# Patient Record
Sex: FEMALE
[De-identification: names, ages, dates, MRNs, and addresses within clinical notes are randomized per-mention and may not be internally consistent; named-entity substitution may affect disease eponyms.]

---
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
## 2019-02-08 ENCOUNTER — RX RENEWAL (OUTPATIENT)
Age: 19
End: 2019-02-08

## 2019-02-14 ENCOUNTER — LABORATORY RESULT (OUTPATIENT)
Age: 19
End: 2019-02-14

## 2019-02-14 ENCOUNTER — APPOINTMENT (OUTPATIENT)
Dept: INTERNAL MEDICINE | Facility: CLINIC | Age: 19
End: 2019-02-14
Payer: COMMERCIAL

## 2019-02-14 VITALS
WEIGHT: 168 LBS | BODY MASS INDEX: 28.68 KG/M2 | HEART RATE: 87 BPM | SYSTOLIC BLOOD PRESSURE: 124 MMHG | TEMPERATURE: 97 F | HEIGHT: 64 IN | OXYGEN SATURATION: 97 % | DIASTOLIC BLOOD PRESSURE: 70 MMHG

## 2019-02-14 DIAGNOSIS — Z82.49 FAMILY HISTORY OF ISCHEMIC HEART DISEASE AND OTHER DISEASES OF THE CIRCULATORY SYSTEM: ICD-10-CM

## 2019-02-14 DIAGNOSIS — M41.9 SCOLIOSIS, UNSPECIFIED: ICD-10-CM

## 2019-02-14 PROCEDURE — 99204 OFFICE O/P NEW MOD 45 MIN: CPT | Mod: 25

## 2019-02-14 PROCEDURE — 36415 COLL VENOUS BLD VENIPUNCTURE: CPT

## 2019-02-14 RX ORDER — OXYCODONE AND ACETAMINOPHEN 5; 325 MG/1; MG/1
5-325 TABLET ORAL
Qty: 28 | Refills: 0 | Status: DISCONTINUED | COMMUNITY
Start: 2017-06-27 | End: 2019-02-14

## 2019-02-14 RX ORDER — NEOMYCIN AND POLYMYXIN B SULFATES AND HYDROCORTISONE OTIC 10; 3.5; 1 MG/ML; MG/ML; [USP'U]/ML
3.5-10000-1 SUSPENSION AURICULAR (OTIC)
Qty: 10 | Refills: 5 | Status: DISCONTINUED | COMMUNITY
Start: 2017-07-07 | End: 2019-02-14

## 2019-02-14 RX ORDER — CEPHALEXIN 500 MG/1
500 CAPSULE ORAL TWICE DAILY
Qty: 14 | Refills: 0 | Status: DISCONTINUED | COMMUNITY
Start: 2017-09-26 | End: 2019-02-14

## 2019-02-14 RX ORDER — METHYLPREDNISOLONE 4 MG/1
4 TABLET ORAL
Qty: 1 | Refills: 0 | Status: DISCONTINUED | COMMUNITY
Start: 2017-09-26 | End: 2019-02-14

## 2019-02-14 RX ORDER — CIPROFLOXACIN AND DEXAMETHASONE 3; 1 MG/ML; MG/ML
0.3-0.1 SUSPENSION/ DROPS AURICULAR (OTIC)
Qty: 1 | Refills: 5 | Status: DISCONTINUED | COMMUNITY
Start: 2017-04-06 | End: 2019-02-14

## 2019-02-14 RX ORDER — NEOMYCIN SULFATE, POLYMYXIN B SULFATE AND HYDROCORTISONE 3.5; 10000; 1 MG/ML; [IU]/ML; MG/ML
3.5-10000-1 SOLUTION AURICULAR (OTIC)
Qty: 10 | Refills: 5 | Status: DISCONTINUED | COMMUNITY
Start: 2017-09-15 | End: 2019-02-14

## 2019-02-14 RX ORDER — CEPHALEXIN 500 MG/1
500 CAPSULE ORAL
Qty: 20 | Refills: 0 | Status: DISCONTINUED | COMMUNITY
Start: 2017-09-15 | End: 2019-02-14

## 2019-02-14 RX ORDER — OFLOXACIN 3 MG/ML
0.3 SOLUTION/ DROPS OPHTHALMIC
Qty: 1 | Refills: 5 | Status: DISCONTINUED | COMMUNITY
Start: 2017-06-02 | End: 2019-02-14

## 2019-02-14 RX ORDER — OFLOXACIN 3 MG/ML
0.3 SOLUTION/ DROPS OPHTHALMIC
Qty: 1 | Refills: 2 | Status: DISCONTINUED | COMMUNITY
Start: 2017-03-13 | End: 2019-02-14

## 2019-02-14 RX ORDER — OFLOXACIN 3 MG/ML
0.3 SOLUTION/ DROPS OPHTHALMIC
Qty: 1 | Refills: 5 | Status: DISCONTINUED | COMMUNITY
Start: 2017-03-15 | End: 2019-02-14

## 2019-02-14 RX ORDER — OFLOXACIN 3 MG/ML
0.3 SOLUTION/ DROPS OPHTHALMIC
Qty: 5 | Refills: 5 | Status: DISCONTINUED | COMMUNITY
Start: 2017-03-17 | End: 2019-02-14

## 2019-02-20 LAB
ALBUMIN SERPL ELPH-MCNC: 4.8 G/DL
ALP BLD-CCNC: 88 U/L
ALT SERPL-CCNC: 6 U/L
ANION GAP SERPL CALC-SCNC: 12 MMOL/L
AST SERPL-CCNC: 16 U/L
BILIRUB SERPL-MCNC: 0.7 MG/DL
BUN SERPL-MCNC: 14 MG/DL
CALCIUM SERPL-MCNC: 9.7 MG/DL
CHLORIDE SERPL-SCNC: 104 MMOL/L
CO2 SERPL-SCNC: 24 MMOL/L
CREAT SERPL-MCNC: 0.68 MG/DL
GLUCOSE SERPL-MCNC: 82 MG/DL
POTASSIUM SERPL-SCNC: 4 MMOL/L
PROT SERPL-MCNC: 7.7 G/DL
SODIUM SERPL-SCNC: 140 MMOL/L
T4 FREE SERPL-MCNC: 1.7 NG/DL
TSH SERPL-ACNC: 1.24 UIU/ML

## 2019-03-14 ENCOUNTER — APPOINTMENT (OUTPATIENT)
Dept: INTERNAL MEDICINE | Facility: CLINIC | Age: 19
End: 2019-03-14
Payer: COMMERCIAL

## 2019-03-14 VITALS
BODY MASS INDEX: 28.68 KG/M2 | HEART RATE: 80 BPM | OXYGEN SATURATION: 98 % | HEIGHT: 64 IN | WEIGHT: 168 LBS | DIASTOLIC BLOOD PRESSURE: 70 MMHG | TEMPERATURE: 97.1 F | SYSTOLIC BLOOD PRESSURE: 122 MMHG

## 2019-03-14 PROCEDURE — 99213 OFFICE O/P EST LOW 20 MIN: CPT

## 2019-05-28 ENCOUNTER — APPOINTMENT (OUTPATIENT)
Dept: INTERNAL MEDICINE | Facility: CLINIC | Age: 19
End: 2019-05-28
Payer: COMMERCIAL

## 2019-05-28 ENCOUNTER — APPOINTMENT (OUTPATIENT)
Dept: OPHTHALMOLOGY | Facility: CLINIC | Age: 19
End: 2019-05-28

## 2019-05-28 VITALS
HEIGHT: 64 IN | TEMPERATURE: 97.5 F | BODY MASS INDEX: 29.02 KG/M2 | WEIGHT: 170 LBS | DIASTOLIC BLOOD PRESSURE: 70 MMHG | SYSTOLIC BLOOD PRESSURE: 110 MMHG

## 2019-05-28 PROCEDURE — 86580 TB INTRADERMAL TEST: CPT

## 2019-05-28 PROCEDURE — 99213 OFFICE O/P EST LOW 20 MIN: CPT | Mod: 25

## 2019-05-28 PROCEDURE — 90471 IMMUNIZATION ADMIN: CPT

## 2019-05-28 PROCEDURE — 90715 TDAP VACCINE 7 YRS/> IM: CPT

## 2019-05-31 ENCOUNTER — APPOINTMENT (OUTPATIENT)
Dept: OTOLARYNGOLOGY | Facility: CLINIC | Age: 19
End: 2019-05-31
Payer: COMMERCIAL

## 2019-05-31 VITALS
DIASTOLIC BLOOD PRESSURE: 78 MMHG | BODY MASS INDEX: 29.02 KG/M2 | HEIGHT: 64 IN | WEIGHT: 170 LBS | SYSTOLIC BLOOD PRESSURE: 112 MMHG | HEART RATE: 81 BPM

## 2019-05-31 PROCEDURE — 31231 NASAL ENDOSCOPY DX: CPT

## 2019-05-31 PROCEDURE — 69210 REMOVE IMPACTED EAR WAX UNI: CPT

## 2019-05-31 PROCEDURE — 99214 OFFICE O/P EST MOD 30 MIN: CPT | Mod: 25

## 2019-05-31 NOTE — HISTORY OF PRESENT ILLNESS
[de-identified] : Adrien has had chills and she thinks fevers for the last week or so along with moderate bilateral ear clogging. She has moderate pain and pressure in both ears as well. She has been using old antibtioics drops that she had with no benefit. She does not have any nasal congestion or runny nose. SHe does have a sore throat that is moderate to severe.

## 2019-05-31 NOTE — ASSESSMENT
[FreeTextEntry1] : A. showed some discomfort in her uterus massive cerumen impaction some mild debris in the left ear consistent with otitis media nasal endoscopy showed deviated septum some secretions was some suctioned out to put her on Zithromax as well as Floxin otic drops fully expecting her symptoms are resolved.

## 2019-07-23 ENCOUNTER — APPOINTMENT (OUTPATIENT)
Dept: OTOLARYNGOLOGY | Facility: CLINIC | Age: 19
End: 2019-07-23
Payer: COMMERCIAL

## 2019-07-23 VITALS
SYSTOLIC BLOOD PRESSURE: 127 MMHG | WEIGHT: 150 LBS | DIASTOLIC BLOOD PRESSURE: 76 MMHG | BODY MASS INDEX: 25.61 KG/M2 | HEIGHT: 64 IN | HEART RATE: 87 BPM

## 2019-07-23 PROCEDURE — 99214 OFFICE O/P EST MOD 30 MIN: CPT

## 2019-07-23 NOTE — HISTORY OF PRESENT ILLNESS
[de-identified] : PAtient is still feeling like the right ear is clogged and she has pain that comes and goes in the right ear. SHe had the surgery in the left ear but is still having issues with the right ear only today. Specifically last niht and today she has had moderate pain in the right ear that is sharp and stabbing. SHe has been using the floxin drops which were last prescribed. SHe deneis sudden changes in hearing, dizziness or ringingin the ears.

## 2019-07-23 NOTE — ASSESSMENT
[FreeTextEntry1] : Patient been complaining of severe discomfort on examination massive cerumen impaction was curetted out showing very boggy external auditory canal consistent otitis externa. Upon on Ciprodex drops give her a coupon to help with the plus and she'll followup at as needed.

## 2019-07-25 ENCOUNTER — LABORATORY RESULT (OUTPATIENT)
Age: 19
End: 2019-07-25

## 2019-07-25 ENCOUNTER — MEDICATION RENEWAL (OUTPATIENT)
Age: 19
End: 2019-07-25

## 2019-07-25 ENCOUNTER — APPOINTMENT (OUTPATIENT)
Dept: INTERNAL MEDICINE | Facility: CLINIC | Age: 19
End: 2019-07-25
Payer: COMMERCIAL

## 2019-07-25 VITALS
BODY MASS INDEX: 31.41 KG/M2 | DIASTOLIC BLOOD PRESSURE: 60 MMHG | HEIGHT: 64 IN | TEMPERATURE: 98.2 F | HEART RATE: 82 BPM | SYSTOLIC BLOOD PRESSURE: 124 MMHG | WEIGHT: 184 LBS | OXYGEN SATURATION: 97 %

## 2019-07-25 PROCEDURE — 36415 COLL VENOUS BLD VENIPUNCTURE: CPT

## 2019-07-25 PROCEDURE — 99214 OFFICE O/P EST MOD 30 MIN: CPT | Mod: 25

## 2019-07-29 LAB
ALBUMIN SERPL ELPH-MCNC: 4.7 G/DL
ALP BLD-CCNC: 101 U/L
ALT SERPL-CCNC: 58 U/L
ANION GAP SERPL CALC-SCNC: 12 MMOL/L
AST SERPL-CCNC: 36 U/L
BILIRUB SERPL-MCNC: 0.3 MG/DL
BUN SERPL-MCNC: 15 MG/DL
CALCIUM SERPL-MCNC: 10.1 MG/DL
CHLORIDE SERPL-SCNC: 102 MMOL/L
CO2 SERPL-SCNC: 24 MMOL/L
CREAT SERPL-MCNC: 0.51 MG/DL
GLUCOSE SERPL-MCNC: 112 MG/DL
POTASSIUM SERPL-SCNC: 4.3 MMOL/L
PROT SERPL-MCNC: 7.4 G/DL
SODIUM SERPL-SCNC: 138 MMOL/L
T3 SERPL-MCNC: 137 NG/DL
T3RU NFR SERPL: 1.1 TBI
T4 FREE SERPL-MCNC: 1.4 NG/DL
T4 SERPL-MCNC: 8.8 UG/DL
TSH SERPL-ACNC: 1.1 UIU/ML

## 2019-09-23 ENCOUNTER — APPOINTMENT (OUTPATIENT)
Dept: INTERNAL MEDICINE | Facility: CLINIC | Age: 19
End: 2019-09-23
Payer: COMMERCIAL

## 2019-09-23 VITALS
HEART RATE: 102 BPM | WEIGHT: 185 LBS | BODY MASS INDEX: 31.58 KG/M2 | SYSTOLIC BLOOD PRESSURE: 122 MMHG | OXYGEN SATURATION: 98 % | DIASTOLIC BLOOD PRESSURE: 60 MMHG | TEMPERATURE: 98.2 F | HEIGHT: 64 IN

## 2019-09-23 PROCEDURE — 99213 OFFICE O/P EST LOW 20 MIN: CPT

## 2019-10-04 ENCOUNTER — APPOINTMENT (OUTPATIENT)
Dept: OTOLARYNGOLOGY | Facility: CLINIC | Age: 19
End: 2019-10-04
Payer: COMMERCIAL

## 2019-10-04 VITALS
BODY MASS INDEX: 31.58 KG/M2 | WEIGHT: 185 LBS | HEART RATE: 80 BPM | HEIGHT: 64 IN | SYSTOLIC BLOOD PRESSURE: 118 MMHG | DIASTOLIC BLOOD PRESSURE: 79 MMHG

## 2019-10-04 PROCEDURE — 99214 OFFICE O/P EST MOD 30 MIN: CPT | Mod: 25

## 2019-10-04 PROCEDURE — 69210 REMOVE IMPACTED EAR WAX UNI: CPT

## 2019-10-04 NOTE — REASON FOR VISIT
[Subsequent Evaluation] : a subsequent evaluation for [Ear Drainage] : ear drainage [Ear Pain] : ear pain [FreeTextEntry2] : hearing issues

## 2019-10-04 NOTE — PHYSICAL EXAM
[de-identified] : wax bilat and tender [de-identified] : TM's not fully visualized bilat [Midline] : trachea located in midline position [Normal] : no rashes

## 2019-10-04 NOTE — ASSESSMENT
[FreeTextEntry1] : Bilat wax impaction\par Rec otic drops placed and prescribed\par f/u 10-14 days \par h2o precautions\par I suggested a 2nd opinion regarding long term care of ears

## 2019-10-04 NOTE — REVIEW OF SYSTEMS
[Ear Pain] : ear pain [Hearing Loss] : hearing loss [Nasal Congestion] : nasal congestion [Negative] : Heme/Lymph

## 2019-10-04 NOTE — HISTORY OF PRESENT ILLNESS
[No] : patient does not have a  history of radiation therapy [T plasty] : tympanoplasty [de-identified] : 19 yo\par Patient is having minor pressure in the ears and has muffled hearing that comes and goes. SHe has some issues with her left ear as she had surgery over the last year and  in the right ear . She has chr intermittent ear pain and drainage bilat from ears. SHe does not have any nasal congestion or runny nose right now. She tried putting ear drops in the ears but it didn’t help with the clogged sensation. 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 \par h/o cleft lip and palate repair [Tinnitus] : no tinnitus [Hearing Loss] : hearing loss [Otalgia] : otalgia [Otorrhea] : no otorrhea [Meniere Disease] : no Meniere disease [Vertigo] : no vertigo [Otosclerosis] : no otosclerosis [Eustachian Tube Dysfunction] : eustachian tube dysfunction [Perilymphatic Fistula] : no perilymphatic fistula [Hypertension] : no hypertension [Loud Noise Exposure] : no history of loud noise exposure [Smoking] : no smoking [Facial Pain] : no facial pain [Facial Pressure] : no facial pressure [Nasal Congestion] : no nasal congestion [Ear Pain] : ear pain [Allergic Rhinitis] : no allergic rhinitis [Ear Fullness] : ear fullness [Seasonal Allergies] : no seasonal allergies [Environmental Allergies] : no environmental allergies [Environmental Allergens] : no environmental allergens [Allergies] : no allergies [Asthma] : no asthma [None] : The patient is currently asymptomatic. [Neck Mass] : no neck mass [Neck Pain] : no neck pain [Cold Intolerance] : no cold intolerance [Chills] : no chills [Cough] : no cough [Fatigue] : no fatigue [Heat Intolerance] : no heat intolerance [Sialadenitis] : no sialadenitis [Hyperthyroidism] : no hyperthyroidism [Hodgkin Disease] : no hodgkin disease [Non-Hodgkin Lymphoma] : no non-hodgkin lymphoma [Tobacco Use] : no tobacco use [Alcohol Use] : no alcohol use [Graves Disease] : no graves disease [Thyroid Cancer] : no thyroid cancer

## 2019-10-25 ENCOUNTER — APPOINTMENT (OUTPATIENT)
Dept: OTOLARYNGOLOGY | Facility: CLINIC | Age: 19
End: 2019-10-25

## 2019-11-04 ENCOUNTER — APPOINTMENT (OUTPATIENT)
Dept: OTOLARYNGOLOGY | Facility: CLINIC | Age: 19
End: 2019-11-04
Payer: COMMERCIAL

## 2019-11-04 VITALS
DIASTOLIC BLOOD PRESSURE: 80 MMHG | HEART RATE: 88 BPM | HEIGHT: 64 IN | SYSTOLIC BLOOD PRESSURE: 125 MMHG | WEIGHT: 185 LBS | BODY MASS INDEX: 31.58 KG/M2

## 2019-11-04 PROCEDURE — 69210 REMOVE IMPACTED EAR WAX UNI: CPT

## 2019-11-04 PROCEDURE — 99214 OFFICE O/P EST MOD 30 MIN: CPT | Mod: 25

## 2019-11-04 NOTE — HISTORY OF PRESENT ILLNESS
[T plasty] : tympanoplasty [No] : patient does not have a  history of radiation therapy [de-identified] : Emergency visit\par 17 yo\par Patient is having minor pressure in the ears and has muffled hearing that comes and goes. SHe has some issues with her left ear as she had surgery over the last year and  in the right ear . She has chr intermittent ear pain and drainage bilat from ears. SHe does not have any nasal congestion or runny nose right now. She tried putting ear drops in the ears but it didn’t help with the clogged sensation. 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 \par h/o cleft lip and palate repair [Tinnitus] : no tinnitus [Hearing Loss] : hearing loss [Otalgia] : otalgia [Otorrhea] : no otorrhea [Vertigo] : no vertigo [Meniere Disease] : no Meniere disease [Eustachian Tube Dysfunction] : eustachian tube dysfunction [Otosclerosis] : no otosclerosis [Perilymphatic Fistula] : no perilymphatic fistula [Hypertension] : no hypertension [Loud Noise Exposure] : no history of loud noise exposure [Smoking] : no smoking [Facial Pain] : no facial pain [Facial Pressure] : no facial pressure [Nasal Congestion] : no nasal congestion [Ear Pain] : ear pain [Ear Fullness] : ear fullness [Allergic Rhinitis] : no allergic rhinitis [Environmental Allergies] : no environmental allergies [Seasonal Allergies] : no seasonal allergies [Environmental Allergens] : no environmental allergens [Allergies] : no allergies [Asthma] : no asthma [None] : The patient is currently asymptomatic. [Neck Mass] : no neck mass [Neck Pain] : no neck pain [Chills] : no chills [Cold Intolerance] : no cold intolerance [Cough] : no cough [Fatigue] : no fatigue [Heat Intolerance] : no heat intolerance [Hyperthyroidism] : no hyperthyroidism [Sialadenitis] : no sialadenitis [Hodgkin Disease] : no hodgkin disease [Non-Hodgkin Lymphoma] : no non-hodgkin lymphoma [Tobacco Use] : no tobacco use [Alcohol Use] : no alcohol use [Graves Disease] : no graves disease [Thyroid Cancer] : no thyroid cancer

## 2019-11-04 NOTE — PHYSICAL EXAM
[de-identified] : wax bilat and tender [de-identified] : TM's not fully visualized bilatRt TM seems intact  Lt-prob perf [Midline] : trachea located in midline position [Normal] : no rashes

## 2019-11-04 NOTE — ASSESSMENT
[FreeTextEntry1] : Bilat wax impaction\par Chr mild OE and poss Bilat TM perf\par Rec otic drops placed and prescribed\par Pt tasted the drops when placed opn the left but dod not when placed on the right\par f/u 10-14 days \par h2o precautions\par I suggested a 2nd opinion regarding long term care of ears\par poss rev ear surgery

## 2019-11-04 NOTE — REASON FOR VISIT
[Subsequent Evaluation] : a subsequent evaluation for [Ear Drainage] : ear drainage [Ear Pain] : ear pain [FreeTextEntry2] : hearing issues  [Parent] : parent

## 2019-11-14 ENCOUNTER — APPOINTMENT (OUTPATIENT)
Dept: OTOLARYNGOLOGY | Facility: CLINIC | Age: 19
End: 2019-11-14

## 2019-12-06 ENCOUNTER — APPOINTMENT (OUTPATIENT)
Dept: OTOLARYNGOLOGY | Facility: CLINIC | Age: 19
End: 2019-12-06

## 2019-12-09 ENCOUNTER — APPOINTMENT (OUTPATIENT)
Dept: OTOLARYNGOLOGY | Facility: CLINIC | Age: 19
End: 2019-12-09

## 2019-12-30 ENCOUNTER — APPOINTMENT (OUTPATIENT)
Dept: INTERNAL MEDICINE | Facility: CLINIC | Age: 19
End: 2019-12-30
Payer: COMMERCIAL

## 2019-12-30 VITALS — SYSTOLIC BLOOD PRESSURE: 122 MMHG | DIASTOLIC BLOOD PRESSURE: 80 MMHG | WEIGHT: 186 LBS | TEMPERATURE: 99.3 F

## 2019-12-30 DIAGNOSIS — Z87.2 PERSONAL HISTORY OF DISEASES OF THE SKIN AND SUBCUTANEOUS TISSUE: ICD-10-CM

## 2019-12-30 PROCEDURE — 99213 OFFICE O/P EST LOW 20 MIN: CPT

## 2020-01-02 ENCOUNTER — APPOINTMENT (OUTPATIENT)
Dept: SURGERY | Facility: CLINIC | Age: 20
End: 2020-01-02

## 2020-01-02 ENCOUNTER — EMERGENCY (EMERGENCY)
Facility: HOSPITAL | Age: 20
LOS: 1 days | Discharge: LEFT BEFORE DISCHARGE | End: 2020-01-02
Admitting: EMERGENCY MEDICINE

## 2020-01-02 DIAGNOSIS — M26.02 MAXILLARY HYPOPLASIA: Chronic | ICD-10-CM

## 2020-01-02 RX ORDER — OFLOXACIN OTIC 3 MG/ML
0.3 SOLUTION AURICULAR (OTIC) TWICE DAILY
Qty: 1 | Refills: 5 | Status: DISCONTINUED | COMMUNITY
Start: 2019-05-31 | End: 2020-01-02

## 2020-01-02 RX ORDER — AZITHROMYCIN 250 MG/1
250 TABLET, FILM COATED ORAL
Qty: 1 | Refills: 0 | Status: DISCONTINUED | COMMUNITY
Start: 2019-05-31 | End: 2020-01-02

## 2020-01-02 RX ORDER — NEOMYCIN AND POLYMYXIN B SULFATES AND HYDROCORTISONE OTIC 10; 3.5; 1 MG/ML; MG/ML; [USP'U]/ML
3.5-10000-1 SUSPENSION AURICULAR (OTIC)
Qty: 1 | Refills: 0 | Status: DISCONTINUED | COMMUNITY
Start: 2018-10-04 | End: 2020-01-02

## 2020-01-02 RX ORDER — OFLOXACIN OTIC 3 MG/ML
0.3 SOLUTION AURICULAR (OTIC) TWICE DAILY
Qty: 1 | Refills: 2 | Status: DISCONTINUED | COMMUNITY
Start: 2018-06-28 | End: 2020-01-02

## 2020-01-02 RX ORDER — CIPROFLOXACIN AND DEXAMETHASONE 3; 1 MG/ML; MG/ML
0.3-0.1 SUSPENSION/ DROPS AURICULAR (OTIC)
Qty: 1 | Refills: 3 | Status: DISCONTINUED | COMMUNITY
Start: 2019-07-23 | End: 2020-01-02

## 2020-01-13 ENCOUNTER — APPOINTMENT (OUTPATIENT)
Dept: INTERNAL MEDICINE | Facility: CLINIC | Age: 20
End: 2020-01-13
Payer: COMMERCIAL

## 2020-01-13 VITALS
BODY MASS INDEX: 31.76 KG/M2 | TEMPERATURE: 97 F | WEIGHT: 186 LBS | SYSTOLIC BLOOD PRESSURE: 128 MMHG | OXYGEN SATURATION: 99 % | HEIGHT: 64 IN | HEART RATE: 86 BPM | DIASTOLIC BLOOD PRESSURE: 80 MMHG

## 2020-01-13 DIAGNOSIS — Z11.4 ENCOUNTER FOR SCREENING FOR HUMAN IMMUNODEFICIENCY VIRUS [HIV]: ICD-10-CM

## 2020-01-13 PROCEDURE — 99395 PREV VISIT EST AGE 18-39: CPT | Mod: 25

## 2020-01-13 PROCEDURE — 93000 ELECTROCARDIOGRAM COMPLETE: CPT

## 2020-01-13 PROCEDURE — 36415 COLL VENOUS BLD VENIPUNCTURE: CPT

## 2020-01-13 RX ORDER — CEPHALEXIN 500 MG/1
500 CAPSULE ORAL
Qty: 30 | Refills: 0 | Status: DISCONTINUED | COMMUNITY
Start: 2019-12-30 | End: 2020-01-13

## 2020-01-13 RX ORDER — ESCITALOPRAM OXALATE 20 MG/1
20 TABLET ORAL
Qty: 90 | Refills: 0 | Status: DISCONTINUED | COMMUNITY
Start: 2019-02-14 | End: 2020-01-13

## 2020-01-15 LAB
ALBUMIN SERPL ELPH-MCNC: 4.6 G/DL
ALP BLD-CCNC: 97 U/L
ALT SERPL-CCNC: 40 U/L
ANION GAP SERPL CALC-SCNC: 14 MMOL/L
APPEARANCE: ABNORMAL
AST SERPL-CCNC: 24 U/L
BACTERIA: ABNORMAL
BASOPHILS # BLD AUTO: 0.03 K/UL
BASOPHILS NFR BLD AUTO: 0.5 %
BILIRUB SERPL-MCNC: 0.2 MG/DL
BILIRUBIN URINE: NEGATIVE
BLOOD URINE: NEGATIVE
BUN SERPL-MCNC: 15 MG/DL
CALCIUM SERPL-MCNC: 10 MG/DL
CHLORIDE SERPL-SCNC: 105 MMOL/L
CHOLEST SERPL-MCNC: 140 MG/DL
CHOLEST/HDLC SERPL: 3.3 RATIO
CO2 SERPL-SCNC: 22 MMOL/L
COLOR: YELLOW
CREAT SERPL-MCNC: 0.56 MG/DL
EOSINOPHIL # BLD AUTO: 0.17 K/UL
EOSINOPHIL NFR BLD AUTO: 2.7 %
GLUCOSE QUALITATIVE U: NEGATIVE
GLUCOSE SERPL-MCNC: 90 MG/DL
HCT VFR BLD CALC: 42 %
HDLC SERPL-MCNC: 43 MG/DL
HGB BLD-MCNC: 13.1 G/DL
HIV1+2 AB SPEC QL IA.RAPID: NONREACTIVE
HYALINE CASTS: 0 /LPF
IMM GRANULOCYTES NFR BLD AUTO: 0.5 %
KETONES URINE: NEGATIVE
LDLC SERPL CALC-MCNC: 82 MG/DL
LEUKOCYTE ESTERASE URINE: ABNORMAL
LYMPHOCYTES # BLD AUTO: 1.6 K/UL
LYMPHOCYTES NFR BLD AUTO: 25.8 %
MAN DIFF?: NORMAL
MCHC RBC-ENTMCNC: 27.4 PG
MCHC RBC-ENTMCNC: 31.2 GM/DL
MCV RBC AUTO: 87.9 FL
MICROSCOPIC-UA: NORMAL
MONOCYTES # BLD AUTO: 0.38 K/UL
MONOCYTES NFR BLD AUTO: 6.1 %
NEUTROPHILS # BLD AUTO: 4 K/UL
NEUTROPHILS NFR BLD AUTO: 64.4 %
NITRITE URINE: NEGATIVE
PH URINE: 5.5
PLATELET # BLD AUTO: 288 K/UL
POTASSIUM SERPL-SCNC: 4.5 MMOL/L
PROT SERPL-MCNC: 7.3 G/DL
PROTEIN URINE: NEGATIVE
RBC # BLD: 4.78 M/UL
RBC # FLD: 12.8 %
RED BLOOD CELLS URINE: 4 /HPF
SODIUM SERPL-SCNC: 141 MMOL/L
SPECIFIC GRAVITY URINE: 1.03
SQUAMOUS EPITHELIAL CELLS: 10 /HPF
T4 FREE SERPL-MCNC: 1.6 NG/DL
TRIGL SERPL-MCNC: 74 MG/DL
TSH SERPL-ACNC: 2.62 UIU/ML
UROBILINOGEN URINE: NORMAL
WBC # FLD AUTO: 6.21 K/UL
WHITE BLOOD CELLS URINE: 9 /HPF

## 2020-02-07 ENCOUNTER — APPOINTMENT (OUTPATIENT)
Dept: OTOLARYNGOLOGY | Facility: CLINIC | Age: 20
End: 2020-02-07
Payer: COMMERCIAL

## 2020-02-07 VITALS
BODY MASS INDEX: 31.76 KG/M2 | WEIGHT: 186 LBS | HEART RATE: 75 BPM | DIASTOLIC BLOOD PRESSURE: 76 MMHG | SYSTOLIC BLOOD PRESSURE: 124 MMHG | HEIGHT: 64 IN

## 2020-02-07 PROCEDURE — 99214 OFFICE O/P EST MOD 30 MIN: CPT | Mod: 25

## 2020-02-07 PROCEDURE — 69210 REMOVE IMPACTED EAR WAX UNI: CPT

## 2020-02-07 NOTE — HISTORY OF PRESENT ILLNESS
[de-identified] : 19F s/p cleft lip/palate repair in early childhood, had tubes as a young child around age 1, left eardrum repair age 6, prior repair Dr. Hummel 2017.  Now with left hearing loss and associated pain.  Hearing loss is constant, mild severity, present for years.  Sometimes gets drainage in both ears.

## 2020-02-07 NOTE — REASON FOR VISIT
[Initial Evaluation] : an initial evaluation for [FreeTextEntry2] : patient looking for a second opinion in her left ear, patient states born with a hole in her left ear and had surgery in 2017 in left ear to repair hole, patient states been having recurrent ear in fection after the surgery and last time she took antibiotics for left ear infection was 3 weeks ago

## 2020-02-07 NOTE — REVIEW OF SYSTEMS
[Ear Pain] : ear pain [Recurrent Ear Infections] : recurrent ear infections [Ear Drainage] : ear drainage [Ear Noises] : ear noises [Negative] : Psychiatric

## 2020-02-07 NOTE — PHYSICAL EXAM
[de-identified] : L myringitis with TM thickening, no visible perforation; R TM intact without effusion/retraction [de-identified] : ceruminous debris left ear canal [FreeTextEntry2] : Facial nerve function is House Brackmann 1/6 in right ear and 1/6 in left ear.  s/p prior cleft lip/palate repair [Normal] : no rashes

## 2020-02-07 NOTE — DATA REVIEWED
[de-identified] : I personally reviewed the patient's audiogram from 2018, which shows L CHL, normal hearing right ear.\par

## 2020-02-21 ENCOUNTER — APPOINTMENT (OUTPATIENT)
Dept: OTOLARYNGOLOGY | Facility: CLINIC | Age: 20
End: 2020-02-21
Payer: COMMERCIAL

## 2020-02-21 PROCEDURE — 92567 TYMPANOMETRY: CPT

## 2020-02-21 PROCEDURE — G0268 REMOVAL OF IMPACTED WAX MD: CPT

## 2020-02-21 PROCEDURE — 99213 OFFICE O/P EST LOW 20 MIN: CPT | Mod: 25

## 2020-02-21 PROCEDURE — 92557 COMPREHENSIVE HEARING TEST: CPT

## 2020-02-21 RX ORDER — FLUTICASONE PROPIONATE 50 UG/1
50 SPRAY, METERED NASAL DAILY
Qty: 1 | Refills: 10 | Status: DISCONTINUED | COMMUNITY
Start: 2017-06-02 | End: 2020-02-21

## 2020-02-21 RX ORDER — OFLOXACIN OTIC 3 MG/ML
0.3 SOLUTION AURICULAR (OTIC) TWICE DAILY
Qty: 1 | Refills: 3 | Status: DISCONTINUED | COMMUNITY
Start: 2019-10-04 | End: 2020-02-21

## 2020-02-21 NOTE — REASON FOR VISIT
[Subsequent Evaluation] : a subsequent evaluation for [Parent] : parent [FreeTextEntry2] : left hearing loss

## 2020-02-21 NOTE — PHYSICAL EXAM
[de-identified] : ceruminous debris left ear canal [de-identified] : L myringitis improved since last visit, minor residual powder removed; R TM intact without effusion/retraction [FreeTextEntry2] : Facial nerve function is House Brackmann 1/6 in right ear and 1/6 in left ear.  s/p prior cleft lip/palate repair [Normal] : orientation to person, place, and time: normal

## 2020-02-21 NOTE — DATA REVIEWED
[de-identified] : I personally reviewed the patient's audiogram, which shows mild LF L CHL rising to normal, improved in high frequencies compared to last audio from Dr. Hummel's office.  \par

## 2020-02-21 NOTE — HISTORY OF PRESENT ILLNESS
[de-identified] : 19 year old female s/p cleft lip/palate repair and left eardrum repair. , follow up for left hearing loss. Reports hearing loss is the same since the last visit. Reports occasional left tinnitus.  Patient denies otalgia, otorrhea, dizziness, vertigo, headaches related to hearing.  Left ear pain/itching improved.\par \par

## 2020-03-20 ENCOUNTER — APPOINTMENT (OUTPATIENT)
Dept: OTOLARYNGOLOGY | Facility: CLINIC | Age: 20
End: 2020-03-20
Payer: COMMERCIAL

## 2020-03-20 PROCEDURE — 99441: CPT

## 2020-04-24 ENCOUNTER — APPOINTMENT (OUTPATIENT)
Dept: OTOLARYNGOLOGY | Facility: CLINIC | Age: 20
End: 2020-04-24
Payer: COMMERCIAL

## 2020-04-24 VITALS
HEART RATE: 90 BPM | SYSTOLIC BLOOD PRESSURE: 134 MMHG | BODY MASS INDEX: 29.88 KG/M2 | HEIGHT: 64 IN | WEIGHT: 175 LBS | DIASTOLIC BLOOD PRESSURE: 82 MMHG

## 2020-04-24 VITALS — TEMPERATURE: 98.7 F

## 2020-04-24 PROCEDURE — 69210 REMOVE IMPACTED EAR WAX UNI: CPT

## 2020-04-24 PROCEDURE — 99213 OFFICE O/P EST LOW 20 MIN: CPT | Mod: 25

## 2020-04-24 NOTE — PROCEDURE
[FreeTextEntry3] : Procedure note:  Bilateral cerumenectomy\par \par Apr 24, 2020 \par \par Description of Procedure:   Bilateral cerumen impactions were noted requiring debridement under the operating microscope using otologic instrumentation.  The patient tolerated the procedure without complications.\par \par

## 2020-04-24 NOTE — REASON FOR VISIT
[Subsequent Evaluation] : a subsequent evaluation for [FreeTextEntry2] : c/o of clogged in right ear R>L but started with left ear a month ago with pain and itchiness c/o of right ear drainage, ear ringing started from a long time

## 2020-04-24 NOTE — PHYSICAL EXAM
[de-identified] : no evidence of myringitis in either ear, both TM appear intact [de-identified] : minor ceruminous debris b/l lateral ear canal [Normal] : no rashes

## 2020-05-22 ENCOUNTER — APPOINTMENT (OUTPATIENT)
Dept: OTOLARYNGOLOGY | Facility: CLINIC | Age: 20
End: 2020-05-22

## 2020-06-10 ENCOUNTER — APPOINTMENT (OUTPATIENT)
Dept: INTERNAL MEDICINE | Facility: CLINIC | Age: 20
End: 2020-06-10
Payer: COMMERCIAL

## 2020-06-10 PROCEDURE — 36415 COLL VENOUS BLD VENIPUNCTURE: CPT

## 2020-06-11 LAB
SARS-COV-2 IGG SERPL IA-ACNC: <0.1 INDEX
SARS-COV-2 IGG SERPL QL IA: NEGATIVE

## 2020-06-29 ENCOUNTER — APPOINTMENT (OUTPATIENT)
Dept: INTERNAL MEDICINE | Facility: CLINIC | Age: 20
End: 2020-06-29
Payer: COMMERCIAL

## 2020-06-29 VITALS
HEIGHT: 65 IN | BODY MASS INDEX: 29.82 KG/M2 | SYSTOLIC BLOOD PRESSURE: 122 MMHG | DIASTOLIC BLOOD PRESSURE: 80 MMHG | WEIGHT: 179 LBS | TEMPERATURE: 98 F | HEART RATE: 100 BPM

## 2020-06-29 VITALS — TEMPERATURE: 98.4 F

## 2020-06-29 DIAGNOSIS — B35.3 TINEA PEDIS: ICD-10-CM

## 2020-06-29 PROCEDURE — 99214 OFFICE O/P EST MOD 30 MIN: CPT

## 2020-06-29 RX ORDER — DOXYCYCLINE 100 MG/1
100 CAPSULE ORAL
Qty: 30 | Refills: 0 | Status: DISCONTINUED | COMMUNITY
Start: 2020-01-02 | End: 2020-06-29

## 2020-07-16 ENCOUNTER — NON-APPOINTMENT (OUTPATIENT)
Age: 20
End: 2020-07-16

## 2020-07-16 ENCOUNTER — APPOINTMENT (OUTPATIENT)
Dept: OPHTHALMOLOGY | Facility: CLINIC | Age: 20
End: 2020-07-16
Payer: COMMERCIAL

## 2020-07-16 PROCEDURE — 92014 COMPRE OPH EXAM EST PT 1/>: CPT

## 2020-08-20 ENCOUNTER — APPOINTMENT (OUTPATIENT)
Dept: INTERNAL MEDICINE | Facility: CLINIC | Age: 20
End: 2020-08-20
Payer: COMMERCIAL

## 2020-08-20 VITALS
WEIGHT: 174 LBS | OXYGEN SATURATION: 98 % | HEIGHT: 65 IN | DIASTOLIC BLOOD PRESSURE: 80 MMHG | BODY MASS INDEX: 28.99 KG/M2 | SYSTOLIC BLOOD PRESSURE: 122 MMHG | HEART RATE: 83 BPM

## 2020-08-20 VITALS — TEMPERATURE: 98.6 F

## 2020-08-20 PROCEDURE — 99214 OFFICE O/P EST MOD 30 MIN: CPT | Mod: 25

## 2020-08-20 PROCEDURE — 86580 TB INTRADERMAL TEST: CPT

## 2020-09-04 ENCOUNTER — APPOINTMENT (OUTPATIENT)
Dept: OTOLARYNGOLOGY | Facility: CLINIC | Age: 20
End: 2020-09-04
Payer: COMMERCIAL

## 2020-09-04 VITALS
DIASTOLIC BLOOD PRESSURE: 87 MMHG | SYSTOLIC BLOOD PRESSURE: 119 MMHG | HEART RATE: 87 BPM | WEIGHT: 170 LBS | BODY MASS INDEX: 28.32 KG/M2 | HEIGHT: 65 IN

## 2020-09-04 PROCEDURE — 99213 OFFICE O/P EST LOW 20 MIN: CPT | Mod: 25

## 2020-09-04 PROCEDURE — 69210 REMOVE IMPACTED EAR WAX UNI: CPT

## 2020-09-04 RX ORDER — SERTRALINE 25 MG/1
25 TABLET, FILM COATED ORAL
Qty: 90 | Refills: 3 | Status: COMPLETED | COMMUNITY
Start: 2020-01-13 | End: 2020-09-04

## 2020-09-04 RX ORDER — CLOTRIMAZOLE 10 MG/G
1 CREAM TOPICAL 3 TIMES DAILY
Qty: 1 | Refills: 0 | Status: COMPLETED | COMMUNITY
Start: 2020-06-29 | End: 2020-09-04

## 2020-09-04 NOTE — HISTORY OF PRESENT ILLNESS
[de-identified] : 19 year female follow up bilateral clogged ears. Reports feeling better after Wilfred powder applied to both ear canals 04/24/2020. States starting to feel both ears clogged for 1 week. Reports dizziness 2 days ago, and intermittent left tinnitus. Denies otalgia, otorrhea, headaches related to hearing.

## 2020-09-04 NOTE — PHYSICAL EXAM
[de-identified] : minor ceruminous debris b/l lateral ear canal [de-identified] : no evidence of myringitis in either ear, both TM appear intact [Normal] : no rashes

## 2020-09-04 NOTE — PROCEDURE
[FreeTextEntry3] : Procedure note:  Bilateral cerumenectomy\par \par Sep 04, 2020 \par \par Description of Procedure:   Bilateral cerumen impactions were noted requiring debridement under the operating microscope using otologic instrumentation.  The patient tolerated the procedure without complications.\par \par

## 2020-10-13 ENCOUNTER — APPOINTMENT (OUTPATIENT)
Dept: INTERNAL MEDICINE | Facility: CLINIC | Age: 20
End: 2020-10-13
Payer: COMMERCIAL

## 2020-10-13 VITALS
DIASTOLIC BLOOD PRESSURE: 70 MMHG | SYSTOLIC BLOOD PRESSURE: 120 MMHG | OXYGEN SATURATION: 99 % | BODY MASS INDEX: 29.32 KG/M2 | HEART RATE: 98 BPM | HEIGHT: 65 IN | WEIGHT: 176 LBS | TEMPERATURE: 97 F

## 2020-10-13 PROCEDURE — 99214 OFFICE O/P EST MOD 30 MIN: CPT | Mod: 25

## 2020-10-13 PROCEDURE — 90686 IIV4 VACC NO PRSV 0.5 ML IM: CPT

## 2020-10-13 PROCEDURE — G0008: CPT

## 2020-10-13 PROCEDURE — 36415 COLL VENOUS BLD VENIPUNCTURE: CPT

## 2020-10-19 LAB
ANA SER IF-ACNC: NEGATIVE
FERRITIN SERPL-MCNC: 32 NG/ML
FOLATE SERPL-MCNC: 9.3 NG/ML
T4 FREE SERPL-MCNC: 1.4 NG/DL
TSH SERPL-ACNC: 1.28 UIU/ML
VIT B12 SERPL-MCNC: 465 PG/ML

## 2020-12-10 ENCOUNTER — APPOINTMENT (OUTPATIENT)
Dept: OTOLARYNGOLOGY | Facility: CLINIC | Age: 20
End: 2020-12-10
Payer: COMMERCIAL

## 2020-12-10 VITALS
HEART RATE: 75 BPM | BODY MASS INDEX: 29.32 KG/M2 | DIASTOLIC BLOOD PRESSURE: 70 MMHG | HEIGHT: 65 IN | SYSTOLIC BLOOD PRESSURE: 120 MMHG | WEIGHT: 176 LBS

## 2020-12-10 PROCEDURE — 99072 ADDL SUPL MATRL&STAF TM PHE: CPT

## 2020-12-10 PROCEDURE — 99213 OFFICE O/P EST LOW 20 MIN: CPT | Mod: 25

## 2020-12-10 PROCEDURE — 69210 REMOVE IMPACTED EAR WAX UNI: CPT

## 2020-12-10 NOTE — PHYSICAL EXAM
[de-identified] : minor ceruminous debris b/l lateral ear canal [de-identified] : no evidence of myringitis in either ear, both TM appear intact [Normal] : no rashes

## 2020-12-10 NOTE — REASON FOR VISIT
[Subsequent Evaluation] : a subsequent evaluation for [FreeTextEntry2] : 3 months f/u for left ear, Patient states has not applied the powder

## 2020-12-16 PROBLEM — H66.90 EAR INFECTION: Status: RESOLVED | Noted: 2017-09-15 | Resolved: 2020-12-16

## 2021-01-08 ENCOUNTER — APPOINTMENT (OUTPATIENT)
Dept: OTOLARYNGOLOGY | Facility: CLINIC | Age: 21
End: 2021-01-08

## 2021-01-19 ENCOUNTER — APPOINTMENT (OUTPATIENT)
Dept: OTOLARYNGOLOGY | Facility: CLINIC | Age: 21
End: 2021-01-19
Payer: COMMERCIAL

## 2021-01-19 VITALS
BODY MASS INDEX: 29.16 KG/M2 | HEART RATE: 69 BPM | WEIGHT: 175 LBS | HEIGHT: 65 IN | DIASTOLIC BLOOD PRESSURE: 87 MMHG | SYSTOLIC BLOOD PRESSURE: 126 MMHG

## 2021-01-19 DIAGNOSIS — K21.9 GASTRO-ESOPHAGEAL REFLUX DISEASE W/OUT ESOPHAGITIS: ICD-10-CM

## 2021-01-19 PROCEDURE — 31575 DIAGNOSTIC LARYNGOSCOPY: CPT

## 2021-01-19 PROCEDURE — 69210 REMOVE IMPACTED EAR WAX UNI: CPT

## 2021-01-19 PROCEDURE — 99072 ADDL SUPL MATRL&STAF TM PHE: CPT

## 2021-01-19 PROCEDURE — 99213 OFFICE O/P EST LOW 20 MIN: CPT | Mod: 25

## 2021-01-19 NOTE — REASON FOR VISIT
[Initial Consultation] : an initial consultation for [FreeTextEntry2] : referred by Dr. Lopez Angel for dysphagia.

## 2021-01-19 NOTE — PHYSICAL EXAM
[Midline] : trachea located in midline position [Laryngoscopy Performed] : laryngoscopy was performed, see procedure section for findings [Normal] : no rashes [de-identified] : s/p Cleft lip and palate repair.  Persistent bifid uvula present.

## 2021-01-19 NOTE — CONSULT LETTER
[Dear  ___] : Dear  [unfilled], [Consult Letter:] : I had the pleasure of evaluating your patient, [unfilled]. [Please see my note below.] : Please see my note below. [Consult Closing:] : Thank you very much for allowing me to participate in the care of this patient.  If you have any questions, please do not hesitate to contact me. [Sincerely,] : Sincerely, [DrShayy  ___] : Dr. WALKER [FreeTextEntry2] : Dr. Lopez Angel ([facility] physician)\par  [FreeTextEntry3] : Himanshu Heller MD, FACS\par Chief of Otolaryngology at [facility]\par \par Dept. of Otolaryngology\par [facility]\par   [___] : [unfilled]

## 2021-01-19 NOTE — PROCEDURE
[Unable to Cooperate with Mirror] : patient unable to cooperate with mirror [Dysphagia] : dysphagia not clearly evaluated by indirect laryngoscopy [Globus] : globus [Topical Lidocaine] : topical lidocaine [Oxymetazoline HCl] : oxymetazoline HCl [Flexible Endoscope] : examined with the flexible endoscope [Serial Number: ___] : Serial Number: [unfilled] [] : inflamed bilaterally [Normal] : the false vocal folds were pink and regular, the ventricular sulcus was open, the true vocal folds were glistening white, tense and of equal length, mobility, and height [True Vocal Cords Paralysis] : no true vocal cord paralysis [True Vocal Cords Erythematous] : no true vocal cord edema [True Vocal Cords Meadows's Nodules] : no true vocal cord nodules [Glottis Arytenoid Cartilages] : no arytenoid granulomas [Glottis Arytenoid Cartilages Erythema] : bilateral arytenoid ~M erythema [Interarytenoid Edema] : interarytenoid area edematous [FreeTextEntry9] : + Cobblestoning

## 2021-01-19 NOTE — HISTORY OF PRESENT ILLNESS
[de-identified] : 20 year female referred by Dr. Lopez Angel for dysphagia. Reports dysphagia with solids for 6 months. States always has to clear throat after she eats, feels globus sensation. Reports occasional odynophagia with solids. Denies dyspnea, dysphonia.

## 2021-02-04 ENCOUNTER — NON-APPOINTMENT (OUTPATIENT)
Age: 21
End: 2021-02-04

## 2021-02-04 ENCOUNTER — APPOINTMENT (OUTPATIENT)
Dept: INTERNAL MEDICINE | Facility: CLINIC | Age: 21
End: 2021-02-04
Payer: COMMERCIAL

## 2021-02-04 VITALS
BODY MASS INDEX: 28.66 KG/M2 | TEMPERATURE: 98.1 F | OXYGEN SATURATION: 98 % | HEIGHT: 65 IN | HEART RATE: 82 BPM | WEIGHT: 172 LBS | DIASTOLIC BLOOD PRESSURE: 70 MMHG | SYSTOLIC BLOOD PRESSURE: 128 MMHG

## 2021-02-04 DIAGNOSIS — R13.10 DYSPHAGIA, UNSPECIFIED: ICD-10-CM

## 2021-02-04 PROCEDURE — 36415 COLL VENOUS BLD VENIPUNCTURE: CPT

## 2021-02-04 PROCEDURE — 99072 ADDL SUPL MATRL&STAF TM PHE: CPT

## 2021-02-04 PROCEDURE — 93000 ELECTROCARDIOGRAM COMPLETE: CPT

## 2021-02-04 PROCEDURE — 99395 PREV VISIT EST AGE 18-39: CPT | Mod: 25

## 2021-02-08 LAB
ALBUMIN SERPL ELPH-MCNC: 4.6 G/DL
ALP BLD-CCNC: 92 U/L
ALT SERPL-CCNC: 11 U/L
ANION GAP SERPL CALC-SCNC: 12 MMOL/L
APPEARANCE: ABNORMAL
AST SERPL-CCNC: 19 U/L
BACTERIA: ABNORMAL
BASOPHILS # BLD AUTO: 0.03 K/UL
BASOPHILS NFR BLD AUTO: 0.5 %
BILIRUB SERPL-MCNC: 0.6 MG/DL
BILIRUBIN URINE: NEGATIVE
BLOOD URINE: NEGATIVE
BUN SERPL-MCNC: 17 MG/DL
CALCIUM SERPL-MCNC: 9.7 MG/DL
CHLORIDE SERPL-SCNC: 105 MMOL/L
CHOLEST SERPL-MCNC: 131 MG/DL
CO2 SERPL-SCNC: 21 MMOL/L
COLOR: ABNORMAL
CREAT SERPL-MCNC: 0.63 MG/DL
EOSINOPHIL # BLD AUTO: 0.06 K/UL
EOSINOPHIL NFR BLD AUTO: 1.1 %
GLUCOSE QUALITATIVE U: NEGATIVE
GLUCOSE SERPL-MCNC: 81 MG/DL
HCT VFR BLD CALC: 42.2 %
HDLC SERPL-MCNC: 45 MG/DL
HGB BLD-MCNC: 13 G/DL
HYALINE CASTS: 3 /LPF
IMM GRANULOCYTES NFR BLD AUTO: 0.2 %
KETONES URINE: NEGATIVE
LDLC SERPL CALC-MCNC: 75 MG/DL
LEUKOCYTE ESTERASE URINE: ABNORMAL
LYMPHOCYTES # BLD AUTO: 1.35 K/UL
LYMPHOCYTES NFR BLD AUTO: 23.9 %
MAN DIFF?: NORMAL
MCHC RBC-ENTMCNC: 27.3 PG
MCHC RBC-ENTMCNC: 30.8 GM/DL
MCV RBC AUTO: 88.7 FL
MICROSCOPIC-UA: NORMAL
MONOCYTES # BLD AUTO: 0.36 K/UL
MONOCYTES NFR BLD AUTO: 6.4 %
NEUTROPHILS # BLD AUTO: 3.84 K/UL
NEUTROPHILS NFR BLD AUTO: 67.9 %
NITRITE URINE: NEGATIVE
NONHDLC SERPL-MCNC: 86 MG/DL
PH URINE: 6
PLATELET # BLD AUTO: 259 K/UL
POTASSIUM SERPL-SCNC: 4.2 MMOL/L
PROT SERPL-MCNC: 7.3 G/DL
PROTEIN URINE: NORMAL
RBC # BLD: 4.76 M/UL
RBC # FLD: 13 %
RED BLOOD CELLS URINE: 6 /HPF
SODIUM SERPL-SCNC: 138 MMOL/L
SPECIFIC GRAVITY URINE: 1.04
SQUAMOUS EPITHELIAL CELLS: 8 /HPF
T4 FREE SERPL-MCNC: 1.4 NG/DL
TRIGL SERPL-MCNC: 56 MG/DL
TSH SERPL-ACNC: 1.15 UIU/ML
UROBILINOGEN URINE: NORMAL
WBC # FLD AUTO: 5.65 K/UL
WHITE BLOOD CELLS URINE: 9 /HPF

## 2021-02-16 ENCOUNTER — APPOINTMENT (OUTPATIENT)
Dept: OTOLARYNGOLOGY | Facility: CLINIC | Age: 21
End: 2021-02-16

## 2021-02-21 ENCOUNTER — APPOINTMENT (OUTPATIENT)
Dept: DISASTER EMERGENCY | Facility: CLINIC | Age: 21
End: 2021-02-21

## 2021-02-22 LAB — SARS-COV-2 N GENE NPH QL NAA+PROBE: NOT DETECTED

## 2021-02-24 ENCOUNTER — APPOINTMENT (OUTPATIENT)
Dept: OTOLARYNGOLOGY | Facility: CLINIC | Age: 21
End: 2021-02-24

## 2021-02-25 ENCOUNTER — APPOINTMENT (OUTPATIENT)
Dept: GASTROENTEROLOGY | Facility: CLINIC | Age: 21
End: 2021-02-25
Payer: COMMERCIAL

## 2021-02-25 ENCOUNTER — LABORATORY RESULT (OUTPATIENT)
Age: 21
End: 2021-02-25

## 2021-02-25 PROCEDURE — 99072 ADDL SUPL MATRL&STAF TM PHE: CPT

## 2021-02-25 PROCEDURE — 43239 EGD BIOPSY SINGLE/MULTIPLE: CPT

## 2021-02-25 PROCEDURE — 81025 URINE PREGNANCY TEST: CPT

## 2021-02-26 LAB
HCG UR QL: NEGATIVE
QUALITY CONTROL: YES

## 2021-03-04 ENCOUNTER — APPOINTMENT (OUTPATIENT)
Dept: OTOLARYNGOLOGY | Facility: CLINIC | Age: 21
End: 2021-03-04
Payer: COMMERCIAL

## 2021-03-04 VITALS
DIASTOLIC BLOOD PRESSURE: 70 MMHG | HEIGHT: 65 IN | WEIGHT: 172 LBS | TEMPERATURE: 98 F | SYSTOLIC BLOOD PRESSURE: 125 MMHG | BODY MASS INDEX: 28.66 KG/M2 | HEART RATE: 75 BPM

## 2021-03-04 PROCEDURE — 99072 ADDL SUPL MATRL&STAF TM PHE: CPT

## 2021-03-04 PROCEDURE — 69210 REMOVE IMPACTED EAR WAX UNI: CPT

## 2021-03-04 PROCEDURE — 99213 OFFICE O/P EST LOW 20 MIN: CPT | Mod: 25

## 2021-03-04 NOTE — HISTORY OF PRESENT ILLNESS
[de-identified] : Reports resolution of drainage and clogged ear sensation in left ear since taking antibiotic drops.

## 2021-03-04 NOTE — PHYSICAL EXAM
[de-identified] : minor ceruminous debris b/l lateral ear canal [de-identified] : no evidence of myringitis in either ear

## 2021-03-04 NOTE — REASON FOR VISIT
[Subsequent Evaluation] : a subsequent evaluation for [FreeTextEntry2] : f/u for left ear, patient reported last L ear infection was 3 weeks ago

## 2021-03-18 ENCOUNTER — APPOINTMENT (OUTPATIENT)
Dept: OTOLARYNGOLOGY | Facility: CLINIC | Age: 21
End: 2021-03-18
Payer: COMMERCIAL

## 2021-03-18 VITALS
BODY MASS INDEX: 29.16 KG/M2 | WEIGHT: 175 LBS | HEIGHT: 65 IN | SYSTOLIC BLOOD PRESSURE: 121 MMHG | DIASTOLIC BLOOD PRESSURE: 79 MMHG | HEART RATE: 77 BPM

## 2021-03-18 PROCEDURE — 69210 REMOVE IMPACTED EAR WAX UNI: CPT

## 2021-03-18 PROCEDURE — 99072 ADDL SUPL MATRL&STAF TM PHE: CPT

## 2021-03-18 PROCEDURE — 31579 LARYNGOSCOPY TELESCOPIC: CPT

## 2021-03-18 PROCEDURE — 99214 OFFICE O/P EST MOD 30 MIN: CPT | Mod: 25

## 2021-03-18 RX ORDER — OMEPRAZOLE 40 MG/1
40 CAPSULE, DELAYED RELEASE ORAL
Qty: 30 | Refills: 3 | Status: DISCONTINUED | COMMUNITY
Start: 2021-02-25 | End: 2021-03-18

## 2021-03-18 RX ORDER — CIPROFLOXACIN AND DEXAMETHASONE 3; 1 MG/ML; MG/ML
0.3-0.1 SUSPENSION/ DROPS AURICULAR (OTIC)
Qty: 1 | Refills: 1 | Status: DISCONTINUED | COMMUNITY
Start: 2021-02-18 | End: 2021-03-18

## 2021-03-18 RX ORDER — OFLOXACIN OTIC 3 MG/ML
0.3 SOLUTION AURICULAR (OTIC) TWICE DAILY
Qty: 1 | Refills: 2 | Status: DISCONTINUED | COMMUNITY
Start: 2021-02-24 | End: 2021-03-18

## 2021-03-18 RX ORDER — FLUCONAZOLE 150 MG/1
150 TABLET ORAL
Qty: 1 | Refills: 1 | Status: DISCONTINUED | COMMUNITY
Start: 2021-02-08 | End: 2021-03-18

## 2021-03-18 RX ORDER — MOMETASONE FUROATE 1 MG/ML
0.1 SOLUTION TOPICAL
Qty: 1 | Refills: 0 | Status: DISCONTINUED | COMMUNITY
Start: 2020-03-20 | End: 2021-03-18

## 2021-03-18 NOTE — PHYSICAL EXAM
[Midline] : trachea located in midline position [Laryngoscopy Performed] : laryngoscopy was performed, see procedure section for findings [de-identified] : cleft lip/cleft palate; bifid uvula, short [Normal] : no rashes

## 2021-03-18 NOTE — HISTORY OF PRESENT ILLNESS
[de-identified] : 20 year old female previously seen by Dr Heller for dysphagia. Reports dysphagia with both solids and liquids for the past 6 months. Reports globus sensation and throat clearing. Reports intermittent odynophagia, with solids. Reports EGD 02/25/2021 Impression: Normal. Reports being prescribed omeprazole but did not take it. Denies dyspnea, dysphonia, recent throat infections. Follows with Jeanne for recurrent ear infections. No regurgitation, no hemoptysis.\par Worst globus after eating.\par

## 2021-03-18 NOTE — CONSULT LETTER
[Dear  ___] : Dear  [unfilled], [Consult Letter:] : I had the pleasure of evaluating your patient, [unfilled]. [Please see my note below.] : Please see my note below. [Consult Closing:] : Thank you very much for allowing me to participate in the care of this patient.  If you have any questions, please do not hesitate to contact me. [Sincerely,] : Sincerely, [FreeTextEntry3] : Surinder Najera MD, PhD\par Chief, Division of Laryngology\par Department of Otolaryngology\par [facility]\par Pediatric Otolaryngology, [facility]\par  of Otolaryngology\par [facility] [employer] [facility]\par \par \par

## 2021-03-26 ENCOUNTER — APPOINTMENT (OUTPATIENT)
Dept: INTERNAL MEDICINE | Facility: CLINIC | Age: 21
End: 2021-03-26
Payer: COMMERCIAL

## 2021-03-26 VITALS
BODY MASS INDEX: 29.16 KG/M2 | TEMPERATURE: 98.1 F | DIASTOLIC BLOOD PRESSURE: 60 MMHG | WEIGHT: 175 LBS | OXYGEN SATURATION: 97 % | SYSTOLIC BLOOD PRESSURE: 122 MMHG | HEIGHT: 65 IN | HEART RATE: 74 BPM

## 2021-03-26 DIAGNOSIS — L02.411 CUTANEOUS ABSCESS OF RIGHT AXILLA: ICD-10-CM

## 2021-03-26 PROCEDURE — 99213 OFFICE O/P EST LOW 20 MIN: CPT

## 2021-03-26 PROCEDURE — 99072 ADDL SUPL MATRL&STAF TM PHE: CPT

## 2021-04-09 ENCOUNTER — APPOINTMENT (OUTPATIENT)
Dept: DERMATOLOGY | Facility: CLINIC | Age: 21
End: 2021-04-09

## 2021-04-27 ENCOUNTER — APPOINTMENT (OUTPATIENT)
Dept: DERMATOLOGY | Facility: CLINIC | Age: 21
End: 2021-04-27
Payer: COMMERCIAL

## 2021-04-27 VITALS — HEIGHT: 64 IN | BODY MASS INDEX: 29.88 KG/M2 | WEIGHT: 175 LBS

## 2021-04-27 DIAGNOSIS — D22.9 MELANOCYTIC NEVI, UNSPECIFIED: ICD-10-CM

## 2021-04-27 DIAGNOSIS — L73.2 HIDRADENITIS SUPPURATIVA: ICD-10-CM

## 2021-04-27 PROCEDURE — G0444 DEPRESSION SCREEN ANNUAL: CPT | Mod: 59

## 2021-04-27 PROCEDURE — 99072 ADDL SUPL MATRL&STAF TM PHE: CPT

## 2021-04-27 PROCEDURE — 99204 OFFICE O/P NEW MOD 45 MIN: CPT | Mod: 25

## 2021-05-06 ENCOUNTER — APPOINTMENT (OUTPATIENT)
Dept: OTOLARYNGOLOGY | Facility: CLINIC | Age: 21
End: 2021-05-06
Payer: COMMERCIAL

## 2021-05-06 PROCEDURE — 69210 REMOVE IMPACTED EAR WAX UNI: CPT

## 2021-05-06 PROCEDURE — 99214 OFFICE O/P EST MOD 30 MIN: CPT | Mod: 25

## 2021-05-06 PROCEDURE — 99072 ADDL SUPL MATRL&STAF TM PHE: CPT

## 2021-05-06 NOTE — HISTORY OF PRESENT ILLNESS
[de-identified] : 20F f/u Left OE, ear canal stenosis and Left TM perforation\par Rolands powder applied last visit\par was doing well until three days ago until the ear became clogged, used drops and is now better\par Pt. denies pain, discharge or changes in her hearing.

## 2021-05-06 NOTE — PROCEDURE
[FreeTextEntry3] : Procedure note:  Bilateral cerumenectomy\par \par May 06, 2021 \par \par Description of Procedure:   Bilateral cerumen impactions were noted requiring debridement under the operating microscope using otologic instrumentation.  The patient tolerated the procedure without complications.\par \par

## 2021-05-07 ENCOUNTER — APPOINTMENT (OUTPATIENT)
Dept: OBGYN | Facility: CLINIC | Age: 21
End: 2021-05-07
Payer: COMMERCIAL

## 2021-05-07 VITALS
BODY MASS INDEX: 29.88 KG/M2 | WEIGHT: 175 LBS | DIASTOLIC BLOOD PRESSURE: 76 MMHG | HEIGHT: 64 IN | SYSTOLIC BLOOD PRESSURE: 111 MMHG

## 2021-05-07 DIAGNOSIS — Z87.440 PERSONAL HISTORY OF URINARY (TRACT) INFECTIONS: ICD-10-CM

## 2021-05-07 DIAGNOSIS — Z01.419 ENCOUNTER FOR GYNECOLOGICAL EXAMINATION (GENERAL) (ROUTINE) W/OUT ABNORMAL FINDINGS: ICD-10-CM

## 2021-05-07 PROCEDURE — 99385 PREV VISIT NEW AGE 18-39: CPT

## 2021-05-07 PROCEDURE — 99072 ADDL SUPL MATRL&STAF TM PHE: CPT

## 2021-05-07 NOTE — HISTORY OF PRESENT ILLNESS
[FreeTextEntry1] : 19 y/o  LMP \par Pt c/o vaginal burning and itching for some time, primary care prescribed a cream, helped a little \par complete course of Gardasil vaccine\par Patient not sexually active, discussed with Mother present and discussed without Mother present.\par Patient ogden not use tampons and declies a pelvic exam at this time \par Patient agrees to vaginal swab for affirm \par Irrittaion connor anterior

## 2021-05-07 NOTE — PHYSICAL EXAM
[Examination Of The Breasts] : a normal appearance [No Masses] : no breast masses were palpable [Labia Majora] : normal [Normal] : normal [FreeTextEntry4] : annual hymen

## 2021-05-07 NOTE — DISCUSSION/SUMMARY
[FreeTextEntry1] : 21 y/o  LMP \par UTI /vaginitis , \par Affirm, UA, C/S, GC, CHl\par UTD with gardasil \par discussed perienal care, increased fluids, probiotics \par Rx Macrobid and Diflucan\par Refer for pelvic u/S T/A only to check femal anatomy \par no pelvic exam today \par F/u 1 year/PRN

## 2021-05-10 ENCOUNTER — NON-APPOINTMENT (OUTPATIENT)
Age: 21
End: 2021-05-10

## 2021-05-10 LAB
BACTERIA UR CULT: NORMAL
C TRACH RRNA SPEC QL NAA+PROBE: NOT DETECTED
CANDIDA VAG CYTO: NOT DETECTED
G VAGINALIS+PREV SP MTYP VAG QL MICRO: NOT DETECTED
N GONORRHOEA RRNA SPEC QL NAA+PROBE: NOT DETECTED
SOURCE AMPLIFICATION: NORMAL
T VAGINALIS VAG QL WET PREP: NOT DETECTED

## 2021-05-13 ENCOUNTER — OUTPATIENT (OUTPATIENT)
Dept: OUTPATIENT SERVICES | Facility: HOSPITAL | Age: 21
LOS: 1 days | End: 2021-05-13
Payer: COMMERCIAL

## 2021-05-13 ENCOUNTER — APPOINTMENT (OUTPATIENT)
Dept: ULTRASOUND IMAGING | Facility: CLINIC | Age: 21
End: 2021-05-13
Payer: COMMERCIAL

## 2021-05-13 DIAGNOSIS — N39.0 URINARY TRACT INFECTION, SITE NOT SPECIFIED: ICD-10-CM

## 2021-05-13 DIAGNOSIS — M26.02 MAXILLARY HYPOPLASIA: Chronic | ICD-10-CM

## 2021-05-13 PROCEDURE — 76856 US EXAM PELVIC COMPLETE: CPT

## 2021-05-13 PROCEDURE — 76856 US EXAM PELVIC COMPLETE: CPT | Mod: 26

## 2021-05-20 ENCOUNTER — APPOINTMENT (OUTPATIENT)
Dept: PLASTIC SURGERY | Facility: CLINIC | Age: 21
End: 2021-05-20
Payer: COMMERCIAL

## 2021-05-20 VITALS — HEIGHT: 65 IN | WEIGHT: 175 LBS | BODY MASS INDEX: 29.16 KG/M2

## 2021-05-20 PROCEDURE — 99203 OFFICE O/P NEW LOW 30 MIN: CPT

## 2021-05-24 NOTE — HISTORY OF PRESENT ILLNESS
[FreeTextEntry1] : 20 year old female presents with h/o unilateral cleft lip and nasal deformity and cleft palate.  Patient reports history of cleft lip repair at about 2 to 3 months of age, then cleft palate was repaired at 1 to 1 years of age?  Patient reports alveolar bone graft as a school-aged child and then a rhinoplasty done in [facility] at 14 or 15 years of age.  Patient also reports having jaw surgery here in [place] also around 15 years of age.\par Patient reports difficulty breathing from both sides of her nose but the right is worse than the left.  This has been present as long as she canremember and did not improve significantly post septorhinoplasty.  This patient has tried nasal sprays without resolution of symptoms.  Patient did many years of speech therapy.  There is some mild hypernasality to her speech but she is overall clear and understandable.\par

## 2021-06-15 ENCOUNTER — APPOINTMENT (OUTPATIENT)
Dept: INTERNAL MEDICINE | Facility: CLINIC | Age: 21
End: 2021-06-15
Payer: COMMERCIAL

## 2021-06-15 ENCOUNTER — NON-APPOINTMENT (OUTPATIENT)
Age: 21
End: 2021-06-15

## 2021-06-15 VITALS
HEART RATE: 82 BPM | SYSTOLIC BLOOD PRESSURE: 122 MMHG | BODY MASS INDEX: 29.66 KG/M2 | DIASTOLIC BLOOD PRESSURE: 80 MMHG | WEIGHT: 178 LBS | TEMPERATURE: 98.1 F | HEIGHT: 65 IN | OXYGEN SATURATION: 99 %

## 2021-06-15 PROCEDURE — 99214 OFFICE O/P EST MOD 30 MIN: CPT

## 2021-06-16 LAB
ALBUMIN SERPL ELPH-MCNC: 4.6 G/DL
ALP BLD-CCNC: 103 U/L
ALT SERPL-CCNC: 12 U/L
ANION GAP SERPL CALC-SCNC: 16 MMOL/L
AST SERPL-CCNC: 18 U/L
BASOPHILS # BLD AUTO: 0.04 K/UL
BASOPHILS NFR BLD AUTO: 0.7 %
BILIRUB SERPL-MCNC: 0.4 MG/DL
BUN SERPL-MCNC: 16 MG/DL
CALCIUM SERPL-MCNC: 9.6 MG/DL
CHLORIDE SERPL-SCNC: 103 MMOL/L
CO2 SERPL-SCNC: 21 MMOL/L
CREAT SERPL-MCNC: 0.54 MG/DL
EOSINOPHIL # BLD AUTO: 0.06 K/UL
EOSINOPHIL NFR BLD AUTO: 1 %
GLUCOSE SERPL-MCNC: 86 MG/DL
HCT VFR BLD CALC: 43.3 %
HGB BLD-MCNC: 13.5 G/DL
IMM GRANULOCYTES NFR BLD AUTO: 0.3 %
LYMPHOCYTES # BLD AUTO: 1.58 K/UL
LYMPHOCYTES NFR BLD AUTO: 26.8 %
MAN DIFF?: NORMAL
MCHC RBC-ENTMCNC: 27.4 PG
MCHC RBC-ENTMCNC: 31.2 GM/DL
MCV RBC AUTO: 87.8 FL
MONOCYTES # BLD AUTO: 0.31 K/UL
MONOCYTES NFR BLD AUTO: 5.3 %
NEUTROPHILS # BLD AUTO: 3.89 K/UL
NEUTROPHILS NFR BLD AUTO: 65.9 %
PLATELET # BLD AUTO: 248 K/UL
POTASSIUM SERPL-SCNC: 4.1 MMOL/L
PROT SERPL-MCNC: 7.6 G/DL
RBC # BLD: 4.93 M/UL
RBC # FLD: 13.2 %
SODIUM SERPL-SCNC: 139 MMOL/L
T4 FREE SERPL-MCNC: 1.4 NG/DL
TSH SERPL-ACNC: 1.63 UIU/ML
WBC # FLD AUTO: 5.9 K/UL

## 2021-07-09 ENCOUNTER — OUTPATIENT (OUTPATIENT)
Dept: OUTPATIENT SERVICES | Facility: HOSPITAL | Age: 21
LOS: 1 days | End: 2021-07-09

## 2021-07-09 VITALS
SYSTOLIC BLOOD PRESSURE: 124 MMHG | WEIGHT: 175.93 LBS | RESPIRATION RATE: 16 BRPM | TEMPERATURE: 98 F | DIASTOLIC BLOOD PRESSURE: 80 MMHG | HEIGHT: 65 IN | HEART RATE: 73 BPM | OXYGEN SATURATION: 98 %

## 2021-07-09 DIAGNOSIS — H93.8X9 OTHER SPECIFIED DISORDERS OF EAR, UNSPECIFIED EAR: ICD-10-CM

## 2021-07-09 DIAGNOSIS — M26.02 MAXILLARY HYPOPLASIA: Chronic | ICD-10-CM

## 2021-07-09 DIAGNOSIS — Z87.730 PERSONAL HISTORY OF (CORRECTED) CLEFT LIP AND PALATE: ICD-10-CM

## 2021-07-09 DIAGNOSIS — Q36.9 CLEFT LIP, UNILATERAL: ICD-10-CM

## 2021-07-09 LAB
BLD GP AB SCN SERPL QL: NEGATIVE — SIGNIFICANT CHANGE UP
HCG UR QL: NEGATIVE — SIGNIFICANT CHANGE UP
HCT VFR BLD CALC: 41.2 % — SIGNIFICANT CHANGE UP (ref 34.5–45)
HGB BLD-MCNC: 13.1 G/DL — SIGNIFICANT CHANGE UP (ref 11.5–15.5)
MCHC RBC-ENTMCNC: 27.6 PG — SIGNIFICANT CHANGE UP (ref 27–34)
MCHC RBC-ENTMCNC: 31.8 GM/DL — LOW (ref 32–36)
MCV RBC AUTO: 86.7 FL — SIGNIFICANT CHANGE UP (ref 80–100)
NRBC # BLD: 0 /100 WBCS — SIGNIFICANT CHANGE UP
NRBC # FLD: 0 K/UL — SIGNIFICANT CHANGE UP
PLATELET # BLD AUTO: 298 K/UL — SIGNIFICANT CHANGE UP (ref 150–400)
RBC # BLD: 4.75 M/UL — SIGNIFICANT CHANGE UP (ref 3.8–5.2)
RBC # FLD: 12.8 % — SIGNIFICANT CHANGE UP (ref 10.3–14.5)
RH IG SCN BLD-IMP: POSITIVE — SIGNIFICANT CHANGE UP
WBC # BLD: 7.19 K/UL — SIGNIFICANT CHANGE UP (ref 3.8–10.5)
WBC # FLD AUTO: 7.19 K/UL — SIGNIFICANT CHANGE UP (ref 3.8–10.5)

## 2021-07-09 NOTE — H&P PST ADULT - ENMT COMMENTS
hx of cleft lip and palpate at birth.  Hx of repair in infancy and several subsequent revisions. Pt reports this surgery will be her last revision.  Per pt, she also has difficulty breathing through left nostril.   Scheduled for Cleft lip revision, outfracture left turbinate bilateral TM's dull, no erythema, no drainage.  Cleft palate repair

## 2021-07-09 NOTE — H&P PST ADULT - NSANTHOSAYNRD_GEN_A_CORE
No. IGGY screening performed.  STOP BANG Legend: 0-2 = LOW Risk; 3-4 = INTERMEDIATE Risk; 5-8 = HIGH Risk

## 2021-07-09 NOTE — H&P PST ADULT - LAST CARDIAC ANGIOGRAM/IMAGING

## 2021-07-09 NOTE — H&P PST ADULT - NSICDXPROBLEM_GEN_ALL_CORE_FT
PROBLEM DIAGNOSES  Problem: H/O cleft lip repair  Assessment and Plan: Cleft lip revision, outfracture left turbinate  CBC UCG T&S  pre-op instructions given and explained    Problem: Ear congestion  Assessment and Plan: Pt reports bilateral ears feel clogged x1 day.  Pt has appt to see PMD for evaluation.  I spoke with Dr. Damir Johnston office and infroemd of above       PROBLEM DIAGNOSES  Problem: H/O cleft lip repair  Assessment and Plan: Cleft lip revision, outfracture left turbinate  CBC UCG T&S  pre-op instructions given and explained    Problem: Ear congestion  Assessment and Plan: Pt reports bilateral ears feel clogged x1 day.  Pt has appt to see PMD for evaluation.  I spoke with Dr. Damir Johnston office and informed of above.

## 2021-07-09 NOTE — H&P PST ADULT - NSICDXPASTSURGICALHX_GEN_ALL_CORE_FT
PAST SURGICAL HISTORY:  Cleft Palate 3mnths, 6mnths and [age] year of age.   Cleft lip/palate repair with Dr. Elsy Osman. no complications.  2 palate surgeries with Dr. Bailey, most recent at 9 years of age.   Nasal surgery in [place] ([facility]) 11/2016    Ear Problem [facility].   Left ear perforated ear drum repair at 6 years of age.   no complications.    Maxillary hypoplasia s/p Lefort procedure in 6/2016.

## 2021-07-09 NOTE — H&P PST ADULT - NSICDXPASTMEDICALHX_GEN_ALL_CORE_FT
PAST MEDICAL HISTORY:  Asymmetrical Hearing Loss     Cleft hard palate with lip as well    Cleft lip     Language barrier     Mandibular hyperplasia     Maxillary hypoplasia      PAST MEDICAL HISTORY:  Asymmetrical Hearing Loss     Cleft hard palate with lip as well    Cleft lip     History of frequent ear infections     Language barrier     Mandibular hyperplasia     Maxillary hypoplasia

## 2021-07-09 NOTE — H&P PST ADULT - ENT GEN HX ROS MEA POS PC
Pt reports bilateral ears feel clogged since yesterday.  Pt has appt with  PMD  on 7/12/21 or garrett

## 2021-07-09 NOTE — H&P PST ADULT - HISTORY OF PRESENT ILLNESS
19 y/o female with hx of cleft lip and palpate at birth.  Hx of repair in infancy and several subsequent revisions. Pt reports this surgery will be her last revision.  Per pt, she also has difficulty breathing through left nostril.   Scheduled for Cleft lip revision, outfracture left turbinate

## 2021-07-12 ENCOUNTER — APPOINTMENT (OUTPATIENT)
Dept: OTOLARYNGOLOGY | Facility: CLINIC | Age: 21
End: 2021-07-12
Payer: COMMERCIAL

## 2021-07-12 ENCOUNTER — APPOINTMENT (OUTPATIENT)
Dept: INTERNAL MEDICINE | Facility: CLINIC | Age: 21
End: 2021-07-12
Payer: COMMERCIAL

## 2021-07-12 VITALS
OXYGEN SATURATION: 98 % | HEIGHT: 65 IN | HEART RATE: 74 BPM | TEMPERATURE: 98.1 F | SYSTOLIC BLOOD PRESSURE: 118 MMHG | BODY MASS INDEX: 29.99 KG/M2 | DIASTOLIC BLOOD PRESSURE: 70 MMHG | WEIGHT: 180 LBS

## 2021-07-12 VITALS
DIASTOLIC BLOOD PRESSURE: 83 MMHG | BODY MASS INDEX: 29.66 KG/M2 | HEIGHT: 65 IN | SYSTOLIC BLOOD PRESSURE: 119 MMHG | WEIGHT: 178 LBS | HEART RATE: 67 BPM

## 2021-07-12 PROBLEM — Z86.69 PERSONAL HISTORY OF OTHER DISEASES OF THE NERVOUS SYSTEM AND SENSE ORGANS: Chronic | Status: ACTIVE | Noted: 2021-07-09

## 2021-07-12 PROCEDURE — 99214 OFFICE O/P EST MOD 30 MIN: CPT | Mod: 25

## 2021-07-12 PROCEDURE — 99213 OFFICE O/P EST LOW 20 MIN: CPT

## 2021-07-12 PROCEDURE — 69210 REMOVE IMPACTED EAR WAX UNI: CPT

## 2021-07-12 NOTE — HISTORY OF PRESENT ILLNESS
[de-identified] : 20F f/u Left OE, ear canal stenosis and Left TM perforation\par received Wilfred powder in May and used once, only when clogged\par Left ear became clogged again last week, with associated pain and dizziness\par dizziness associated with movement and lasts for about 15 minutes at a time\par scheduled for cleft lip repair with Damir this Wednesday\par \par Cerumen accumulation bilaterally with no signs of infection\par Cerumen removal of both ears

## 2021-07-12 NOTE — REASON FOR VISIT
[Subsequent Evaluation] : a subsequent evaluation for [FreeTextEntry2] : Patient here to check on clogged ears

## 2021-07-13 ENCOUNTER — TRANSCRIPTION ENCOUNTER (OUTPATIENT)
Age: 21
End: 2021-07-13

## 2021-07-14 ENCOUNTER — APPOINTMENT (OUTPATIENT)
Dept: PLASTIC SURGERY | Facility: HOSPITAL | Age: 21
End: 2021-07-14
Payer: COMMERCIAL

## 2021-07-14 ENCOUNTER — OUTPATIENT (OUTPATIENT)
Dept: OUTPATIENT SERVICES | Facility: HOSPITAL | Age: 21
LOS: 1 days | Discharge: HOME | End: 2021-07-14

## 2021-07-14 VITALS
TEMPERATURE: 99 F | HEART RATE: 76 BPM | SYSTOLIC BLOOD PRESSURE: 118 MMHG | RESPIRATION RATE: 14 BRPM | HEIGHT: 65 IN | DIASTOLIC BLOOD PRESSURE: 74 MMHG | OXYGEN SATURATION: 98 % | WEIGHT: 175.93 LBS

## 2021-07-14 VITALS
SYSTOLIC BLOOD PRESSURE: 118 MMHG | OXYGEN SATURATION: 98 % | RESPIRATION RATE: 16 BRPM | DIASTOLIC BLOOD PRESSURE: 77 MMHG | HEART RATE: 76 BPM | TEMPERATURE: 98 F

## 2021-07-14 DIAGNOSIS — M26.02 MAXILLARY HYPOPLASIA: Chronic | ICD-10-CM

## 2021-07-14 DIAGNOSIS — Q36.9 CLEFT LIP, UNILATERAL: ICD-10-CM

## 2021-07-14 PROCEDURE — 30465 REPAIR NASAL STENOSIS: CPT

## 2021-07-14 PROCEDURE — 40720 REPAIR CLEFT LIP/NASAL: CPT

## 2021-07-14 RX ORDER — OXYCODONE HYDROCHLORIDE 5 MG/1
1 TABLET ORAL
Qty: 10 | Refills: 0
Start: 2021-07-14

## 2021-07-14 NOTE — ASU DISCHARGE PLAN (ADULT/PEDIATRIC) - CARE PROVIDER_API CALL
Maximo Reich)  Plastic Surgery  [address] [facility], [place] [address]  Phone: [phone]  Fax: [phone]  Follow Up Time: 1 week

## 2021-07-14 NOTE — ASU DISCHARGE PLAN (ADULT/PEDIATRIC) - CALL YOUR DOCTOR IF YOU HAVE ANY OF THE FOLLOWING:
Bleeding that does not stop/Pain not relieved by Medications/Fever greater than (need to indicate Fahrenheit or Celsius)/Wound/Surgical Site with redness, or foul smelling discharge or pus Bleeding that does not stop/Swelling that gets worse/Pain not relieved by Medications/Fever greater than (need to indicate Fahrenheit or Celsius)/Wound/Surgical Site with redness, or foul smelling discharge or pus/Nausea and vomiting that does not stop/Unable to urinate/Inability to tolerate liquids or foods/Increased irritability or sluggishness

## 2021-07-14 NOTE — BRIEF OPERATIVE NOTE - NSICDXBRIEFPROCEDURE_GEN_ALL_CORE_FT
PROCEDURES:  Revision, cleft lip repair scar 14-Jul-2021 15:00:34  Bob Pond  Turbinate fracture 14-Jul-2021 15:01:31  Bob Pond

## 2021-07-14 NOTE — ASU DISCHARGE PLAN (ADULT/PEDIATRIC) - ASU DC SPECIAL INSTRUCTIONSFT
You underwent surgery on your nose and upper lip.  Over the incision in your upper lip you have a special type of band aid called steri strips. Please do not remove these, they will either fall off on their own or be removed in the office.  Please note that it is normal to have some blood staining or oozing from the steri strips.    In your right nose you will notice a small black string.  This string is connected to the nasal splint that is helping your nose heal.  Please keep this in place until you see Dr Reich in the office, it will be removed at that time.    For pain control please take the prescribed medication as instructed,  additionally you can take Tylenol and Ibuprofen as needed.     You may shower in 48 hours but please do not scrub or rub directly over the incision and pat to dry.

## 2021-07-19 ENCOUNTER — NON-APPOINTMENT (OUTPATIENT)
Age: 21
End: 2021-07-19

## 2021-07-20 ENCOUNTER — APPOINTMENT (OUTPATIENT)
Dept: PLASTIC SURGERY | Facility: CLINIC | Age: 21
End: 2021-07-20
Payer: COMMERCIAL

## 2021-07-20 ENCOUNTER — NON-APPOINTMENT (OUTPATIENT)
Age: 21
End: 2021-07-20

## 2021-07-20 PROCEDURE — 99024 POSTOP FOLLOW-UP VISIT: CPT

## 2021-07-21 ENCOUNTER — APPOINTMENT (OUTPATIENT)
Dept: INTERNAL MEDICINE | Facility: CLINIC | Age: 21
End: 2021-07-21
Payer: COMMERCIAL

## 2021-07-21 VITALS
HEIGHT: 65 IN | SYSTOLIC BLOOD PRESSURE: 122 MMHG | WEIGHT: 180 LBS | DIASTOLIC BLOOD PRESSURE: 80 MMHG | BODY MASS INDEX: 29.99 KG/M2 | TEMPERATURE: 97.8 F | OXYGEN SATURATION: 98 % | HEART RATE: 97 BPM

## 2021-07-21 PROCEDURE — 99213 OFFICE O/P EST LOW 20 MIN: CPT

## 2021-07-23 ENCOUNTER — APPOINTMENT (OUTPATIENT)
Dept: PLASTIC SURGERY | Facility: CLINIC | Age: 21
End: 2021-07-23
Payer: COMMERCIAL

## 2021-07-23 ENCOUNTER — EMERGENCY (EMERGENCY)
Facility: HOSPITAL | Age: 21
LOS: 1 days | Discharge: HOME | End: 2021-07-23
Attending: EMERGENCY MEDICINE | Admitting: EMERGENCY MEDICINE
Payer: COMMERCIAL

## 2021-07-23 VITALS
HEART RATE: 96 BPM | OXYGEN SATURATION: 100 % | SYSTOLIC BLOOD PRESSURE: 123 MMHG | DIASTOLIC BLOOD PRESSURE: 80 MMHG | TEMPERATURE: 99 F | RESPIRATION RATE: 17 BRPM

## 2021-07-23 VITALS
OXYGEN SATURATION: 100 % | DIASTOLIC BLOOD PRESSURE: 97 MMHG | TEMPERATURE: 98 F | SYSTOLIC BLOOD PRESSURE: 144 MMHG | HEART RATE: 85 BPM | HEIGHT: 65 IN

## 2021-07-23 DIAGNOSIS — M26.02 MAXILLARY HYPOPLASIA: Chronic | ICD-10-CM

## 2021-07-23 LAB
ALBUMIN SERPL ELPH-MCNC: 4.5 G/DL — SIGNIFICANT CHANGE UP (ref 3.3–5)
ALP SERPL-CCNC: 109 U/L — SIGNIFICANT CHANGE UP (ref 40–120)
ALT FLD-CCNC: 15 U/L — SIGNIFICANT CHANGE UP (ref 4–33)
ANION GAP SERPL CALC-SCNC: 13 MMOL/L — SIGNIFICANT CHANGE UP (ref 7–14)
AST SERPL-CCNC: 18 U/L — SIGNIFICANT CHANGE UP (ref 4–32)
B PERT DNA SPEC QL NAA+PROBE: SIGNIFICANT CHANGE UP
BASOPHILS # BLD AUTO: 0.02 K/UL — SIGNIFICANT CHANGE UP (ref 0–0.2)
BASOPHILS NFR BLD AUTO: 0.3 % — SIGNIFICANT CHANGE UP (ref 0–2)
BILIRUB SERPL-MCNC: 0.4 MG/DL — SIGNIFICANT CHANGE UP (ref 0.2–1.2)
BUN SERPL-MCNC: 12 MG/DL — SIGNIFICANT CHANGE UP (ref 7–23)
C PNEUM DNA SPEC QL NAA+PROBE: SIGNIFICANT CHANGE UP
CALCIUM SERPL-MCNC: 9.9 MG/DL — SIGNIFICANT CHANGE UP (ref 8.4–10.5)
CHLORIDE SERPL-SCNC: 103 MMOL/L — SIGNIFICANT CHANGE UP (ref 98–107)
CO2 SERPL-SCNC: 23 MMOL/L — SIGNIFICANT CHANGE UP (ref 22–31)
CREAT SERPL-MCNC: 0.56 MG/DL — SIGNIFICANT CHANGE UP (ref 0.5–1.3)
EOSINOPHIL # BLD AUTO: 0.17 K/UL — SIGNIFICANT CHANGE UP (ref 0–0.5)
EOSINOPHIL NFR BLD AUTO: 2.3 % — SIGNIFICANT CHANGE UP (ref 0–6)
FLUAV SUBTYP SPEC NAA+PROBE: SIGNIFICANT CHANGE UP
FLUBV RNA SPEC QL NAA+PROBE: SIGNIFICANT CHANGE UP
GLUCOSE SERPL-MCNC: 88 MG/DL — SIGNIFICANT CHANGE UP (ref 70–99)
HADV DNA SPEC QL NAA+PROBE: SIGNIFICANT CHANGE UP
HCOV 229E RNA SPEC QL NAA+PROBE: SIGNIFICANT CHANGE UP
HCOV HKU1 RNA SPEC QL NAA+PROBE: SIGNIFICANT CHANGE UP
HCOV NL63 RNA SPEC QL NAA+PROBE: SIGNIFICANT CHANGE UP
HCOV OC43 RNA SPEC QL NAA+PROBE: SIGNIFICANT CHANGE UP
HCT VFR BLD CALC: 43.4 % — SIGNIFICANT CHANGE UP (ref 34.5–45)
HGB BLD-MCNC: 13.5 G/DL — SIGNIFICANT CHANGE UP (ref 11.5–15.5)
HMPV RNA SPEC QL NAA+PROBE: SIGNIFICANT CHANGE UP
HPIV1 RNA SPEC QL NAA+PROBE: SIGNIFICANT CHANGE UP
HPIV2 RNA SPEC QL NAA+PROBE: SIGNIFICANT CHANGE UP
HPIV3 RNA SPEC QL NAA+PROBE: SIGNIFICANT CHANGE UP
HPIV4 RNA SPEC QL NAA+PROBE: SIGNIFICANT CHANGE UP
IANC: 5.04 K/UL — SIGNIFICANT CHANGE UP (ref 1.5–8.5)
IMM GRANULOCYTES NFR BLD AUTO: 0.4 % — SIGNIFICANT CHANGE UP (ref 0–1.5)
LYMPHOCYTES # BLD AUTO: 1.76 K/UL — SIGNIFICANT CHANGE UP (ref 1–3.3)
LYMPHOCYTES # BLD AUTO: 23.4 % — SIGNIFICANT CHANGE UP (ref 13–44)
MCHC RBC-ENTMCNC: 26.8 PG — LOW (ref 27–34)
MCHC RBC-ENTMCNC: 31.1 GM/DL — LOW (ref 32–36)
MCV RBC AUTO: 86.1 FL — SIGNIFICANT CHANGE UP (ref 80–100)
MONOCYTES # BLD AUTO: 0.49 K/UL — SIGNIFICANT CHANGE UP (ref 0–0.9)
MONOCYTES NFR BLD AUTO: 6.5 % — SIGNIFICANT CHANGE UP (ref 2–14)
NEUTROPHILS # BLD AUTO: 5.04 K/UL — SIGNIFICANT CHANGE UP (ref 1.8–7.4)
NEUTROPHILS NFR BLD AUTO: 67.1 % — SIGNIFICANT CHANGE UP (ref 43–77)
NRBC # BLD: 0 /100 WBCS — SIGNIFICANT CHANGE UP
NRBC # FLD: 0 K/UL — SIGNIFICANT CHANGE UP
PLATELET # BLD AUTO: 256 K/UL — SIGNIFICANT CHANGE UP (ref 150–400)
POTASSIUM SERPL-MCNC: 4.6 MMOL/L — SIGNIFICANT CHANGE UP (ref 3.5–5.3)
POTASSIUM SERPL-SCNC: 4.6 MMOL/L — SIGNIFICANT CHANGE UP (ref 3.5–5.3)
PROT SERPL-MCNC: 7.3 G/DL — SIGNIFICANT CHANGE UP (ref 6–8.3)
RAPID RVP RESULT: SIGNIFICANT CHANGE UP
RBC # BLD: 5.04 M/UL — SIGNIFICANT CHANGE UP (ref 3.8–5.2)
RBC # FLD: 12.7 % — SIGNIFICANT CHANGE UP (ref 10.3–14.5)
RSV RNA SPEC QL NAA+PROBE: SIGNIFICANT CHANGE UP
RV+EV RNA SPEC QL NAA+PROBE: SIGNIFICANT CHANGE UP
SARS-COV-2 RNA SPEC QL NAA+PROBE: SIGNIFICANT CHANGE UP
SODIUM SERPL-SCNC: 139 MMOL/L — SIGNIFICANT CHANGE UP (ref 135–145)
WBC # BLD: 7.51 K/UL — SIGNIFICANT CHANGE UP (ref 3.8–10.5)
WBC # FLD AUTO: 7.51 K/UL — SIGNIFICANT CHANGE UP (ref 3.8–10.5)

## 2021-07-23 PROCEDURE — 99284 EMERGENCY DEPT VISIT MOD MDM: CPT

## 2021-07-23 PROCEDURE — 71046 X-RAY EXAM CHEST 2 VIEWS: CPT | Mod: 26

## 2021-07-23 PROCEDURE — 99213 OFFICE O/P EST LOW 20 MIN: CPT | Mod: 24

## 2021-07-23 PROCEDURE — 70487 CT MAXILLOFACIAL W/DYE: CPT | Mod: 26

## 2021-07-23 PROCEDURE — 70360 X-RAY EXAM OF NECK: CPT | Mod: 26

## 2021-07-23 RX ORDER — ACETAMINOPHEN 500 MG
975 TABLET ORAL ONCE
Refills: 0 | Status: COMPLETED | OUTPATIENT
Start: 2021-07-23 | End: 2021-07-23

## 2021-07-23 RX ORDER — FAMOTIDINE 10 MG/ML
20 INJECTION INTRAVENOUS ONCE
Refills: 0 | Status: COMPLETED | OUTPATIENT
Start: 2021-07-23 | End: 2021-07-23

## 2021-07-23 RX ADMIN — FAMOTIDINE 20 MILLIGRAM(S): 10 INJECTION INTRAVENOUS at 12:34

## 2021-07-23 RX ADMIN — Medication 30 MILLILITER(S): at 12:35

## 2021-07-23 RX ADMIN — Medication 975 MILLIGRAM(S): at 13:12

## 2021-07-23 RX ADMIN — Medication 975 MILLIGRAM(S): at 12:34

## 2021-07-23 NOTE — ED PROVIDER NOTE - PATIENT PORTAL LINK FT
You can access the FollowMyHealth Patient Portal offered by [facility] by registering at the following website: [email or web address]. By joining [employer]’s FollowMyHealth portal, you will also be able to view your health information using other applications (apps) compatible with our system.

## 2021-07-23 NOTE — CONSULT NOTE ADULT - ASSESSMENT
20F s/p cleft lip revision and turbinate outfracture c/b retained merocel packing.      -removed packing in it's entirety  -continue previously prescrived antibiotics for 2-3 days  -follow up with Dr. Reich next week    discussed with Dr. Reich    [facility]

## 2021-07-23 NOTE — ED PROVIDER NOTE - PROGRESS NOTE DETAILS
Amos: plastics paged. will look at CT scans and evaluate. State they are unsure why there would still be packing material back there given the nature of the procedure done so will investigate further. Amos: plastics at bedside, removed merocel packing that patient was unaware that was back there. already has amox at the pharmacy. Able to take full unlabored breaths. Will DC with f/u and return precautions. Amos: patient with negative but borderline oral temp. we recommeded rectal temp, pt declined and understood f/u and return precautions and verbalized that she will return for further evaluation if she does develop a fever. Pickard: patient signed out ot me pending ct, ct shows packing place, removed by plastics, patient declined rectal temp, provided strict return precautions, instructed to complete course of Augmentin

## 2021-07-23 NOTE — ED PROVIDER NOTE - NS ED ROS FT
Constitutional: (-) fever (-) vomiting  Eyes/ENT: (-) vision changes  Cardiovascular: (-) wheezing  Respiratory: (+) cough  Gastrointestinal: (-) vomiting (-) abdominal pain  : (-) dysuria   Musculoskeletal: (-) back pain  Integumentary: (-) rash, (-) edema  Neurological: (-)loc  Allergic/Immunologic: (-) pruritus  Endocrine: No history of thyroid disease

## 2021-07-23 NOTE — ED PROVIDER NOTE - PMH
Asymmetrical Hearing Loss    Cleft hard palate  with lip as well  Cleft lip    History of frequent ear infections    Language barrier    Mandibular hyperplasia    Maxillary hypoplasia

## 2021-07-23 NOTE — ED PROVIDER NOTE - PHYSICAL EXAMINATION
Vitals: I have reviewed the patients vital signs.   General: well appearing, well nourished, no acute distress  HEENT: uvula w/surigical changes- small punctate puncture jordan with surrounding erythema, erythematous posterior oropharynx without tonsilar swelling or exudates, nasopharynx with clear mucus without rhinorrhea, no dysphonia  Neck: no trismus, no bruit, no stridor  Chest/Lungs: no trauma, symmetric chest rise, lung sounds clear bilaterally, speaking in complete sentences  Heart: Regular rate, regular rhythm, skin well perfused  Neuro: A+Ox3, ambulating without difficulty, non dysarthric speech  Eyes: EOMI, no conjunctival injection  MSK: all limbs at baseline strength, no wasting or atrophy,   Skin: no bleeding, no cyanosis, no jaundice, no new emergent lesions Vitals: I have reviewed the patients vital signs.   General: well appearing, well nourished, no acute distress  HEENT: uvula w/surgical changes- small punctate puncture jordan with surrounding erythema, erythematous posterior oropharynx without tonsilar swelling or exudates, nasopharynx with clear mucus without rhinorrhea, no dysphonia  Neck: no trismus, no bruit, no stridor  Chest/Lungs: no trauma, symmetric chest rise, lung sounds clear bilaterally, speaking in complete sentences  Heart: Regular rate, regular rhythm, skin well perfused  Neuro: A+Ox3, ambulating without difficulty, non dysarthric speech  Eyes: EOMI, no conjunctival injection  MSK: all limbs at baseline strength, no wasting or atrophy,   Skin: no bleeding, no cyanosis, no jaundice, no new emergent lesions. Healing scars from recent cleft lip repair.

## 2021-07-23 NOTE — ED ADULT NURSE NOTE - OBJECTIVE STATEMENT
Pt received to intake room 10B. Pt comes to ED s/p 1 week cleft lip and nasal cartilage reconstruction, c/o a consistent sore throat, odynophagia, foul smell & taste, nausea and non-productive cough starting right after surgery. Went to her PMD & surgeon twice since surgery. Denies difficulty swallowing, talking, breathing. Respirations are even & unlabored. 20 G IV Placed to left AC. Pt is A&Ox4, ambulates independently.

## 2021-07-23 NOTE — ED PROVIDER NOTE - NSFOLLOWUPINSTRUCTIONS_ED_ALL_ED_FT
1.91 You came to the ER with throat pain, foul smells and tastes, stuffy nose. We found an object in the back of your nose and it was removed by our plastic surgery resident. You have antibiotics waiting for you at the pharmacy, please take them as prescribed. You can take motrin and tylenol for the pain as needed.     Please return to the ED for fevers, inability to breathe or swallow, if you cannot move your neck, pass out, or for any concern you would like evaluated.    Follow up with your doctor within a week.

## 2021-07-23 NOTE — ED PROVIDER NOTE - CLINICAL SUMMARY MEDICAL DECISION MAKING FREE TEXT BOX
19 y/o F hx recent surgical recon of cleft lip and nasal cartilage here now with persistent sore throat, odynophagia, cough, foul smell/taste. Been seen by surgeon x2 and PMD, rx nasal spray + tylenol and lozenges without relief. Tolerating PO. No trismus, no stridor, no dysphonia. Lungs CTAB. Well appearing. Oropharynx erythematous without exudate, tonsil with surgical changes, erythema, puncture jordan. Unclear etiology at this time, differential includes fluid collection/abscess, GERD, PNA. Less likely RPA, strep, tonsilith. Will get CT maxface, XR neck and chest, basic labs, tylenol + pepcid, re eval. Likely tbdc +/- abx. 21 y/o F hx recent surgical recon of cleft lip and nasal cartilage here now with persistent sore throat, odynophagia, cough, foul smell/taste, severe nasal congestion. Been seen by surgeon x2 and PMD, rx nasal spray + tylenol and lozenges without relief. Tolerating PO. No trismus, no stridor, no dysphonia. Lungs CTAB. Well appearing. Oropharynx erythematous without exudate, tonsil with surgical changes, erythema, puncture jordan. Unclear etiology at this time, differential includes fluid collection/abscess, GERD, PNA, seasonl allergies - though pt states she's never had nasal congestion prior to this surgery. Less likely RPA, strep, tonsilith. Will get CT maxface, XR neck and chest, basic labs, tylenol + pepcid, re eval. Likely tbdc +/- abx.

## 2021-07-23 NOTE — CONSULT NOTE ADULT - SUBJECTIVE AND OBJECTIVE BOX
20F s/p cleft lip revision and turbinate outfracture on 7/14.  Merocel packing was placed at the conclusion of the procedure and was to be removed in office by the surgeon.  At the first follow-up visit, patient told surgeon that it had fallen out.  Since then, has had persistent sore throat and malodorous breath.  Came to ED today and got a CT scan which was read as having packing material in the R nasal cavity.     PMHx: Asymmetrical Hearing Loss    Cleft hard palate    Language barrier    Maxillary hypoplasia    Mandibular hyperplasia    Cleft lip    History of frequent ear infections in childhood    History of frequent ear infections      PSHx: Ear Problem    Ear Problem    Cleft Lip    Cleft Palate    Cleft Palate    Maxillary hypoplasia      Medications (inpatient):   Medications (PRN):  Allergies: No Known Allergies  (Intolerances: )  Social Hx:     Physical Exam  T(C): 36.7 (07-23-21 @ 14:06)  HR: 70 (07-23-21 @ 14:06) (70 - 85)  BP: 119/78 (07-23-21 @ 14:06) (119/78 - 144/97)  RR: 16 (07-23-21 @ 14:06) (16 - 16)  SpO2: 100% (07-23-21 @ 14:06) (100% - 100%)  Wt(kg): --  Tmax: T(C): , Max: 36.8 (07-23-21 @ 10:47)  Wt(kg): --    General: well developed, well nourished, NAD  HEENT: malodorous breath, nasal speculum exam with visible black suture material deep in the nasal cavity - grasped with forcep and removed an intact merocel packing strip.    Labs:                        13.5   7.51  )-----------( 256      ( 23 Jul 2021 13:14 )             43.4       07-23    139  |  103  |  12  ----------------------------<  88  4.6   |  23  |  0.56    Ca    9.9      23 Jul 2021 13:14    TPro  7.3  /  Alb  4.5  /  TBili  0.4  /  DBili  x   /  AST  18  /  ALT  15  /  AlkPhos  109  07-23            Imaging and other studies:

## 2021-07-23 NOTE — ED PROVIDER NOTE - ATTENDING CONTRIBUTION TO CARE
Attending Attestation: Dr. Good  I have personally performed a history and physical examination of the patient and discussed management with the resident as well as the patient.  I reviewed the resident's note and agree with the documented findings and plan of care.  I have authored and modified critical sections of the Provider Note, including but not limited to HPI, Physical Exam and MDM. 19 y/o F hx recent surgical recon of cleft lip and nasal cartilage here now with persistent sore throat, odynophagia, cough, foul smell/taste, severe nasal congestion. Been seen by surgeon x2 and PMD, rx nasal spray + tylenol and lozenges without relief. Tolerating PO. No trismus, no stridor, no dysphonia. Lungs CTAB. Well appearing. Oropharynx erythematous without exudate, tonsil with surgical changes, erythema, puncture jordan. Unclear etiology at this time, differential includes fluid collection/abscess, GERD, PNA, seasonl allergies - though pt states she's never had nasal congestion prior to this surgery. Less likely RPA, strep, tonsilith. Will get CT maxface, XR neck and chest, basic labs, tylenol + pepcid, re eval. Likely tbdc +/- abx.

## 2021-07-23 NOTE — ED PROVIDER NOTE - PSH
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   Cleft lip/palate repair with Dr. Elsy Osman. no complications.  2 palate surgeries with Dr. Bailey, most recent at 9 years of age.   Nasal surgery in [place] ([facility]) 11/2016  Ear Problem  [facility].   Left ear perforated ear drum repair at 6 years of age.   no complications.  Maxillary hypoplasia  s/p Lefort procedure in 6/2016.

## 2021-07-23 NOTE — ED ADULT TRIAGE NOTE - CHIEF COMPLAINT QUOTE
Pt c/o of congestion, throat discomfort and difficulty breathing s/p surgery on Tuesday for cleft lip. Denies fever/chills. Respirations even/unlabored, able to speak in full and complete sentences in triage, appears comfortable.

## 2021-07-23 NOTE — ED PROVIDER NOTE - OBJECTIVE STATEMENT
19 y/o F no stated PMH here 1 wk post cleft lip and nasal cartilage reconstruction. Since then has had persistent sore throat, odynophagia, foul smell and taste, nausea, non productive cough. Has tried tylenol, azelastine spray, salt water rinse, lozenges without relief. No fevers, no neck pain with movement, no cp, abd pain, n/v. Has been to surgeons office 2x as well as her PMD since then. 21 y/o F c h/o cleft palate/lip, here 1 wk post cleft lip and nasal cartilage reconstruction. Since then has had persistent sore throat, odynophagia, foul smelling oral dc and taste, nausea, non productive cough. Has tried tylenol, azelastine spray, salt water rinse, lozenges without relief. No fevers, no neck pain with movement, no cp, abd pain, n/v. Has been to surgeons office 2x as well as her PMD since then but has not had any imaging. NO facial swelling.

## 2021-07-27 NOTE — HISTORY OF PRESENT ILLNESS
[FreeTextEntry1] : DOP - 7/14/21 S/P Cleft Lip Revision  and nasal surgery to relieve nasal obstruction with out fracturing of nasal turbinates\par Patient reporting throat pain and congestion and postnasal drip over the past 9 days.  Patient reports that symptoms have not been improving. + ANGELES. denies tooth pain. Seen by PCP who agreed that no apparent bacterial pharyngitis. Denies cough. + malodor of breath.  Patient feels that there is something stuck in the back of her throat and she is unable to clear mucus and congestion from her throat. Pt seen at last visit by Dr Reich  reporting that packing had fallen out \par no current fever but had fever the other day.

## 2021-08-05 ENCOUNTER — APPOINTMENT (OUTPATIENT)
Dept: PLASTIC SURGERY | Facility: CLINIC | Age: 21
End: 2021-08-05

## 2021-08-06 ENCOUNTER — APPOINTMENT (OUTPATIENT)
Dept: OTOLARYNGOLOGY | Facility: CLINIC | Age: 21
End: 2021-08-06
Payer: COMMERCIAL

## 2021-08-06 PROCEDURE — 99214 OFFICE O/P EST MOD 30 MIN: CPT | Mod: 25

## 2021-08-06 PROCEDURE — 69210 REMOVE IMPACTED EAR WAX UNI: CPT

## 2021-08-06 PROCEDURE — 31231 NASAL ENDOSCOPY DX: CPT

## 2021-08-06 RX ORDER — AMOXICILLIN AND CLAVULANATE POTASSIUM 875; 125 MG/1; MG/1
875-125 TABLET, COATED ORAL TWICE DAILY
Qty: 14 | Refills: 0 | Status: DISCONTINUED | COMMUNITY
Start: 2021-07-23 | End: 2021-08-06

## 2021-08-06 RX ORDER — NITROFURANTOIN (MONOHYDRATE/MACROCRYSTALS) 25; 75 MG/1; MG/1
100 CAPSULE ORAL TWICE DAILY
Qty: 14 | Refills: 0 | Status: DISCONTINUED | COMMUNITY
Start: 2021-05-07 | End: 2021-08-06

## 2021-08-06 RX ORDER — CEPHALEXIN 500 MG/1
500 TABLET ORAL
Qty: 20 | Refills: 0 | Status: DISCONTINUED | COMMUNITY
Start: 2021-03-26 | End: 2021-08-06

## 2021-08-06 RX ORDER — FLUCONAZOLE 150 MG/1
150 TABLET ORAL
Qty: 1 | Refills: 0 | Status: DISCONTINUED | COMMUNITY
Start: 2021-05-07 | End: 2021-08-06

## 2021-08-06 NOTE — PHYSICAL EXAM
[Normal] : the left external ear was normal [FreeTextEntry8] : n [de-identified] : normal ear drum, dry, minimal cerumen build up [de-identified] : normal, more cerumen build up, narrow canal

## 2021-08-06 NOTE — PROCEDURE
[FreeTextEntry3] : Procedure note:  Bilateral cerumenectomy\par \par Description of Procedure:  Cerumen impaction was noted requiring debridement under the operating microscope using otologic instrumentation.  This procedure was repeated in the contralateral ear.  The patient tolerated the procedure without complications. [FreeTextEntry6] : Procedure note: Nasal endoscopy\par \par Description of Procedure:  Nasal endoscopy was performed because of recalcitrant symptoms of nasal obstruction and/or chronic rhinitis, and anterior anatomic abnormalities precluding visualization.  Using a flexible endoscope with sheath, the nasal mucosa, septum, turbinates, and ostiomeatal complex were examined.  \par \par Nasal mucosa findings:  the nasal mucosa was mildly edematous.  There was crusting involving the inferior nasal cavity on the right side with some thickened mucus drainage\par Septum findings:  the septum showed no abnormalities.\par Nasopharynx findings:  the patient could not tolerate examination of the nasopharynx.\par Middle meatus findings:  the middle meatus had no abnormalities.\par Sinuses findings:  the paranasal sinuses had no abnormalities.\par

## 2021-08-06 NOTE — HISTORY OF PRESENT ILLNESS
[de-identified] : 20 year old female follow up for clogged ears hx Left OE, ear canal stenosis and left TM perforation \par States hearing is stable since the last visit. Reports dizziness is getting worse, occurs more frequently,  described as feeling off balanced, lasts for up to 6 hours. Denies otalgia, otorrhea, tinnitus.

## 2021-08-10 ENCOUNTER — APPOINTMENT (OUTPATIENT)
Dept: ULTRASOUND IMAGING | Facility: CLINIC | Age: 21
End: 2021-08-10
Payer: COMMERCIAL

## 2021-08-10 ENCOUNTER — OUTPATIENT (OUTPATIENT)
Dept: OUTPATIENT SERVICES | Facility: HOSPITAL | Age: 21
LOS: 1 days | End: 2021-08-10
Payer: COMMERCIAL

## 2021-08-10 DIAGNOSIS — N83.209 UNSPECIFIED OVARIAN CYST, UNSPECIFIED SIDE: ICD-10-CM

## 2021-08-10 DIAGNOSIS — Z00.8 ENCOUNTER FOR OTHER GENERAL EXAMINATION: ICD-10-CM

## 2021-08-10 DIAGNOSIS — M26.02 MAXILLARY HYPOPLASIA: Chronic | ICD-10-CM

## 2021-08-10 PROCEDURE — 76856 US EXAM PELVIC COMPLETE: CPT

## 2021-08-10 PROCEDURE — 76856 US EXAM PELVIC COMPLETE: CPT | Mod: 26

## 2021-08-17 ENCOUNTER — APPOINTMENT (OUTPATIENT)
Dept: PLASTIC SURGERY | Facility: CLINIC | Age: 21
End: 2021-08-17

## 2021-08-18 ENCOUNTER — APPOINTMENT (OUTPATIENT)
Dept: INTERNAL MEDICINE | Facility: CLINIC | Age: 21
End: 2021-08-18
Payer: COMMERCIAL

## 2021-08-18 ENCOUNTER — APPOINTMENT (OUTPATIENT)
Dept: ULTRASOUND IMAGING | Facility: CLINIC | Age: 21
End: 2021-08-18
Payer: COMMERCIAL

## 2021-08-18 ENCOUNTER — OUTPATIENT (OUTPATIENT)
Dept: OUTPATIENT SERVICES | Facility: HOSPITAL | Age: 21
LOS: 1 days | End: 2021-08-18
Payer: COMMERCIAL

## 2021-08-18 VITALS
BODY MASS INDEX: 30.32 KG/M2 | TEMPERATURE: 97.9 F | HEIGHT: 65 IN | SYSTOLIC BLOOD PRESSURE: 120 MMHG | HEART RATE: 85 BPM | OXYGEN SATURATION: 99 % | DIASTOLIC BLOOD PRESSURE: 80 MMHG | WEIGHT: 182 LBS

## 2021-08-18 DIAGNOSIS — M26.02 MAXILLARY HYPOPLASIA: Chronic | ICD-10-CM

## 2021-08-18 DIAGNOSIS — R10.9 UNSPECIFIED ABDOMINAL PAIN: ICD-10-CM

## 2021-08-18 DIAGNOSIS — C54.1 MALIGNANT NEOPLASM OF ENDOMETRIUM: ICD-10-CM

## 2021-08-18 PROCEDURE — 76700 US EXAM ABDOM COMPLETE: CPT | Mod: 26

## 2021-08-18 PROCEDURE — 76700 US EXAM ABDOM COMPLETE: CPT

## 2021-08-18 PROCEDURE — 99213 OFFICE O/P EST LOW 20 MIN: CPT | Mod: 25

## 2021-08-18 PROCEDURE — 36415 COLL VENOUS BLD VENIPUNCTURE: CPT

## 2021-08-18 RX ORDER — AMOXICILLIN AND CLAVULANATE POTASSIUM 500; 125 MG/1; MG/1
500-125 TABLET, FILM COATED ORAL TWICE DAILY
Qty: 20 | Refills: 0 | Status: DISCONTINUED | COMMUNITY
Start: 2021-08-06 | End: 2021-08-18

## 2021-08-19 LAB
ALBUMIN SERPL ELPH-MCNC: 4.6 G/DL
ALP BLD-CCNC: 105 U/L
ALT SERPL-CCNC: 10 U/L
ANION GAP SERPL CALC-SCNC: 10 MMOL/L
AST SERPL-CCNC: 17 U/L
BASOPHILS # BLD AUTO: 0.03 K/UL
BASOPHILS NFR BLD AUTO: 0.5 %
BILIRUB SERPL-MCNC: 0.4 MG/DL
BUN SERPL-MCNC: 14 MG/DL
CALCIUM SERPL-MCNC: 9.6 MG/DL
CHLORIDE SERPL-SCNC: 104 MMOL/L
CO2 SERPL-SCNC: 23 MMOL/L
CREAT SERPL-MCNC: 0.5 MG/DL
EOSINOPHIL # BLD AUTO: 0.04 K/UL
EOSINOPHIL NFR BLD AUTO: 0.6 %
ERYTHROCYTE [SEDIMENTATION RATE] IN BLOOD BY WESTERGREN METHOD: 22 MM/HR
GLUCOSE SERPL-MCNC: 94 MG/DL
HCT VFR BLD CALC: 41.9 %
HGB BLD-MCNC: 12.9 G/DL
IMM GRANULOCYTES NFR BLD AUTO: 0.3 %
LYMPHOCYTES # BLD AUTO: 1.3 K/UL
LYMPHOCYTES NFR BLD AUTO: 19.6 %
MAN DIFF?: NORMAL
MCHC RBC-ENTMCNC: 27.3 PG
MCHC RBC-ENTMCNC: 30.8 GM/DL
MCV RBC AUTO: 88.6 FL
MONOCYTES # BLD AUTO: 0.39 K/UL
MONOCYTES NFR BLD AUTO: 5.9 %
NEUTROPHILS # BLD AUTO: 4.86 K/UL
NEUTROPHILS NFR BLD AUTO: 73.1 %
PLATELET # BLD AUTO: 250 K/UL
POTASSIUM SERPL-SCNC: 4.2 MMOL/L
PROT SERPL-MCNC: 7.2 G/DL
RBC # BLD: 4.73 M/UL
RBC # FLD: 12.6 %
SODIUM SERPL-SCNC: 137 MMOL/L
WBC # FLD AUTO: 6.64 K/UL

## 2021-09-10 ENCOUNTER — APPOINTMENT (OUTPATIENT)
Dept: OTOLARYNGOLOGY | Facility: CLINIC | Age: 21
End: 2021-09-10

## 2021-11-06 ENCOUNTER — TRANSCRIPTION ENCOUNTER (OUTPATIENT)
Age: 21
End: 2021-11-06

## 2021-11-21 ENCOUNTER — TRANSCRIPTION ENCOUNTER (OUTPATIENT)
Age: 21
End: 2021-11-21

## 2021-12-03 ENCOUNTER — APPOINTMENT (OUTPATIENT)
Dept: OTOLARYNGOLOGY | Facility: CLINIC | Age: 21
End: 2021-12-03
Payer: COMMERCIAL

## 2021-12-03 VITALS — BODY MASS INDEX: 29.99 KG/M2 | HEIGHT: 65 IN | WEIGHT: 180 LBS

## 2021-12-03 PROCEDURE — 92557 COMPREHENSIVE HEARING TEST: CPT

## 2021-12-03 PROCEDURE — 92567 TYMPANOMETRY: CPT

## 2021-12-03 PROCEDURE — 99213 OFFICE O/P EST LOW 20 MIN: CPT | Mod: 25

## 2021-12-03 PROCEDURE — G0268 REMOVAL OF IMPACTED WAX MD: CPT

## 2021-12-03 RX ORDER — MECLIZINE HYDROCHLORIDE 12.5 MG/1
12.5 TABLET ORAL 3 TIMES DAILY
Qty: 30 | Refills: 0 | Status: DISCONTINUED | COMMUNITY
Start: 2021-06-15 | End: 2021-12-03

## 2021-12-03 RX ORDER — AZELASTINE HYDROCHLORIDE 205.5 UG/1
0.15 SPRAY, METERED NASAL
Qty: 1 | Refills: 0 | Status: DISCONTINUED | COMMUNITY
Start: 2021-07-21 | End: 2021-12-03

## 2021-12-03 RX ORDER — SODIUM CHLORIDE 0.65 %
0.65 AEROSOL, SPRAY (ML) NASAL TWICE DAILY
Qty: 1 | Refills: 3 | Status: DISCONTINUED | COMMUNITY
Start: 2021-08-06 | End: 2021-12-03

## 2021-12-03 RX ORDER — FLUTICASONE PROPIONATE 50 UG/1
50 SPRAY, METERED NASAL DAILY
Qty: 1 | Refills: 2 | Status: DISCONTINUED | COMMUNITY
Start: 2021-07-21 | End: 2021-12-03

## 2021-12-03 RX ORDER — FAMOTIDINE 20 MG/1
20 TABLET, FILM COATED ORAL
Qty: 180 | Refills: 0 | Status: DISCONTINUED | COMMUNITY
Start: 2021-03-18 | End: 2021-12-03

## 2021-12-04 NOTE — HISTORY OF PRESENT ILLNESS
[de-identified] : 21 year old female follow up for clogged ears hx left OE, ear cameron stenosis and left TM perforation. Reports hearing is stable since the last visit. Reports ears felt clogged about 2-3 weeks ago, went to urgent care and ears were cleaned with relief.  Reports intermittent bilateral tinnitus, non fluctuating. Denies otalgia, otorrhea, dizziness.

## 2021-12-10 ENCOUNTER — APPOINTMENT (OUTPATIENT)
Dept: OTOLARYNGOLOGY | Facility: CLINIC | Age: 21
End: 2021-12-10

## 2021-12-10 ENCOUNTER — APPOINTMENT (OUTPATIENT)
Dept: OBGYN | Facility: CLINIC | Age: 21
End: 2021-12-10

## 2021-12-16 ENCOUNTER — NON-APPOINTMENT (OUTPATIENT)
Age: 21
End: 2021-12-16

## 2021-12-17 ENCOUNTER — APPOINTMENT (OUTPATIENT)
Dept: OBGYN | Facility: CLINIC | Age: 21
End: 2021-12-17
Payer: COMMERCIAL

## 2021-12-17 VITALS
HEIGHT: 65 IN | SYSTOLIC BLOOD PRESSURE: 112 MMHG | DIASTOLIC BLOOD PRESSURE: 74 MMHG | WEIGHT: 184 LBS | BODY MASS INDEX: 30.66 KG/M2

## 2021-12-17 DIAGNOSIS — Z01.419 ENCOUNTER FOR GYNECOLOGICAL EXAMINATION (GENERAL) (ROUTINE) W/OUT ABNORMAL FINDINGS: ICD-10-CM

## 2021-12-17 PROCEDURE — 99395 PREV VISIT EST AGE 18-39: CPT

## 2021-12-17 NOTE — PHYSICAL EXAM
[Labia Majora] : normal [Labia Minora] : normal [Normal] : normal [Uterine Adnexae] : normal [FreeTextEntry1] : +white layer, + white discharge, hymen intact

## 2021-12-17 NOTE — DISCUSSION/SUMMARY
[FreeTextEntry1] : 22 y/o P0 LMP regular\par Pelvic us for f/u ovarian cyst\par Vaginal discharge, vulvar irritation, affirm at the introitus\par Rx diflucan and lotrisone\par Perineal care discussed\par Patient in a hurry and couldn't leave urine secimen (obtain GC/Chl next visit)\par f/u1 year

## 2021-12-17 NOTE — HISTORY OF PRESENT ILLNESS
[FreeTextEntry1] : 20 y/o P0 LMP regular\par not sexually active\par here for annnual exam\par Hx of ovarian cyst, not currently in pain\par +Vular irritation and discharge \par UTD with Gardasil vaccine and COVID vaccine (+booster)

## 2021-12-20 ENCOUNTER — APPOINTMENT (OUTPATIENT)
Dept: ORTHOPEDIC SURGERY | Facility: CLINIC | Age: 21
End: 2021-12-20
Payer: COMMERCIAL

## 2021-12-20 VITALS — BODY MASS INDEX: 30.66 KG/M2 | HEIGHT: 65 IN | WEIGHT: 184 LBS

## 2021-12-20 PROCEDURE — 73140 X-RAY EXAM OF FINGER(S): CPT | Mod: F1

## 2021-12-20 PROCEDURE — 73110 X-RAY EXAM OF WRIST: CPT | Mod: RT

## 2021-12-20 PROCEDURE — 99203 OFFICE O/P NEW LOW 30 MIN: CPT

## 2021-12-21 NOTE — ADDENDUM
[FreeTextEntry1] : I, Monika Meeks wrote this note acting as a scribe for Dr. Jeremy Meraz on Dec 20, 2021.

## 2021-12-21 NOTE — HISTORY OF PRESENT ILLNESS
[Right] : right hand dominant [FreeTextEntry1] : Patient is a 21 year old female who presents with complaints of left index finger and right wrist pain x several weeks. She denies injury to both the left index and right wrist. She states the right wrist cracks but it is not painful. She has no other joint pain at this time.

## 2021-12-21 NOTE — PHYSICAL EXAM
[de-identified] : Patient is WDWN, alert, and in no acute distress. Breathing is unlabored. She is grossly oriented to person, place, and time.\par \par Left Hand:\par  [de-identified] : AP, lateral and oblique views of the left index finger were obtained today and revealed no abnormalities. No acute fracture. No dislocation. Cartilage spaces are maintained. \par \par AP, lateral and oblique views of the right wrist were obtained today and revealed no abnormalities. No acute fracture. No dislocation. Cartilage spaces are maintained.

## 2021-12-21 NOTE — END OF VISIT
[FreeTextEntry3] : All medical record entries made by the Scribe were at my,  Dr. Jeremy Meraz MD., direction and personally dictated by me on 12/20/2021. I have personally reviewed the chart and agree that the record accurately reflects my personal performance of the history, physical exam, assessment and plan.

## 2021-12-21 NOTE — DISCUSSION/SUMMARY
[FreeTextEntry1] : The underlying pathophysiology was reviewed with the patient. XR films were reviewed with the patient. Discussed at length the nature of the patient’s condition.\par \par She was reassured that her xrays are perfectly normal. Additionally as her symptoms have become more mild with time, I have recommend observation. She has agreed to proceed with observation. \par \par All questions answered, understanding verbalized. Patient in agreement with plan of care. Follow up as needed.

## 2021-12-30 ENCOUNTER — OUTPATIENT (OUTPATIENT)
Dept: OUTPATIENT SERVICES | Facility: HOSPITAL | Age: 21
LOS: 1 days | End: 2021-12-30

## 2021-12-30 VITALS
TEMPERATURE: 98 F | OXYGEN SATURATION: 98 % | DIASTOLIC BLOOD PRESSURE: 72 MMHG | WEIGHT: 186.07 LBS | SYSTOLIC BLOOD PRESSURE: 127 MMHG | RESPIRATION RATE: 16 BRPM | HEART RATE: 77 BPM | HEIGHT: 65 IN

## 2021-12-30 DIAGNOSIS — H72.02 CENTRAL PERFORATION OF TYMPANIC MEMBRANE, LEFT EAR: ICD-10-CM

## 2021-12-30 DIAGNOSIS — M26.02 MAXILLARY HYPOPLASIA: Chronic | ICD-10-CM

## 2021-12-30 DIAGNOSIS — Z86.69 PERSONAL HISTORY OF OTHER DISEASES OF THE NERVOUS SYSTEM AND SENSE ORGANS: ICD-10-CM

## 2021-12-30 LAB
ANION GAP SERPL CALC-SCNC: 11 MMOL/L — SIGNIFICANT CHANGE UP (ref 7–14)
BUN SERPL-MCNC: 16 MG/DL — SIGNIFICANT CHANGE UP (ref 7–23)
CALCIUM SERPL-MCNC: 9.8 MG/DL — SIGNIFICANT CHANGE UP (ref 8.4–10.5)
CHLORIDE SERPL-SCNC: 102 MMOL/L — SIGNIFICANT CHANGE UP (ref 98–107)
CO2 SERPL-SCNC: 26 MMOL/L — SIGNIFICANT CHANGE UP (ref 22–31)
CREAT SERPL-MCNC: 0.67 MG/DL — SIGNIFICANT CHANGE UP (ref 0.5–1.3)
GLUCOSE SERPL-MCNC: 82 MG/DL — SIGNIFICANT CHANGE UP (ref 70–99)
HCG UR QL: NEGATIVE — SIGNIFICANT CHANGE UP
HCT VFR BLD CALC: 39.6 % — SIGNIFICANT CHANGE UP (ref 34.5–45)
HGB BLD-MCNC: 13 G/DL — SIGNIFICANT CHANGE UP (ref 11.5–15.5)
MCHC RBC-ENTMCNC: 27.8 PG — SIGNIFICANT CHANGE UP (ref 27–34)
MCHC RBC-ENTMCNC: 32.8 GM/DL — SIGNIFICANT CHANGE UP (ref 32–36)
MCV RBC AUTO: 84.8 FL — SIGNIFICANT CHANGE UP (ref 80–100)
NRBC # BLD: 0 /100 WBCS — SIGNIFICANT CHANGE UP
NRBC # FLD: 0 K/UL — SIGNIFICANT CHANGE UP
PLATELET # BLD AUTO: 294 K/UL — SIGNIFICANT CHANGE UP (ref 150–400)
POTASSIUM SERPL-MCNC: 3.7 MMOL/L — SIGNIFICANT CHANGE UP (ref 3.5–5.3)
POTASSIUM SERPL-SCNC: 3.7 MMOL/L — SIGNIFICANT CHANGE UP (ref 3.5–5.3)
RBC # BLD: 4.67 M/UL — SIGNIFICANT CHANGE UP (ref 3.8–5.2)
RBC # FLD: 13 % — SIGNIFICANT CHANGE UP (ref 10.3–14.5)
SODIUM SERPL-SCNC: 139 MMOL/L — SIGNIFICANT CHANGE UP (ref 135–145)
WBC # BLD: 8.45 K/UL — SIGNIFICANT CHANGE UP (ref 3.8–10.5)
WBC # FLD AUTO: 8.45 K/UL — SIGNIFICANT CHANGE UP (ref 3.8–10.5)

## 2021-12-30 RX ORDER — SODIUM CHLORIDE 9 MG/ML
1000 INJECTION, SOLUTION INTRAVENOUS
Refills: 0 | Status: DISCONTINUED | OUTPATIENT
Start: 2022-01-04 | End: 2022-01-19

## 2021-12-30 NOTE — H&P PST ADULT - HISTORY OF PRESENT ILLNESS
22 y/o female with hx of cleft lip and palpate at birth.  Hx of repair in infancy and several subsequent revisions, and then pt had Cleft lip revision, outfracture left turbinate in Jul 2021. Pt with h/o  chronic ear infection and [place] left ear presents for preop to have tympanoplasty, meatoplasty, fat/fascia graft.

## 2021-12-30 NOTE — H&P PST ADULT - PROBLEM SELECTOR PLAN 1
Pt scheduled for tympanoplasty, meatoplasty, fat/fascia graft.  labs pending,   Preop instructions, famotidine,  provided to pt with instructions.  pt had covid pcr today 12/30/21 Pt scheduled for tympanoplasty, meatoplasty, fat/fascia graft on 1/4/22  labs pending,   Preop instructions, famotidine,  provided to pt with instructions.  pt had covid pcr today 12/30/21

## 2021-12-30 NOTE — H&P PST ADULT - NSANTHOSAYNRD_GEN_A_CORE
never tested/No. IGGY screening performed.  STOP BANG Legend: 0-2 = LOW Risk; 3-4 = INTERMEDIATE Risk; 5-8 = HIGH Risk

## 2021-12-30 NOTE — H&P PST ADULT - ASSESSMENT
20 y/o female with hx of cleft lip and palpate at birth.  Hx of repair in infancy and several subsequent revisions, and then pt had Cleft lip revision, outfracture left turbinate in Jul 2021. Pt with h/o  chronic ear infection and [place] left ear presents for preop to have tympanoplasty, meatoplasty, fat/fascia graft.   20 y/o female with hx of cleft lip and palpate at birth.  Hx of repair in infancy and several subsequent revisions, and then pt had Cleft lip revision, outfracture left turbinate in Jul 2021. Pt with h/o  chronic ear infection and [place] left ear presents for preop to have tympanoplasty, meatoplasty, fat/fascia graft on 1/4/22.

## 2021-12-30 NOTE — H&P PST ADULT - BSA (M2)
RE:  Cancel Procedure    Please be advised that patient states he forgot about the COVID appt. Patient informed we will need to cancel the procedure and reschedule.  Patient verbalized understanding, Dr. Jang office number provided to patient who agrees to call the office and advise he had forgotten about the COVID appt and will need to reschedule.    Unique c/o Dr Jang office notified patient forgot his COVID and will need to reschedule appt.  Unique stated she will send the Cancel Order.  
1.92

## 2021-12-30 NOTE — H&P PST ADULT - NSICDXPASTMEDICALHX_GEN_ALL_CORE_FT
PAST MEDICAL HISTORY:  Asymmetrical Hearing Loss     Cleft hard palate with lip as well    Cleft lip     History of frequent ear infections     Language barrier     Mandibular hyperplasia     Maxillary hypoplasia

## 2021-12-30 NOTE — H&P PST ADULT - ENT GEN HX ROS MEA POS PC
Pt reports bilateral ears feel clogged since yesterday.  Pt has appt with  PMD  on 7/12/21 or evalautlion/hearing difficulty/ear pain

## 2021-12-30 NOTE — H&P PST ADULT - ENMT COMMENTS
bilateral TM's dull, no erythema, no drainage.  Cleft palate repair hx of cleft lip and palpate at birth.  Hx of repair in infancy and several subsequent revisions. Pt reports this surgery will be her last revision.  Per pt, she also has difficulty breathing through left nostril.   Scheduled for Cleft lip revision, outfracture left turbinate

## 2021-12-30 NOTE — H&P PST ADULT - NSICDXPASTSURGICALHX_GEN_ALL_CORE_FT
PAST SURGICAL HISTORY:  Cleft Palate 3mnths, 6mnths and [age] year of age.   Cleft lip/palate repair with Dr. Elsy Osman. no complications.  2 palate surgeries with Dr. Bailey, most recent at 9 years of age.   Nasal surgery in [place] ([facility]) 11/2016    Ear Problem [facility].   Left ear perforated ear drum repair at 6 years of age.   no complications.    Maxillary hypoplasia s/p Lefort procedure in 6/2016.

## 2021-12-30 NOTE — H&P PST ADULT - VENOUS THROMBOEMBOLISM BMI
(1) obesity (BMI greater than 25) Erivedge Counseling- I discussed with the patient the risks of Erivedge including but not limited to nausea, vomiting, diarrhea, constipation, weight loss, changes in the sense of taste, decreased appetite, muscle spasms, and hair loss.  The patient verbalized understanding of the proper use and possible adverse effects of Erivedge.  All of the patient's questions and concerns were addressed.

## 2021-12-30 NOTE — H&P PST ADULT - ABILITY TO HEAR (WITH HEARING AID OR HEARING APPLIANCE IF NORMALLY USED):
does not use hearing aids/Mildly to Moderately Impaired: difficulty hearing in some environments or speaker may need to increase volume or speak distinctly

## 2022-01-03 ENCOUNTER — TRANSCRIPTION ENCOUNTER (OUTPATIENT)
Age: 22
End: 2022-01-03

## 2022-01-03 VITALS
DIASTOLIC BLOOD PRESSURE: 87 MMHG | TEMPERATURE: 98 F | OXYGEN SATURATION: 100 % | SYSTOLIC BLOOD PRESSURE: 125 MMHG | WEIGHT: 186.07 LBS | RESPIRATION RATE: 18 BRPM | HEIGHT: 65 IN | HEART RATE: 65 BPM

## 2022-01-03 NOTE — ASU PREOPERATIVE ASSESSMENT, ADULT (IPARK ONLY) - FALL HARM RISK - UNIVERSAL INTERVENTIONS
Bed in lowest position, wheels locked, appropriate side rails in place/Call bell, personal items and telephone in reach/Instruct patient to call for assistance before getting out of bed or chair/Non-slip footwear when patient is out of bed/[place] to call system/Physically safe environment - no spills, clutter or unnecessary equipment/Purposeful Proactive Rounding/Room/bathroom lighting operational, light cord in reach

## 2022-01-04 ENCOUNTER — APPOINTMENT (OUTPATIENT)
Dept: OTOLARYNGOLOGY | Facility: AMBULATORY SURGERY CENTER | Age: 22
End: 2022-01-04

## 2022-01-04 ENCOUNTER — OUTPATIENT (OUTPATIENT)
Dept: OUTPATIENT SERVICES | Facility: HOSPITAL | Age: 22
LOS: 1 days | Discharge: HOME | End: 2022-01-04
Payer: COMMERCIAL

## 2022-01-04 VITALS
DIASTOLIC BLOOD PRESSURE: 73 MMHG | RESPIRATION RATE: 16 BRPM | TEMPERATURE: 98 F | HEART RATE: 80 BPM | SYSTOLIC BLOOD PRESSURE: 118 MMHG | OXYGEN SATURATION: 98 %

## 2022-01-04 DIAGNOSIS — H72.02 CENTRAL PERFORATION OF TYMPANIC MEMBRANE, LEFT EAR: ICD-10-CM

## 2022-01-04 DIAGNOSIS — M26.02 MAXILLARY HYPOPLASIA: Chronic | ICD-10-CM

## 2022-01-04 PROCEDURE — 69631 REPAIR EARDRUM STRUCTURES: CPT | Mod: LT

## 2022-01-04 PROCEDURE — 69210 REMOVE IMPACTED EAR WAX UNI: CPT | Mod: RT

## 2022-01-04 DEVICE — SURGIFOAM PAD 8CM X 12.5CM X 2MM (100C): Type: IMPLANTABLE DEVICE | Status: FUNCTIONAL

## 2022-01-04 RX ORDER — CEFDINIR 250 MG/5ML
1 POWDER, FOR SUSPENSION ORAL
Qty: 20 | Refills: 0
Start: 2022-01-04

## 2022-01-04 RX ORDER — OXYCODONE HYDROCHLORIDE 5 MG/1
1 TABLET ORAL
Qty: 10 | Refills: 0
Start: 2022-01-04

## 2022-01-04 RX ORDER — OFLOXACIN 200 MG
5 TABLET ORAL
Qty: 5 | Refills: 0
Start: 2022-01-04

## 2022-01-04 NOTE — ASU DISCHARGE PLAN (ADULT/PEDIATRIC) - NS MD DC FALL RISK RISK
For information on Fall & Injury Prevention, visit: [email or web address] OR  [email or web address] OR  https://www.cdc.gov/steadi/patient.html

## 2022-01-04 NOTE — ASU PREOP CHECKLIST - PATIENT PROBLEMS/NEEDS
Problem: Pain:  Goal: Pain level will decrease  Description: Pain level will decrease  Outcome: Ongoing   Pt states pain level is tolerable at this time. Recently medicated with tylenol per request. Education provided on non pharmacological pain measures. Up in chair and repositioned for comfort. Will monitor. Patient expressed no known problems or needs

## 2022-01-04 NOTE — ASU PREOP CHECKLIST - BP NONINVASIVE DIASTOLIC (MM HG)
10-10-18- Patient had appt moved in 2016. DId not see that rescheduled.    Most recent imaging includes 10-10-18 bilateral screen mammogram with 3D at [facility]- showed extremely dense tissue- new focal asymmetries bilateral requires additional imaging with ultrasound- BR0.    We will arrange and see her back after.   
87

## 2022-01-19 ENCOUNTER — APPOINTMENT (OUTPATIENT)
Dept: OTOLARYNGOLOGY | Facility: CLINIC | Age: 22
End: 2022-01-19
Payer: COMMERCIAL

## 2022-01-19 VITALS
SYSTOLIC BLOOD PRESSURE: 132 MMHG | HEIGHT: 65 IN | DIASTOLIC BLOOD PRESSURE: 92 MMHG | HEART RATE: 90 BPM | WEIGHT: 184 LBS | BODY MASS INDEX: 30.66 KG/M2 | TEMPERATURE: 98 F

## 2022-01-19 PROCEDURE — 99024 POSTOP FOLLOW-UP VISIT: CPT

## 2022-01-27 ENCOUNTER — APPOINTMENT (OUTPATIENT)
Dept: OTOLARYNGOLOGY | Facility: CLINIC | Age: 22
End: 2022-01-27
Payer: COMMERCIAL

## 2022-01-27 VITALS
WEIGHT: 180 LBS | SYSTOLIC BLOOD PRESSURE: 138 MMHG | DIASTOLIC BLOOD PRESSURE: 83 MMHG | BODY MASS INDEX: 29.99 KG/M2 | HEART RATE: 90 BPM | HEIGHT: 65 IN

## 2022-01-27 PROCEDURE — 69210 REMOVE IMPACTED EAR WAX UNI: CPT

## 2022-01-27 PROCEDURE — 99214 OFFICE O/P EST MOD 30 MIN: CPT | Mod: 25

## 2022-01-27 PROCEDURE — 31579 LARYNGOSCOPY TELESCOPIC: CPT

## 2022-01-27 RX ORDER — FLUCONAZOLE 150 MG/1
150 TABLET ORAL
Qty: 1 | Refills: 0 | Status: DISCONTINUED | COMMUNITY
Start: 2021-12-17 | End: 2022-01-27

## 2022-01-27 NOTE — HISTORY OF PRESENT ILLNESS
[de-identified] : 21 year old female presents for follow-up for dysphagia. Reports dysphagia with both solids and liquids for the past year. Reports globus sensation and throat clearing. Denies dyspnea, dysphonia, fevers, recent throat infections. Follows with Jeanne for recurrent ear infections. Denies regurgitation or hemoptysis.\par Worst globus after eating and brushing teeth. Tried famotidine for one week.\par Some choking on liquids. \par

## 2022-01-27 NOTE — PHYSICAL EXAM
[Midline] : trachea located in midline position [Laryngoscopy Performed] : laryngoscopy was performed, see procedure section for findings [de-identified] : cleft lip/cleft palate; bifid uvula, short [Normal] : no rashes

## 2022-01-27 NOTE — CONSULT LETTER
[Dear  ___] : Dear  [unfilled], [Consult Letter:] : I had the pleasure of evaluating your patient, [unfilled]. [Please see my note below.] : Please see my note below. [Consult Closing:] : Thank you very much for allowing me to participate in the care of this patient.  If you have any questions, please do not hesitate to contact me. [Sincerely,] : Sincerely, [FreeTextEntry2] : Dr. Dorcas Padilla [FreeTextEntry3] : Surinder Najera MD, PhD\par Chief, Division of Laryngology\par Department of Otolaryngology\par [facility]\par Pediatric Otolaryngology, [facility]\par  of Otolaryngology\par [facility] [employer] [facility]\par \par \par

## 2022-02-02 ENCOUNTER — APPOINTMENT (OUTPATIENT)
Dept: OTOLARYNGOLOGY | Facility: CLINIC | Age: 22
End: 2022-02-02
Payer: COMMERCIAL

## 2022-02-02 VITALS
WEIGHT: 180 LBS | HEART RATE: 85 BPM | HEIGHT: 65 IN | BODY MASS INDEX: 29.99 KG/M2 | SYSTOLIC BLOOD PRESSURE: 115 MMHG | DIASTOLIC BLOOD PRESSURE: 78 MMHG

## 2022-02-02 PROCEDURE — 99024 POSTOP FOLLOW-UP VISIT: CPT

## 2022-02-02 RX ORDER — OFLOXACIN OTIC 3 MG/ML
0.3 SOLUTION AURICULAR (OTIC)
Qty: 1 | Refills: 1 | Status: COMPLETED | COMMUNITY
Start: 2022-01-19 | End: 2022-02-02

## 2022-02-02 NOTE — HISTORY OF PRESENT ILLNESS
[de-identified] : 21 year old female s/p Left bony canalplasty, meatoplasty, use of operative microscope, facial  nerve monitoring, right exam under anesthesia, and cerumen removal 1/4/2022. States having yellow otorrhea from  left, right tinnitus, intermittent dizziness, feels  both ears are stuffy and hearing is not as clear.  Patient stopped otic drops two days ago.Patient denies otalgia, bleeding, fevers, or ear infections.

## 2022-02-02 NOTE — REASON FOR VISIT
[Subsequent Evaluation] : a subsequent evaluation for [FreeTextEntry2] : s/p Left bony canalplasty, meatoplasty, use of operative microscope, facial  nerve monitoring, right exam under anesthesia, and cerumen removal 1/4/2022

## 2022-02-02 NOTE — PHYSICAL EXAM
[Normal] : the left external ear was normal [FreeTextEntry8] : wax accumulation [de-identified] : meatoplasty patent; healing appropriately; mastoid powder applied

## 2022-02-02 NOTE — PROCEDURE
[FreeTextEntry3] : Procedure note:  Bilateral cerumenectomy\par \par Feb 02, 2022 \par \par Description of Procedure:   Bilateral cerumen impactions were noted requiring debridement under the operating microscope using otologic instrumentation.  The patient tolerated the procedure without complications.

## 2022-02-16 LAB
CANDIDA VAG CYTO: NOT DETECTED
G VAGINALIS+PREV SP MTYP VAG QL MICRO: NOT DETECTED
T VAGINALIS VAG QL WET PREP: NOT DETECTED

## 2022-03-01 ENCOUNTER — OUTPATIENT (OUTPATIENT)
Dept: OUTPATIENT SERVICES | Facility: HOSPITAL | Age: 22
LOS: 1 days | Discharge: HOME | End: 2022-03-01

## 2022-03-01 ENCOUNTER — OUTPATIENT (OUTPATIENT)
Dept: OUTPATIENT SERVICES | Facility: HOSPITAL | Age: 22
LOS: 1 days | End: 2022-03-01

## 2022-03-01 ENCOUNTER — APPOINTMENT (OUTPATIENT)
Dept: RADIOLOGY | Facility: HOSPITAL | Age: 22
End: 2022-03-01
Payer: COMMERCIAL

## 2022-03-01 ENCOUNTER — APPOINTMENT (OUTPATIENT)
Dept: SPEECH THERAPY | Facility: HOSPITAL | Age: 22
End: 2022-03-01
Payer: COMMERCIAL

## 2022-03-01 DIAGNOSIS — R13.10 DYSPHAGIA, UNSPECIFIED: ICD-10-CM

## 2022-03-01 DIAGNOSIS — M26.02 MAXILLARY HYPOPLASIA: Chronic | ICD-10-CM

## 2022-03-01 PROCEDURE — 74230 X-RAY XM SWLNG FUNCJ C+: CPT | Mod: 26

## 2022-03-02 ENCOUNTER — APPOINTMENT (OUTPATIENT)
Dept: INTERNAL MEDICINE | Facility: CLINIC | Age: 22
End: 2022-03-02

## 2022-03-04 ENCOUNTER — APPOINTMENT (OUTPATIENT)
Dept: OTOLARYNGOLOGY | Facility: CLINIC | Age: 22
End: 2022-03-04

## 2022-03-07 ENCOUNTER — NON-APPOINTMENT (OUTPATIENT)
Age: 22
End: 2022-03-07

## 2022-03-10 DIAGNOSIS — R13.12 DYSPHAGIA, OROPHARYNGEAL PHASE: ICD-10-CM

## 2022-03-11 ENCOUNTER — APPOINTMENT (OUTPATIENT)
Dept: INTERNAL MEDICINE | Facility: CLINIC | Age: 22
End: 2022-03-11

## 2022-03-14 ENCOUNTER — APPOINTMENT (OUTPATIENT)
Dept: INTERNAL MEDICINE | Facility: CLINIC | Age: 22
End: 2022-03-14
Payer: COMMERCIAL

## 2022-03-14 ENCOUNTER — NON-APPOINTMENT (OUTPATIENT)
Age: 22
End: 2022-03-14

## 2022-03-14 ENCOUNTER — TRANSCRIPTION ENCOUNTER (OUTPATIENT)
Age: 22
End: 2022-03-14

## 2022-03-14 VITALS
SYSTOLIC BLOOD PRESSURE: 122 MMHG | HEART RATE: 74 BPM | DIASTOLIC BLOOD PRESSURE: 70 MMHG | WEIGHT: 190 LBS | HEIGHT: 65 IN | OXYGEN SATURATION: 97 % | TEMPERATURE: 98.1 F | BODY MASS INDEX: 31.65 KG/M2

## 2022-03-14 DIAGNOSIS — N83.209 UNSPECIFIED OVARIAN CYST, UNSPECIFIED SIDE: ICD-10-CM

## 2022-03-14 DIAGNOSIS — N63.20 UNSPECIFIED LUMP IN THE LEFT BREAST, UNSPECIFIED QUADRANT: ICD-10-CM

## 2022-03-14 PROCEDURE — 36415 COLL VENOUS BLD VENIPUNCTURE: CPT

## 2022-03-14 PROCEDURE — 99395 PREV VISIT EST AGE 18-39: CPT | Mod: 25

## 2022-03-14 PROCEDURE — 93000 ELECTROCARDIOGRAM COMPLETE: CPT

## 2022-03-15 LAB
25(OH)D3 SERPL-MCNC: 29.7 NG/ML
ALBUMIN SERPL ELPH-MCNC: 4.4 G/DL
ALP BLD-CCNC: 107 U/L
ALT SERPL-CCNC: 14 U/L
ANION GAP SERPL CALC-SCNC: 12 MMOL/L
AST SERPL-CCNC: 20 U/L
BASOPHILS # BLD AUTO: 0.03 K/UL
BASOPHILS NFR BLD AUTO: 0.5 %
BILIRUB SERPL-MCNC: 0.3 MG/DL
BUN SERPL-MCNC: 12 MG/DL
CALCIUM SERPL-MCNC: 9.5 MG/DL
CHLORIDE SERPL-SCNC: 104 MMOL/L
CHOLEST SERPL-MCNC: 161 MG/DL
CO2 SERPL-SCNC: 23 MMOL/L
CREAT SERPL-MCNC: 0.58 MG/DL
EGFR: 131 ML/MIN/1.73M2
EOSINOPHIL # BLD AUTO: 0.11 K/UL
EOSINOPHIL NFR BLD AUTO: 1.7 %
ESTIMATED AVERAGE GLUCOSE: 114 MG/DL
GLUCOSE SERPL-MCNC: 87 MG/DL
HBA1C MFR BLD HPLC: 5.6 %
HCT VFR BLD CALC: 41.3 %
HDLC SERPL-MCNC: 44 MG/DL
HGB BLD-MCNC: 12.7 G/DL
IMM GRANULOCYTES NFR BLD AUTO: 0.2 %
LDLC SERPL CALC-MCNC: 88 MG/DL
LDLC SERPL DIRECT ASSAY-MCNC: 91 MG/DL
LYMPHOCYTES # BLD AUTO: 1.35 K/UL
LYMPHOCYTES NFR BLD AUTO: 20.3 %
MAN DIFF?: NORMAL
MCHC RBC-ENTMCNC: 27.3 PG
MCHC RBC-ENTMCNC: 30.8 GM/DL
MCV RBC AUTO: 88.6 FL
MONOCYTES # BLD AUTO: 0.34 K/UL
MONOCYTES NFR BLD AUTO: 5.1 %
NEUTROPHILS # BLD AUTO: 4.81 K/UL
NEUTROPHILS NFR BLD AUTO: 72.2 %
NONHDLC SERPL-MCNC: 117 MG/DL
PLATELET # BLD AUTO: 264 K/UL
POTASSIUM SERPL-SCNC: 4.2 MMOL/L
PROT SERPL-MCNC: 7.1 G/DL
RBC # BLD: 4.66 M/UL
RBC # FLD: 13 %
SODIUM SERPL-SCNC: 139 MMOL/L
T4 FREE SERPL-MCNC: 1.3 NG/DL
TRIGL SERPL-MCNC: 144 MG/DL
TSH SERPL-ACNC: 1.33 UIU/ML
VIT B12 SERPL-MCNC: 416 PG/ML
WBC # FLD AUTO: 6.65 K/UL

## 2022-03-16 LAB — SMOOTH MUSCLE AB SER QL IF: NORMAL

## 2022-04-06 ENCOUNTER — RESULT REVIEW (OUTPATIENT)
Age: 22
End: 2022-04-06

## 2022-04-06 ENCOUNTER — APPOINTMENT (OUTPATIENT)
Dept: ULTRASOUND IMAGING | Facility: CLINIC | Age: 22
End: 2022-04-06
Payer: COMMERCIAL

## 2022-04-06 PROCEDURE — 76856 US EXAM PELVIC COMPLETE: CPT

## 2022-04-06 PROCEDURE — 76641 ULTRASOUND BREAST COMPLETE: CPT | Mod: 50

## 2022-04-08 ENCOUNTER — APPOINTMENT (OUTPATIENT)
Dept: OTOLARYNGOLOGY | Facility: CLINIC | Age: 22
End: 2022-04-08
Payer: COMMERCIAL

## 2022-04-08 VITALS
SYSTOLIC BLOOD PRESSURE: 119 MMHG | BODY MASS INDEX: 29.99 KG/M2 | DIASTOLIC BLOOD PRESSURE: 80 MMHG | HEIGHT: 65 IN | HEART RATE: 88 BPM | WEIGHT: 180 LBS

## 2022-04-08 PROCEDURE — 92567 TYMPANOMETRY: CPT

## 2022-04-08 PROCEDURE — G0268 REMOVAL OF IMPACTED WAX MD: CPT

## 2022-04-08 PROCEDURE — 92557 COMPREHENSIVE HEARING TEST: CPT

## 2022-04-08 PROCEDURE — 99214 OFFICE O/P EST MOD 30 MIN: CPT | Mod: 25

## 2022-04-08 PROCEDURE — 99213 OFFICE O/P EST LOW 20 MIN: CPT | Mod: 25

## 2022-04-08 NOTE — REASON FOR VISIT
[Subsequent Evaluation] : a subsequent evaluation for [FreeTextEntry2] :  s/p Left bony canalplasty, meatoplasty, use of operative microscope, facial nerve monitoring, right exam under anesthesia, and cerumen removal 1/4/2022.

## 2022-04-08 NOTE — HISTORY OF PRESENT ILLNESS
[de-identified] : 21 year old female here for follow up. s/p Left bony canalplasty, meatoplasty, use of operative microscope, facial nerve monitoring, right exam under anesthesia, and cerumen removal 1/4/2022. Patient reports hearing is stable since last visit. Patient reports intermittent tinnitus bilaterally-louder in quiet rooms-resolves on its own. Reports sharp pain in the ear that resolves on its own. Reports white otorrhea a few days ago- otorrhea has stopped since then. Reports occasional dizziness. Patient reports she has something stuck in her throat-thinks it might be a tonsil stone. reports slight dysphagia and odynophagia. Patient denies ear infections, hearing loss, dizziness, vertigo, headaches related to hearing.

## 2022-04-12 ENCOUNTER — NON-APPOINTMENT (OUTPATIENT)
Age: 22
End: 2022-04-12

## 2022-04-18 ENCOUNTER — NON-APPOINTMENT (OUTPATIENT)
Age: 22
End: 2022-04-18

## 2022-04-19 ENCOUNTER — TRANSCRIPTION ENCOUNTER (OUTPATIENT)
Age: 22
End: 2022-04-19

## 2022-04-25 ENCOUNTER — RX RENEWAL (OUTPATIENT)
Age: 22
End: 2022-04-25

## 2022-04-25 ENCOUNTER — APPOINTMENT (OUTPATIENT)
Dept: INTERNAL MEDICINE | Facility: CLINIC | Age: 22
End: 2022-04-25
Payer: COMMERCIAL

## 2022-04-25 ENCOUNTER — APPOINTMENT (OUTPATIENT)
Dept: SPEECH THERAPY | Facility: CLINIC | Age: 22
End: 2022-04-25
Payer: COMMERCIAL

## 2022-04-25 VITALS
TEMPERATURE: 98.9 F | HEIGHT: 65 IN | SYSTOLIC BLOOD PRESSURE: 124 MMHG | WEIGHT: 190 LBS | HEART RATE: 97 BPM | DIASTOLIC BLOOD PRESSURE: 80 MMHG | BODY MASS INDEX: 31.65 KG/M2 | OXYGEN SATURATION: 99 %

## 2022-04-25 VITALS
OXYGEN SATURATION: 98 % | TEMPERATURE: 97.9 F | WEIGHT: 190 LBS | HEIGHT: 65 IN | BODY MASS INDEX: 31.65 KG/M2 | SYSTOLIC BLOOD PRESSURE: 124 MMHG | DIASTOLIC BLOOD PRESSURE: 70 MMHG | HEART RATE: 74 BPM

## 2022-04-25 PROCEDURE — 99213 OFFICE O/P EST LOW 20 MIN: CPT

## 2022-04-25 PROCEDURE — 92526 ORAL FUNCTION THERAPY: CPT | Mod: GN

## 2022-05-09 ENCOUNTER — APPOINTMENT (OUTPATIENT)
Dept: SPEECH THERAPY | Facility: CLINIC | Age: 22
End: 2022-05-09

## 2022-05-09 ENCOUNTER — NON-APPOINTMENT (OUTPATIENT)
Age: 22
End: 2022-05-09

## 2022-05-25 ENCOUNTER — APPOINTMENT (OUTPATIENT)
Dept: OTOLARYNGOLOGY | Facility: CLINIC | Age: 22
End: 2022-05-25

## 2022-06-02 ENCOUNTER — APPOINTMENT (OUTPATIENT)
Dept: INTERNAL MEDICINE | Facility: CLINIC | Age: 22
End: 2022-06-02

## 2022-07-04 ENCOUNTER — NON-APPOINTMENT (OUTPATIENT)
Age: 22
End: 2022-07-04

## 2022-07-25 ENCOUNTER — APPOINTMENT (OUTPATIENT)
Dept: INTERNAL MEDICINE | Facility: CLINIC | Age: 22
End: 2022-07-25

## 2022-07-25 VITALS
BODY MASS INDEX: 31.49 KG/M2 | TEMPERATURE: 98.1 F | WEIGHT: 189 LBS | HEIGHT: 65 IN | DIASTOLIC BLOOD PRESSURE: 80 MMHG | HEART RATE: 74 BPM | SYSTOLIC BLOOD PRESSURE: 132 MMHG | OXYGEN SATURATION: 98 %

## 2022-07-25 PROCEDURE — 36415 COLL VENOUS BLD VENIPUNCTURE: CPT

## 2022-07-25 PROCEDURE — 99212 OFFICE O/P EST SF 10 MIN: CPT | Mod: 25

## 2022-07-26 LAB
ALBUMIN SERPL ELPH-MCNC: 4.8 G/DL
ALP BLD-CCNC: 131 U/L
ALT SERPL-CCNC: 23 U/L
ANION GAP SERPL CALC-SCNC: 13 MMOL/L
APPEARANCE: ABNORMAL
AST SERPL-CCNC: 17 U/L
BACTERIA: ABNORMAL
BASOPHILS # BLD AUTO: 0.04 K/UL
BASOPHILS NFR BLD AUTO: 0.6 %
BILIRUB SERPL-MCNC: 0.5 MG/DL
BILIRUBIN URINE: NEGATIVE
BLOOD URINE: NEGATIVE
BUN SERPL-MCNC: 17 MG/DL
CALCIUM SERPL-MCNC: 10 MG/DL
CHLORIDE SERPL-SCNC: 103 MMOL/L
CHOLEST SERPL-MCNC: 172 MG/DL
CO2 SERPL-SCNC: 23 MMOL/L
COLOR: YELLOW
CREAT SERPL-MCNC: 0.66 MG/DL
EGFR: 127 ML/MIN/1.73M2
EOSINOPHIL # BLD AUTO: 0.07 K/UL
EOSINOPHIL NFR BLD AUTO: 1 %
GLUCOSE QUALITATIVE U: NEGATIVE
GLUCOSE SERPL-MCNC: 99 MG/DL
HCT VFR BLD CALC: 43.7 %
HDLC SERPL-MCNC: 49 MG/DL
HGB BLD-MCNC: 12.9 G/DL
HYALINE CASTS: 0 /LPF
IMM GRANULOCYTES NFR BLD AUTO: 0.1 %
KETONES URINE: NEGATIVE
LDLC SERPL CALC-MCNC: 96 MG/DL
LEUKOCYTE ESTERASE URINE: ABNORMAL
LYMPHOCYTES # BLD AUTO: 1.4 K/UL
LYMPHOCYTES NFR BLD AUTO: 20.6 %
MAN DIFF?: NORMAL
MCHC RBC-ENTMCNC: 26.8 PG
MCHC RBC-ENTMCNC: 29.5 GM/DL
MCV RBC AUTO: 90.9 FL
MICROSCOPIC-UA: NORMAL
MONOCYTES # BLD AUTO: 0.42 K/UL
MONOCYTES NFR BLD AUTO: 6.2 %
NEUTROPHILS # BLD AUTO: 4.86 K/UL
NEUTROPHILS NFR BLD AUTO: 71.5 %
NITRITE URINE: NEGATIVE
NONHDLC SERPL-MCNC: 123 MG/DL
PH URINE: 6
PLATELET # BLD AUTO: 279 K/UL
POTASSIUM SERPL-SCNC: 4.5 MMOL/L
PROT SERPL-MCNC: 7.5 G/DL
PROTEIN URINE: NORMAL
RBC # BLD: 4.81 M/UL
RBC # FLD: 13.7 %
RED BLOOD CELLS URINE: 2 /HPF
SODIUM SERPL-SCNC: 139 MMOL/L
SPECIFIC GRAVITY URINE: 1.03
SQUAMOUS EPITHELIAL CELLS: 8 /HPF
TRIGL SERPL-MCNC: 135 MG/DL
TSH SERPL-ACNC: 1.07 UIU/ML
URINE COMMENTS: NORMAL
UROBILINOGEN URINE: NORMAL
VIT B12 SERPL-MCNC: 458 PG/ML
WBC # FLD AUTO: 6.8 K/UL
WHITE BLOOD CELLS URINE: 122 /HPF

## 2022-08-10 ENCOUNTER — APPOINTMENT (OUTPATIENT)
Dept: OTOLARYNGOLOGY | Facility: AMBULATORY SURGERY CENTER | Age: 22
End: 2022-08-10

## 2022-10-14 ENCOUNTER — APPOINTMENT (OUTPATIENT)
Dept: OBGYN | Facility: CLINIC | Age: 22
End: 2022-10-14

## 2022-10-14 VITALS
DIASTOLIC BLOOD PRESSURE: 80 MMHG | WEIGHT: 152 LBS | HEIGHT: 65 IN | SYSTOLIC BLOOD PRESSURE: 114 MMHG | BODY MASS INDEX: 25.33 KG/M2

## 2022-10-14 DIAGNOSIS — L02.419 CUTANEOUS ABSCESS OF LIMB, UNSPECIFIED: ICD-10-CM

## 2022-10-14 PROCEDURE — 99213 OFFICE O/P EST LOW 20 MIN: CPT

## 2022-10-17 ENCOUNTER — NON-APPOINTMENT (OUTPATIENT)
Age: 22
End: 2022-10-17

## 2022-10-17 ENCOUNTER — APPOINTMENT (OUTPATIENT)
Dept: ORTHOPEDIC SURGERY | Facility: CLINIC | Age: 22
End: 2022-10-17

## 2022-10-17 VITALS
SYSTOLIC BLOOD PRESSURE: 116 MMHG | WEIGHT: 152 LBS | BODY MASS INDEX: 25.33 KG/M2 | HEIGHT: 65 IN | DIASTOLIC BLOOD PRESSURE: 79 MMHG

## 2022-10-19 ENCOUNTER — APPOINTMENT (OUTPATIENT)
Dept: SURGERY | Facility: CLINIC | Age: 22
End: 2022-10-19

## 2022-10-19 ENCOUNTER — TRANSCRIPTION ENCOUNTER (OUTPATIENT)
Age: 22
End: 2022-10-19

## 2022-10-19 VITALS
SYSTOLIC BLOOD PRESSURE: 112 MMHG | HEART RATE: 73 BPM | OXYGEN SATURATION: 99 % | DIASTOLIC BLOOD PRESSURE: 74 MMHG | WEIGHT: 147.5 LBS | BODY MASS INDEX: 24.57 KG/M2 | HEIGHT: 65 IN | TEMPERATURE: 98.2 F

## 2022-10-19 LAB — BACTERIA SPEC CULT: ABNORMAL

## 2022-10-19 PROCEDURE — 99203 OFFICE O/P NEW LOW 30 MIN: CPT

## 2022-10-19 NOTE — PHYSICAL EXAM
[FreeTextEntry2] : Right axilla, + erythema, + induration along the midline of the axilla, + tenerness, +spontaneous pus draining from the center of the axilla, and previous incision, from a pinpoint openning.  [FreeTextEntry6] : no masses, no nodes, no nipple discharge b/L + erythema, + induration, and drainage of pus, in the right adnexa

## 2022-10-19 NOTE — DISCUSSION/SUMMARY
[FreeTextEntry1] : 20 y/o P0\par spontaneous drainage of right axillary abscess\par Abscess expressed, not probed,  +culture sent\par Kelfex/antibiotics started\par precautions given\par pt has appointment with a surgeon in 3 days\par F/u 2weeks if needed, PRN, call for update, annual exam.

## 2022-10-19 NOTE — HISTORY OF PRESENT ILLNESS
[FreeTextEntry1] : 20 y/o P0 pt present c/o pain under left axilla for 2 days\par no fevers, +shaving\par Patinet had an I/D for an axillary abscess, same location 2 years ago. \par

## 2022-10-24 ENCOUNTER — APPOINTMENT (OUTPATIENT)
Dept: SURGERY | Facility: CLINIC | Age: 22
End: 2022-10-24

## 2022-10-26 ENCOUNTER — OUTPATIENT (OUTPATIENT)
Dept: OUTPATIENT SERVICES | Facility: HOSPITAL | Age: 22
LOS: 1 days | End: 2022-10-26

## 2022-10-26 VITALS
TEMPERATURE: 97 F | WEIGHT: 145.95 LBS | OXYGEN SATURATION: 97 % | HEIGHT: 64.5 IN | DIASTOLIC BLOOD PRESSURE: 79 MMHG | HEART RATE: 70 BPM | SYSTOLIC BLOOD PRESSURE: 112 MMHG | RESPIRATION RATE: 16 BRPM

## 2022-10-26 DIAGNOSIS — L73.2 HIDRADENITIS SUPPURATIVA: ICD-10-CM

## 2022-10-26 DIAGNOSIS — Z98.890 OTHER SPECIFIED POSTPROCEDURAL STATES: Chronic | ICD-10-CM

## 2022-10-26 DIAGNOSIS — M26.02 MAXILLARY HYPOPLASIA: Chronic | ICD-10-CM

## 2022-10-26 DIAGNOSIS — Z90.3 ACQUIRED ABSENCE OF STOMACH [PART OF]: Chronic | ICD-10-CM

## 2022-10-26 LAB
ANION GAP SERPL CALC-SCNC: 18 MMOL/L — HIGH (ref 7–14)
BUN SERPL-MCNC: 6 MG/DL — LOW (ref 7–23)
CALCIUM SERPL-MCNC: 9.6 MG/DL — SIGNIFICANT CHANGE UP (ref 8.4–10.5)
CHLORIDE SERPL-SCNC: 101 MMOL/L — SIGNIFICANT CHANGE UP (ref 98–107)
CO2 SERPL-SCNC: 21 MMOL/L — LOW (ref 22–31)
CREAT SERPL-MCNC: 0.43 MG/DL — LOW (ref 0.5–1.3)
EGFR: 141 ML/MIN/1.73M2 — SIGNIFICANT CHANGE UP
GLUCOSE SERPL-MCNC: 61 MG/DL — LOW (ref 70–99)
HCG UR QL: NEGATIVE — SIGNIFICANT CHANGE UP
HCT VFR BLD CALC: 42.8 % — SIGNIFICANT CHANGE UP (ref 34.5–45)
HGB BLD-MCNC: 13.7 G/DL — SIGNIFICANT CHANGE UP (ref 11.5–15.5)
MCHC RBC-ENTMCNC: 27.7 PG — SIGNIFICANT CHANGE UP (ref 27–34)
MCHC RBC-ENTMCNC: 32 GM/DL — SIGNIFICANT CHANGE UP (ref 32–36)
MCV RBC AUTO: 86.5 FL — SIGNIFICANT CHANGE UP (ref 80–100)
NRBC # BLD: 0 /100 WBCS — SIGNIFICANT CHANGE UP (ref 0–0)
NRBC # FLD: 0 K/UL — SIGNIFICANT CHANGE UP (ref 0–0)
PLATELET # BLD AUTO: 241 K/UL — SIGNIFICANT CHANGE UP (ref 150–400)
POTASSIUM SERPL-MCNC: 3.8 MMOL/L — SIGNIFICANT CHANGE UP (ref 3.5–5.3)
POTASSIUM SERPL-SCNC: 3.8 MMOL/L — SIGNIFICANT CHANGE UP (ref 3.5–5.3)
RBC # BLD: 4.95 M/UL — SIGNIFICANT CHANGE UP (ref 3.8–5.2)
RBC # FLD: 14.4 % — SIGNIFICANT CHANGE UP (ref 10.3–14.5)
SODIUM SERPL-SCNC: 140 MMOL/L — SIGNIFICANT CHANGE UP (ref 135–145)
WBC # BLD: 5.45 K/UL — SIGNIFICANT CHANGE UP (ref 3.8–10.5)
WBC # FLD AUTO: 5.45 K/UL — SIGNIFICANT CHANGE UP (ref 3.8–10.5)

## 2022-10-26 RX ORDER — ESOMEPRAZOLE MAGNESIUM 40 MG/1
1 CAPSULE, DELAYED RELEASE ORAL
Qty: 0 | Refills: 0 | DISCHARGE

## 2022-10-26 NOTE — H&P PST ADULT - NSICDXPASTMEDICALHX_GEN_ALL_CORE_FT
PAST MEDICAL HISTORY:  2019 novel coronavirus disease (COVID-19) 7/2022    Asymmetrical Hearing Loss some left hearing loss    Cleft hard palate with lip as well    Cleft lip     History of frequent ear infections     Mandibular hyperplasia     Maxillary hypoplasia      PAST MEDICAL HISTORY:  2019 novel coronavirus disease (COVID-19) 7/2022    Asymmetrical Hearing Loss some left hearing loss    Cleft hard palate with lip as well    Cleft lip     H/O: obesity     History of frequent ear infections     Mandibular hyperplasia     Maxillary hypoplasia

## 2022-10-26 NOTE — H&P PST ADULT - NSICDXPASTSURGICALHX_GEN_ALL_CORE_FT
PAST SURGICAL HISTORY:  Cleft Palate 3mnths, 6mnths and [age] year of age.   Cleft lip/palate repair with Dr. Elsy Osman. no complications.  2 palate surgeries with Dr. Bailey, most recent at 9 years of age.   Nasal surgery in [place] ([facility]) 11/2016    Ear Problem Left ear perforated ear drum repair at 6 years of age.   ear surgery 2021 "to widen ear canal"    H/O gastric sleeve 8/2022    History of incision and drainage of right axillary abscess 2020 , 2022    Maxillary hypoplasia s/p Lefort procedure in 6/2016.

## 2022-10-26 NOTE — H&P PST ADULT - VENOUS THROMBOEMBOLISM CURRENT STATUS
Normal rate, regular rhythm.  Heart sounds S1, S2.  No murmurs, rubs or gallops. (0) indicator not present

## 2022-10-26 NOTE — H&P PST ADULT - HISTORY OF PRESENT ILLNESS
22 year old female with hx of right axillary hidradenitits suppurativa with c/o painful abcess to right axilla x 2 weeks. Pt reports that yesterday she went to an urgent care center and had it drained and was prescribed Bactrim. Pt presents today for presurgical evaluation for ... 22 year old female with hx of right axillary hidradenitits suppurativa with c/o painful abcess to right axilla x 2 weeks. Pt reports that yesterday she went to an urgent care center and had it drained and was prescribed Bactrim. Pt presents today for presurgical evaluation for Wide Excision of Right Axillary Hidradenitis.

## 2022-10-26 NOTE — H&P PST ADULT - PROBLEM SELECTOR PLAN 1
Pt scheduled for surgery on 11/14/22.   Pre-op instructions provided. Pt verbalized understanding.   Pepcid provided for GI prophylaxis.   Order placed for preop COVID PCR testing.   Pt given urine specimen cup for ucg on admission. Pt scheduled for surgery on 11/14/22.   Pre-op instructions provided. Pt verbalized understanding.   Pepcid provided for GI prophylaxis.   Order placed for preop COVID PCR testing.   Pt given urine specimen cup for ucg on admission.  Notified surgeon via email that pt had gone to urgent care center and had I&D and was prescribed antibiotics.

## 2022-10-26 NOTE — H&P PST ADULT - I HAVE PERSONALLY SEEN AND EXAMINED THE PATIENT. THERE HAVE NOT BEEN ANY CHANGES IN THE PATIENT'S HISTORY OR EXAM UNLESS COMMENTED BELOW
[facility]  Discharge Summary    Physician: Shruthi Velarde MD    Admission Diagnosis:  Active Problems:    Anemia, unspecified type      Discharge Diagnosis:  Acute blood loss anemia     Hospital course:   Pt was admitted on POD 2 following TLH d/t symptomatic anemia. She received 1 unit PRBCs. Serial CBCs showed that her Hb was stable. She was tachycardic on admission to 120s, pulse back down to 80s and feeling asymptomatic on the day of discharge.      Physical Exam:    Vitals:   Vitals:    10/06/22 2018 10/06/22 2326 10/07/22 0300 10/07/22 0731   BP:  115/70 105/65 121/65   BP Location:  Right arm Right arm Right arm   Patient Position:  Lying Lying Sitting   Pulse:  89 85 102   Resp:  16 16 17   Temp:  98.7 °F (37.1 °C) 98.8 °F (37.1 °C) 98.8 °F (37.1 °C)   TempSrc:  Oral Oral Oral   SpO2:  97% 98% 97%   Weight: 93.2 kg (205 lb 6.8 oz)      Height:         Temp (24hrs), Av.6 °F (37 °C), Min:98.1 °F (36.7 °C), Max:98.9 °F (37.2 °C)      General Appearance:    Alert, cooperative, in no acute distress   Abdomen:    S/NT/ND            Extremities: Moves all extremities well, No edema , no cyanosis, no redness.     Labs:   Results from last 7 days   Lab Units 10/07/22  0744 10/06/22  1205 10/06/22  0543 10/05/22  2230 10/05/22  1606 10/05/22  1115   WBC 10*3/mm3 8.10  --   --  8.60  --  7.50   HEMOGLOBIN g/dL 7.9* 7.3* 7.2* 7.3*   < > 6.1*   HEMATOCRIT % 23.9* 22.1* 22.0* 22.2*   < > 18.7*   PLATELETS 10*3/mm3 197  --   --  172  --  170    < > = values in this interval not displayed.     Results from last 7 days   Lab Units 10/05/22  1115   GLUCOSE mg/dL 92         Plan:  Discharge to home.    Follow-up appointment with Dr. Shruthi Velarde in 1 week.  All discharge instructions were reviewed with pt     
Statement Selected

## 2022-10-28 PROBLEM — U07.1 COVID-19: Chronic | Status: ACTIVE | Noted: 2022-10-26

## 2022-10-28 PROBLEM — Z86.39 PERSONAL HISTORY OF OTHER ENDOCRINE, NUTRITIONAL AND METABOLIC DISEASE: Chronic | Status: ACTIVE | Noted: 2022-10-26

## 2022-11-10 ENCOUNTER — LABORATORY RESULT (OUTPATIENT)
Age: 22
End: 2022-11-10

## 2022-11-14 ENCOUNTER — RESULT REVIEW (OUTPATIENT)
Age: 22
End: 2022-11-14

## 2022-11-14 ENCOUNTER — TRANSCRIPTION ENCOUNTER (OUTPATIENT)
Age: 22
End: 2022-11-14

## 2022-11-14 ENCOUNTER — APPOINTMENT (OUTPATIENT)
Dept: SURGERY | Facility: AMBULATORY SURGERY CENTER | Age: 22
End: 2022-11-14

## 2022-11-14 ENCOUNTER — OUTPATIENT (OUTPATIENT)
Dept: OUTPATIENT SERVICES | Facility: HOSPITAL | Age: 22
LOS: 1 days | Discharge: HOME | End: 2022-11-14
Payer: COMMERCIAL

## 2022-11-14 VITALS
DIASTOLIC BLOOD PRESSURE: 84 MMHG | RESPIRATION RATE: 18 BRPM | TEMPERATURE: 98 F | OXYGEN SATURATION: 98 % | SYSTOLIC BLOOD PRESSURE: 122 MMHG | HEART RATE: 63 BPM | HEIGHT: 64.5 IN | WEIGHT: 145.95 LBS

## 2022-11-14 VITALS
HEART RATE: 60 BPM | OXYGEN SATURATION: 99 % | SYSTOLIC BLOOD PRESSURE: 110 MMHG | DIASTOLIC BLOOD PRESSURE: 74 MMHG | RESPIRATION RATE: 15 BRPM

## 2022-11-14 DIAGNOSIS — Z90.3 ACQUIRED ABSENCE OF STOMACH [PART OF]: Chronic | ICD-10-CM

## 2022-11-14 DIAGNOSIS — L73.2 HIDRADENITIS SUPPURATIVA: ICD-10-CM

## 2022-11-14 DIAGNOSIS — Z98.890 OTHER SPECIFIED POSTPROCEDURAL STATES: Chronic | ICD-10-CM

## 2022-11-14 DIAGNOSIS — M26.02 MAXILLARY HYPOPLASIA: Chronic | ICD-10-CM

## 2022-11-14 PROCEDURE — 88305 TISSUE EXAM BY PATHOLOGIST: CPT | Mod: 26

## 2022-11-14 PROCEDURE — 11451 EXC SKN HDRDNT AX COMPLEX: CPT | Mod: GC,RT

## 2022-11-14 NOTE — ASU DISCHARGE PLAN (ADULT/PEDIATRIC) - CALL YOUR DOCTOR IF YOU HAVE ANY OF THE FOLLOWING:
Bleeding that does not stop/Swelling that gets worse/Fever greater than (need to indicate Fahrenheit or Celsius)/Wound/Surgical Site with redness, or foul smelling discharge or pus Bleeding that does not stop/Swelling that gets worse/Fever greater than (need to indicate Fahrenheit or Celsius)/Wound/Surgical Site with redness, or foul smelling discharge or pus/Nausea and vomiting that does not stop

## 2022-11-14 NOTE — BRIEF OPERATIVE NOTE - NSICDXBRIEFPROCEDURE_GEN_ALL_CORE_FT
PROCEDURES:  Surgical removal and complex repair of skin of axilla for hidradenitis 14-Nov-2022 15:25:27  Rafael Sy

## 2022-11-14 NOTE — ASU DISCHARGE PLAN (ADULT/PEDIATRIC) - ASU DC SPECIAL INSTRUCTIONSFT
ice packs on-off for 24hrs    f/u 1 week with Dr. Vitale    once dressing is removed in 48 hrs, shower over sutures and apply bacitracin daily and cover with dry 4x4

## 2022-11-14 NOTE — ASU DISCHARGE PLAN (ADULT/PEDIATRIC) - NS MD DC FALL RISK RISK
For information on Fall & Injury Prevention, visit: [email or web address] OR  [email or web address] OR  https://www.cdc.gov/steadi/patient.html

## 2022-11-17 LAB — SURGICAL PATHOLOGY STUDY: SIGNIFICANT CHANGE UP

## 2022-11-23 ENCOUNTER — APPOINTMENT (OUTPATIENT)
Dept: SURGERY | Facility: CLINIC | Age: 22
End: 2022-11-23

## 2022-11-23 VITALS
HEIGHT: 65 IN | BODY MASS INDEX: 24.49 KG/M2 | SYSTOLIC BLOOD PRESSURE: 115 MMHG | DIASTOLIC BLOOD PRESSURE: 78 MMHG | HEART RATE: 72 BPM | WEIGHT: 147 LBS | OXYGEN SATURATION: 95 % | TEMPERATURE: 98.2 F

## 2022-11-23 PROCEDURE — 99024 POSTOP FOLLOW-UP VISIT: CPT

## 2022-12-12 ENCOUNTER — APPOINTMENT (OUTPATIENT)
Dept: SURGERY | Facility: CLINIC | Age: 22
End: 2022-12-12

## 2022-12-13 ENCOUNTER — APPOINTMENT (OUTPATIENT)
Dept: OBGYN | Facility: CLINIC | Age: 22
End: 2022-12-13

## 2023-01-06 ENCOUNTER — APPOINTMENT (OUTPATIENT)
Dept: OTOLARYNGOLOGY | Facility: CLINIC | Age: 23
End: 2023-01-06

## 2023-01-09 ENCOUNTER — APPOINTMENT (OUTPATIENT)
Dept: INTERNAL MEDICINE | Facility: CLINIC | Age: 23
End: 2023-01-09
Payer: COMMERCIAL

## 2023-01-09 ENCOUNTER — NON-APPOINTMENT (OUTPATIENT)
Age: 23
End: 2023-01-09

## 2023-01-09 PROCEDURE — G0008: CPT

## 2023-01-09 PROCEDURE — 90682 RIV4 VACC RECOMBINANT DNA IM: CPT

## 2023-02-14 ENCOUNTER — APPOINTMENT (OUTPATIENT)
Dept: OBGYN | Facility: CLINIC | Age: 23
End: 2023-02-14
Payer: COMMERCIAL

## 2023-02-14 VITALS
HEIGHT: 65 IN | SYSTOLIC BLOOD PRESSURE: 121 MMHG | WEIGHT: 114 LBS | DIASTOLIC BLOOD PRESSURE: 76 MMHG | BODY MASS INDEX: 18.99 KG/M2

## 2023-02-14 DIAGNOSIS — B37.31 ACUTE CANDIDIASIS OF VULVA AND VAGINA: ICD-10-CM

## 2023-02-14 PROCEDURE — 99213 OFFICE O/P EST LOW 20 MIN: CPT

## 2023-02-21 ENCOUNTER — TRANSCRIPTION ENCOUNTER (OUTPATIENT)
Age: 23
End: 2023-02-21

## 2023-02-21 LAB
C TRACH RRNA SPEC QL NAA+PROBE: NOT DETECTED
CANDIDA VAG CYTO: NOT DETECTED
G VAGINALIS+PREV SP MTYP VAG QL MICRO: DETECTED
N GONORRHOEA RRNA SPEC QL NAA+PROBE: NOT DETECTED
SOURCE AMPLIFICATION: NORMAL
T VAGINALIS VAG QL WET PREP: NOT DETECTED

## 2023-02-22 ENCOUNTER — APPOINTMENT (OUTPATIENT)
Dept: OTOLARYNGOLOGY | Facility: CLINIC | Age: 23
End: 2023-02-22
Payer: COMMERCIAL

## 2023-02-22 PROCEDURE — 99214 OFFICE O/P EST MOD 30 MIN: CPT | Mod: 25

## 2023-02-22 PROCEDURE — 69210 REMOVE IMPACTED EAR WAX UNI: CPT

## 2023-02-22 NOTE — ASSESSMENT
[FreeTextEntry1] : Exam today shows narrow right ear canal with ceruminous debris accumulation, left ear canal widely patent without significant debris accumulation.  Bilateral tympanic membranes intact.  Bilateral facial nerve function normal.\par \par Again discussed moving forward with right meatoplasty, discussed alternatives such as observation with periodic administration of topical powder.  Patient would like to move forward with right ear surgery given she had excellent benefit with left ear procedure, will follow-up for scheduling.  She also reports increased discomfort involving right nasal tip, no obvious sign of infection noted today but given tenderness will prescribe Augmentin and recommended follow-up with plastic surgery.

## 2023-02-22 NOTE — HISTORY OF PRESENT ILLNESS
[de-identified] : 21 y/o F presents for follow up\par left bony canalplasty, meatoplasty 1/4/22\par reports recent right otalgia and otorrhea that started 3 days ago\par denies changes in hearing, tinnitus, dizziness, vertigo, or headaches related to hearing

## 2023-03-06 ENCOUNTER — TRANSCRIPTION ENCOUNTER (OUTPATIENT)
Age: 23
End: 2023-03-06

## 2023-03-06 LAB
FSH SERPL-MCNC: 6 IU/L
PROLACTIN SERPL-MCNC: 17.8 NG/ML
TSH SERPL-ACNC: 0.85 UIU/ML

## 2023-03-07 NOTE — DISCUSSION/SUMMARY
[FreeTextEntry1] : irregular menses 2x Janyary less than 21 days apart,labs \par weight loss due to [place] \par Vulvitis, discharge, yeast likely, perienal care discussed \par Breast tnederness, breast us \par F/u 2 weeks for telehealth

## 2023-03-07 NOTE — HISTORY OF PRESENT ILLNESS
[FreeTextEntry1] : \par \par Gastric sleeve August 2022\par irregualr menses\par discharge \par UTD with Gardasil\par Breast tenderness \par Had COVID 2 years ago \par

## 2023-03-07 NOTE — PHYSICAL EXAM
[Examination Of The Breasts] : a normal appearance [No Masses] : no breast masses were palpable [Normal] : normal external genitalia [FreeTextEntry1] : white plaque on mons, above clitoris  +annular hymen, yellow discharge

## 2023-03-29 ENCOUNTER — OUTPATIENT (OUTPATIENT)
Dept: OUTPATIENT SERVICES | Facility: HOSPITAL | Age: 23
LOS: 1 days | End: 2023-03-29

## 2023-03-29 VITALS
TEMPERATURE: 97 F | OXYGEN SATURATION: 99 % | SYSTOLIC BLOOD PRESSURE: 132 MMHG | RESPIRATION RATE: 16 BRPM | WEIGHT: 108.03 LBS | DIASTOLIC BLOOD PRESSURE: 80 MMHG | HEART RATE: 68 BPM | HEIGHT: 65 IN

## 2023-03-29 DIAGNOSIS — H90.12 CONDUCTIVE HEARING LOSS, UNILATERAL, LEFT EAR, WITH UNRESTRICTED HEARING ON THE CONTRALATERAL SIDE: ICD-10-CM

## 2023-03-29 DIAGNOSIS — Z90.3 ACQUIRED ABSENCE OF STOMACH [PART OF]: Chronic | ICD-10-CM

## 2023-03-29 DIAGNOSIS — H60.8X2 OTHER OTITIS EXTERNA, LEFT EAR: ICD-10-CM

## 2023-03-29 DIAGNOSIS — H93.292 OTHER ABNORMAL AUDITORY PERCEPTIONS, LEFT EAR: ICD-10-CM

## 2023-03-29 DIAGNOSIS — H72.02 CENTRAL PERFORATION OF TYMPANIC MEMBRANE, LEFT EAR: ICD-10-CM

## 2023-03-29 DIAGNOSIS — Z98.890 OTHER SPECIFIED POSTPROCEDURAL STATES: Chronic | ICD-10-CM

## 2023-03-29 DIAGNOSIS — M26.02 MAXILLARY HYPOPLASIA: Chronic | ICD-10-CM

## 2023-03-29 DIAGNOSIS — H72.01 CENTRAL PERFORATION OF TYMPANIC MEMBRANE, RIGHT EAR: ICD-10-CM

## 2023-03-29 DIAGNOSIS — H60.391 OTHER INFECTIVE OTITIS EXTERNA, RIGHT EAR: ICD-10-CM

## 2023-03-29 LAB
ANION GAP SERPL CALC-SCNC: 14 MMOL/L — SIGNIFICANT CHANGE UP (ref 7–14)
BUN SERPL-MCNC: 10 MG/DL — SIGNIFICANT CHANGE UP (ref 7–23)
CALCIUM SERPL-MCNC: 9.8 MG/DL — SIGNIFICANT CHANGE UP (ref 8.4–10.5)
CHLORIDE SERPL-SCNC: 103 MMOL/L — SIGNIFICANT CHANGE UP (ref 98–107)
CO2 SERPL-SCNC: 25 MMOL/L — SIGNIFICANT CHANGE UP (ref 22–31)
CREAT SERPL-MCNC: 0.7 MG/DL — SIGNIFICANT CHANGE UP (ref 0.5–1.3)
EGFR: 125 ML/MIN/1.73M2 — SIGNIFICANT CHANGE UP
GLUCOSE SERPL-MCNC: 81 MG/DL — SIGNIFICANT CHANGE UP (ref 70–99)
HCG UR QL: NEGATIVE — SIGNIFICANT CHANGE UP
HCT VFR BLD CALC: 41.9 % — SIGNIFICANT CHANGE UP (ref 34.5–45)
HGB BLD-MCNC: 13.1 G/DL — SIGNIFICANT CHANGE UP (ref 11.5–15.5)
MCHC RBC-ENTMCNC: 27.5 PG — SIGNIFICANT CHANGE UP (ref 27–34)
MCHC RBC-ENTMCNC: 31.3 GM/DL — LOW (ref 32–36)
MCV RBC AUTO: 88 FL — SIGNIFICANT CHANGE UP (ref 80–100)
NRBC # BLD: 0 /100 WBCS — SIGNIFICANT CHANGE UP (ref 0–0)
NRBC # FLD: 0 K/UL — SIGNIFICANT CHANGE UP (ref 0–0)
PLATELET # BLD AUTO: 299 K/UL — SIGNIFICANT CHANGE UP (ref 150–400)
POTASSIUM SERPL-MCNC: 4.1 MMOL/L — SIGNIFICANT CHANGE UP (ref 3.5–5.3)
POTASSIUM SERPL-SCNC: 4.1 MMOL/L — SIGNIFICANT CHANGE UP (ref 3.5–5.3)
RBC # BLD: 4.76 M/UL — SIGNIFICANT CHANGE UP (ref 3.8–5.2)
RBC # FLD: 12.3 % — SIGNIFICANT CHANGE UP (ref 10.3–14.5)
SODIUM SERPL-SCNC: 142 MMOL/L — SIGNIFICANT CHANGE UP (ref 135–145)
WBC # BLD: 4.55 K/UL — SIGNIFICANT CHANGE UP (ref 3.8–10.5)
WBC # FLD AUTO: 4.55 K/UL — SIGNIFICANT CHANGE UP (ref 3.8–10.5)

## 2023-03-29 NOTE — H&P PST ADULT - NSICDXPASTMEDICALHX_GEN_ALL_CORE_FT
PAST MEDICAL HISTORY:  2019 novel coronavirus disease (COVID-19) 7/2022    Asymmetrical Hearing Loss some left hearing loss    Central perforation of tympanic membrane, left ear     Cleft hard palate with lip as well    Cleft lip     H/O: obesity     History of frequent ear infections     Mandibular hyperplasia     Maxillary hypoplasia

## 2023-03-29 NOTE — H&P PST ADULT - ENT GEN HX ROS MEA POS PC
hearing difficulty/ear pain hx of cleft lip palate - hx of jaw surgery 2016/hearing difficulty/ear pain

## 2023-03-29 NOTE — H&P PST ADULT - NSICDXPASTSURGICALHX_GEN_ALL_CORE_FT
PAST SURGICAL HISTORY:  Cleft Palate 3mnths, 6mnths and [age] year of age.   Cleft lip/palate repair with Dr. Elsy Osman. no complications.  2 palate surgeries with Dr. Bailey, most recent at 9 years of age.   Nasal surgery in [place] ([facility]) 11/2016    Ear Problem Left ear perforated ear drum repair at 6 years of age.   ear surgery 2021 "to widen ear canal"    H/O gastric sleeve 8/2022    History of incision and drainage of right axillary abscess 2020 , 2022    Maxillary hypoplasia s/p Lefort procedure in 6/2016.

## 2023-03-29 NOTE — H&P PST ADULT - PROBLEM SELECTOR PLAN 1
Patient tentatively scheduled for  tympanoplasty , fat/fascia graft , microscope use , meatoplasty on 04/12/2023.  Pt reports she is having surgery on her right ear on 04/12/23.   Booking sheet says left ear surgery.  Emailed  surgeon .  copy in chart.    Pre-op instructions provided. Pt given verbal and written instructions with teach back on pepcid. Pt verbalized understanding with return demonstration.    Urine cup provided for day of procedure pregnancy test.     Labs done. Patient tentatively scheduled for  tympanoplasty , fat/fascia graft , microscope use , meatoplasty on 04/12/2023.  Pt reports she is having surgery on her right ear on 04/12/23.   Booking sheet pre op diagnosis reflects  left ear diagnoisis.  Emailed  surgeon .  copy in chart.    Pre-op instructions provided. Pt given verbal and written instructions with teach back on pepcid. Pt verbalized understanding with return demonstration.    Urine cup provided for day of procedure pregnancy test.     Labs done.

## 2023-03-29 NOTE — H&P PST ADULT - HISTORY OF PRESENT ILLNESS
22 year old female with pre op dx of central perforation of tympanic membrane  22 year old female with pre op dx of central perforation of tympanic membrane left ear is scheduled for tympanoplasty , fat/fascia graft , microscope use , meatoplasty. 22 year old female with pre op dx of other  otitis externa ,left ear is scheduled for tympanoplasty , fat/fascia graft , microscope use , meatoplasty.

## 2023-03-29 NOTE — H&P PST ADULT - ENMT COMMENTS
Pt c/o recurrent ear infections Pre op dx- Central perforation of tympanic membrane right ear. Pt c/o recurrent ear infections . Pt reports she had a recent right ear infection on 02/23 . Pt says " my surgeon is going to widen my right ear canal so I don't get no ear infection" Pre op dx- Other infective otitis externa, right ear

## 2023-03-29 NOTE — H&P PST ADULT - MUSCULOSKELETAL
ROM intact/normal gait/strength 5/5 bilateral upper extremities/strength 5/5 bilateral lower extremities/extremities exam negative

## 2023-04-05 ENCOUNTER — APPOINTMENT (OUTPATIENT)
Dept: DERMATOLOGY | Facility: CLINIC | Age: 23
End: 2023-04-05
Payer: COMMERCIAL

## 2023-04-05 VITALS — WEIGHT: 120 LBS | HEIGHT: 65 IN | BODY MASS INDEX: 19.99 KG/M2

## 2023-04-05 DIAGNOSIS — L65.9 NONSCARRING HAIR LOSS, UNSPECIFIED: ICD-10-CM

## 2023-04-05 PROBLEM — H72.02 CENTRAL PERFORATION OF TYMPANIC MEMBRANE, LEFT EAR: Chronic | Status: ACTIVE | Noted: 2023-03-29

## 2023-04-05 PROCEDURE — 99214 OFFICE O/P EST MOD 30 MIN: CPT

## 2023-04-05 NOTE — HISTORY OF PRESENT ILLNESS
[FreeTextEntry1] : indu [de-identified] : Ms. CARRIE MILLER  is a 22 year old F with cleft lip/palate, recent gastric sleeve here for evaluation of below\par \par #Rashes, face, on/off for 1 year. Itchy hot red raised area comes and goes within 24 hours. Denies sore throat, SOB, difficulty breathing, tongue swelling, or eye discomfort.\par Had covid in July 2022, had gastric sleeve sx in Aug 2022. Rash onset prior to these. \par Notes negative allergist workup. \par Using Clinique moisturizer and LRP cleanser\par \par #Hairloss, +dandruff, xmonths. \par \par Derm hx: HS\par No personal or family hx of skin cancer\par Social hx: here with parents Asif (dad) and Saima (mom). 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

## 2023-04-05 NOTE — PHYSICAL EXAM
[Alert] : alert [Oriented x 3] : ~L oriented x 3 [Well Nourished] : well nourished [Conjunctiva Non-injected] : conjunctiva non-injected [No Visual Lymphadenopathy] : no visual  lymphadenopathy [No Clubbing] : no clubbing [No Edema] : no edema [No Bromhidrosis] : no bromhidrosis [No Chromhidrosis] : no chromhidrosis [Declined] : declined [FreeTextEntry3] : Focused exam only (see below) per patient request:\par \par no active rash on face; pt with photo on phone with red urticarial plaque on L cheek\par \par scalp with diffuse greasy scaling, reduced hair density on vertex \par \par

## 2023-04-05 NOTE — ASSESSMENT
[FreeTextEntry1] : #Rash, face - new dx of uncertain prognosis\par Favor chronic urticaria based on photos, although difficult to confirm as no active lesions on face today\par - Diagnosis and treatment options discussed\par We have discussed the nature and course of this condition.\par I have discussed the goals of therapy with the patient.\par We have discussed treatment options and expectations from treatment.\par - RTC PRN flare to evaluate\par - Start Allegra 180mg once daily\par - Rule out autoimmune thyroid disease with PCP\par \par #Seborrheic dermatitis, moderate - new dx of uncertain prognosis\par #Hairloss\par - Dx and tx discussed\par - Start ketoconazole 2% shampoo EVERY OTHER DAY to affected areas on scalp. Leave on for 10 mins prior to rinsing off\par Alternate use with OTC Head & Shoulders shampoo \par \par RTC 4 months

## 2023-04-06 ENCOUNTER — APPOINTMENT (OUTPATIENT)
Dept: OBGYN | Facility: CLINIC | Age: 23
End: 2023-04-06
Payer: COMMERCIAL

## 2023-04-06 VITALS
HEIGHT: 65 IN | SYSTOLIC BLOOD PRESSURE: 112 MMHG | BODY MASS INDEX: 17.68 KG/M2 | WEIGHT: 106.13 LBS | DIASTOLIC BLOOD PRESSURE: 70 MMHG

## 2023-04-06 DIAGNOSIS — N92.6 IRREGULAR MENSTRUATION, UNSPECIFIED: ICD-10-CM

## 2023-04-06 PROCEDURE — 99212 OFFICE O/P EST SF 10 MIN: CPT

## 2023-04-06 NOTE — HISTORY OF PRESENT ILLNESS
[FreeTextEntry1] : 23 y/o Menses 1/5, 1/24, 2/17, 3/24\par duration of menses 6 days\par [place] and continues to losse weight Pt 5ft 5 and is 108 lbs\par [place] advised her not to loos any more weight and patient states it's hard to maintain her weight, keeps loosing \par Patinet not sexually active \par

## 2023-04-06 NOTE — DISCUSSION/SUMMARY
[FreeTextEntry1] : 23 y/o Menses 1/5, 1/24, 2/17, 3/24\par duration of menses 6 days\par [place] gastric [place] and continues to losse weight Pt 5ft 5 and is 108 lbs\par Her surgeon [place] advised her not to loos any more weight, cody states challenging to maintain/stablize her weight, advised to f/u with PCP and a barriatric provider, refrral given\par Reviewed menstrual history and WNL after initial irregularity in January\par Hormone labs WNL and pelvic us WNL, results discussed with cody\par Recommend tracking menses, not starting meds to regualate cycle at this point, focus on stablizing weight.

## 2023-04-10 LAB — SARS-COV-2 N GENE NPH QL NAA+PROBE: NOT DETECTED

## 2023-04-11 ENCOUNTER — TRANSCRIPTION ENCOUNTER (OUTPATIENT)
Age: 23
End: 2023-04-11

## 2023-04-12 ENCOUNTER — OUTPATIENT (OUTPATIENT)
Dept: OUTPATIENT SERVICES | Facility: HOSPITAL | Age: 23
LOS: 1 days | Discharge: HOME | End: 2023-04-12
Payer: COMMERCIAL

## 2023-04-12 ENCOUNTER — APPOINTMENT (OUTPATIENT)
Dept: OTOLARYNGOLOGY | Facility: AMBULATORY SURGERY CENTER | Age: 23
End: 2023-04-12

## 2023-04-12 ENCOUNTER — TRANSCRIPTION ENCOUNTER (OUTPATIENT)
Age: 23
End: 2023-04-12

## 2023-04-12 VITALS — DIASTOLIC BLOOD PRESSURE: 72 MMHG | OXYGEN SATURATION: 99 % | SYSTOLIC BLOOD PRESSURE: 107 MMHG | HEART RATE: 70 BPM

## 2023-04-12 VITALS
SYSTOLIC BLOOD PRESSURE: 116 MMHG | WEIGHT: 108.03 LBS | RESPIRATION RATE: 16 BRPM | OXYGEN SATURATION: 100 % | HEIGHT: 65 IN | DIASTOLIC BLOOD PRESSURE: 78 MMHG | HEART RATE: 60 BPM | TEMPERATURE: 99 F

## 2023-04-12 DIAGNOSIS — H90.12 CONDUCTIVE HEARING LOSS, UNILATERAL, LEFT EAR, WITH UNRESTRICTED HEARING ON THE CONTRALATERAL SIDE: ICD-10-CM

## 2023-04-12 DIAGNOSIS — Z90.3 ACQUIRED ABSENCE OF STOMACH [PART OF]: Chronic | ICD-10-CM

## 2023-04-12 DIAGNOSIS — M26.02 MAXILLARY HYPOPLASIA: Chronic | ICD-10-CM

## 2023-04-12 DIAGNOSIS — Z98.890 OTHER SPECIFIED POSTPROCEDURAL STATES: Chronic | ICD-10-CM

## 2023-04-12 PROCEDURE — 15770 DERMA-FAT-FASCIA GRAFT: CPT

## 2023-04-12 PROCEDURE — 69631 REPAIR EARDRUM STRUCTURES: CPT | Mod: RT

## 2023-04-12 PROCEDURE — 69310 REBUILD OUTER EAR CANAL: CPT

## 2023-04-12 DEVICE — SHTXOMED 0.005X13: Type: IMPLANTABLE DEVICE | Site: RIGHT | Status: FUNCTIONAL

## 2023-04-12 DEVICE — SURGIFOAM PAD 8CM X 12.5CM X 2MM (100C): Type: IMPLANTABLE DEVICE | Site: RIGHT | Status: FUNCTIONAL

## 2023-04-12 NOTE — ASU DISCHARGE PLAN (ADULT/PEDIATRIC) - ASU DC SPECIAL INSTRUCTIONSFT
tympanoplasty postop instructions.  Rx amoxicillin x 7 days, mupirocin once daily x 7 days, floxin drops 4-5 drops 2x/daily until first postoperative visit, oxycodone prn  F/u Dr. Angel 2 weeks

## 2023-04-12 NOTE — ASU PREOP CHECKLIST - ORDERS/MEDICATION ADMINISTRATION RECORD ON CHART
ucg negative, lactated ringers 30mL/hr/done ucg negative, lactated ringers 30mL/hr, Emend 40 mg po, oxycodone 5 mg po/done

## 2023-04-12 NOTE — ASU PREOPERATIVE ASSESSMENT, ADULT ([facility] ONLY) - FALL HARM RISK - UNIVERSAL INTERVENTIONS
Bed in lowest position, wheels locked, appropriate side rails in place/Call bell, personal items and telephone in reach/Instruct patient to call for assistance before getting out of bed or chair/Non-slip footwear when patient is out of bed/[place] to call system/Physically safe environment - no spills, clutter or unnecessary equipment/Purposeful Proactive Rounding/Room/bathroom lighting operational, light cord in reach

## 2023-04-20 ENCOUNTER — APPOINTMENT (OUTPATIENT)
Dept: OTOLARYNGOLOGY | Facility: CLINIC | Age: 23
End: 2023-04-20
Payer: COMMERCIAL

## 2023-04-20 PROCEDURE — 99024 POSTOP FOLLOW-UP VISIT: CPT

## 2023-04-20 NOTE — ASSESSMENT
[FreeTextEntry1] : Exam today shows widely patent meatoplasty, postauricular incision healing well, Gelfoam removed from right ear canal, right tympanic membrane intact.  Bilateral facial nerve function normal.\par \par Continue drops for another week, follow-up 1 week for reassessment.  Patient noted subjective improvement in hearing and reduction in tinnitus following packing removal.  No evidence of postoperative infection and provided patient reassurance.

## 2023-04-20 NOTE — HISTORY OF PRESENT ILLNESS
Left message to call back for: medication request  Information to relay to patient:  Below message      [de-identified] : 23 y/o F presents for first post operative visit\par s/p right meatoplasty, right bony canaloplasty, tissue graft 4/12/23\par patient complaining of increased tinnitus, otalgia, and bloody discharge requiring her to change cotton in ear 5-6 times daily

## 2023-04-26 ENCOUNTER — APPOINTMENT (OUTPATIENT)
Dept: INTERNAL MEDICINE | Facility: CLINIC | Age: 23
End: 2023-04-26
Payer: COMMERCIAL

## 2023-04-26 ENCOUNTER — APPOINTMENT (OUTPATIENT)
Dept: OTOLARYNGOLOGY | Facility: CLINIC | Age: 23
End: 2023-04-26
Payer: COMMERCIAL

## 2023-04-26 VITALS
DIASTOLIC BLOOD PRESSURE: 75 MMHG | HEART RATE: 86 BPM | BODY MASS INDEX: 17.49 KG/M2 | WEIGHT: 105 LBS | HEIGHT: 65 IN | SYSTOLIC BLOOD PRESSURE: 119 MMHG | TEMPERATURE: 97.8 F | RESPIRATION RATE: 18 BRPM | OXYGEN SATURATION: 100 %

## 2023-04-26 VITALS
SYSTOLIC BLOOD PRESSURE: 100 MMHG | BODY MASS INDEX: 17.49 KG/M2 | OXYGEN SATURATION: 99 % | DIASTOLIC BLOOD PRESSURE: 70 MMHG | WEIGHT: 105 LBS | HEIGHT: 65 IN | HEART RATE: 62 BPM

## 2023-04-26 DIAGNOSIS — H61.23 IMPACTED CERUMEN, BILATERAL: ICD-10-CM

## 2023-04-26 DIAGNOSIS — N64.4 MASTODYNIA: ICD-10-CM

## 2023-04-26 DIAGNOSIS — H01.00A UNSPECIFIED BLEPHARITIS RIGHT EYE, UPPER AND LOWER EYELIDS: ICD-10-CM

## 2023-04-26 DIAGNOSIS — Z87.2 PERSONAL HISTORY OF DISEASES OF THE SKIN AND SUBCUTANEOUS TISSUE: ICD-10-CM

## 2023-04-26 DIAGNOSIS — H72.02 CENTRAL PERFORATION OF TYMPANIC MEMBRANE, LEFT EAR: ICD-10-CM

## 2023-04-26 DIAGNOSIS — R07.0 PAIN IN THROAT: ICD-10-CM

## 2023-04-26 DIAGNOSIS — H04.122 DRY EYE SYNDROME OF LEFT LACRIMAL GLAND: ICD-10-CM

## 2023-04-26 DIAGNOSIS — H60.391 OTHER INFECTIVE OTITIS EXTERNA, RIGHT EAR: ICD-10-CM

## 2023-04-26 DIAGNOSIS — H92.11 OTORRHEA, RIGHT EAR: ICD-10-CM

## 2023-04-26 DIAGNOSIS — Z92.29 PERSONAL HISTORY OF OTHER DRUG THERAPY: ICD-10-CM

## 2023-04-26 DIAGNOSIS — R21 RASH AND OTHER NONSPECIFIC SKIN ERUPTION: ICD-10-CM

## 2023-04-26 DIAGNOSIS — Z86.19 PERSONAL HISTORY OF OTHER INFECTIOUS AND PARASITIC DISEASES: ICD-10-CM

## 2023-04-26 DIAGNOSIS — Z02.0 ENCOUNTER FOR EXAMINATION FOR ADMISSION TO EDUCATIONAL INSTITUTION: ICD-10-CM

## 2023-04-26 DIAGNOSIS — M79.645 PAIN IN LEFT FINGER(S): ICD-10-CM

## 2023-04-26 DIAGNOSIS — L02.411 CUTANEOUS ABSCESS OF RIGHT AXILLA: ICD-10-CM

## 2023-04-26 DIAGNOSIS — K21.9 GASTRO-ESOPHAGEAL REFLUX DISEASE W/OUT ESOPHAGITIS: ICD-10-CM

## 2023-04-26 DIAGNOSIS — Z87.898 PERSONAL HISTORY OF OTHER SPECIFIED CONDITIONS: ICD-10-CM

## 2023-04-26 DIAGNOSIS — H93.8X9 OTHER SPECIFIED DISORDERS OF EAR, UNSPECIFIED EAR: ICD-10-CM

## 2023-04-26 DIAGNOSIS — Z82.49 FAMILY HISTORY OF ISCHEMIC HEART DISEASE AND OTHER DISEASES OF THE CIRCULATORY SYSTEM: ICD-10-CM

## 2023-04-26 DIAGNOSIS — Z87.09 PERSONAL HISTORY OF OTHER DISEASES OF THE RESPIRATORY SYSTEM: ICD-10-CM

## 2023-04-26 DIAGNOSIS — N89.8 OTHER SPECIFIED NONINFLAMMATORY DISORDERS OF VAGINA: ICD-10-CM

## 2023-04-26 DIAGNOSIS — J01.10 ACUTE FRONTAL SINUSITIS, UNSPECIFIED: ICD-10-CM

## 2023-04-26 DIAGNOSIS — Z11.1 ENCOUNTER FOR SCREENING FOR RESPIRATORY TUBERCULOSIS: ICD-10-CM

## 2023-04-26 DIAGNOSIS — R09.82 POSTNASAL DRIP: ICD-10-CM

## 2023-04-26 DIAGNOSIS — E78.5 HYPERLIPIDEMIA, UNSPECIFIED: ICD-10-CM

## 2023-04-26 DIAGNOSIS — T17.1XXS FOREIGN BODY IN NOSTRIL, SEQUELA: ICD-10-CM

## 2023-04-26 DIAGNOSIS — M25.531 PAIN IN RIGHT WRIST: ICD-10-CM

## 2023-04-26 DIAGNOSIS — Z87.440 PERSONAL HISTORY OF URINARY (TRACT) INFECTIONS: ICD-10-CM

## 2023-04-26 DIAGNOSIS — Z23 ENCOUNTER FOR IMMUNIZATION: ICD-10-CM

## 2023-04-26 DIAGNOSIS — H60.8X2 OTHER OTITIS EXTERNA, LEFT EAR: ICD-10-CM

## 2023-04-26 DIAGNOSIS — N76.3 SUBACUTE AND CHRONIC VULVITIS: ICD-10-CM

## 2023-04-26 DIAGNOSIS — H93.292 OTHER ABNORMAL AUDITORY PERCEPTIONS, LEFT EAR: ICD-10-CM

## 2023-04-26 DIAGNOSIS — Z87.730 PERSONAL HISTORY OF (CORRECTED) CLEFT LIP AND PALATE: ICD-10-CM

## 2023-04-26 DIAGNOSIS — Z01.818 ENCOUNTER FOR OTHER PREPROCEDURAL EXAMINATION: ICD-10-CM

## 2023-04-26 DIAGNOSIS — Z01.00 ENCOUNTER FOR EXAMINATION OF EYES AND VISION W/OUT ABNORMAL FINDINGS: ICD-10-CM

## 2023-04-26 DIAGNOSIS — H61.301 ACQUIRED STENOSIS OF RIGHT EXTERNAL EAR CANAL, UNSPECIFIED: ICD-10-CM

## 2023-04-26 DIAGNOSIS — E66.9 OBESITY, UNSPECIFIED: ICD-10-CM

## 2023-04-26 DIAGNOSIS — L50.9 URTICARIA, UNSPECIFIED: ICD-10-CM

## 2023-04-26 DIAGNOSIS — L02.419 CUTANEOUS ABSCESS OF LIMB, UNSPECIFIED: ICD-10-CM

## 2023-04-26 PROCEDURE — 99395 PREV VISIT EST AGE 18-39: CPT | Mod: 25

## 2023-04-26 PROCEDURE — 99024 POSTOP FOLLOW-UP VISIT: CPT

## 2023-04-26 PROCEDURE — 36415 COLL VENOUS BLD VENIPUNCTURE: CPT

## 2023-04-26 RX ORDER — FAMOTIDINE 40 MG/1
40 TABLET, FILM COATED ORAL
Qty: 90 | Refills: 0 | Status: DISCONTINUED | COMMUNITY
Start: 2022-01-27 | End: 2023-04-26

## 2023-04-26 RX ORDER — AMOXICILLIN AND CLAVULANATE POTASSIUM 875; 125 MG/1; MG/1
875-125 TABLET, COATED ORAL
Qty: 14 | Refills: 0 | Status: DISCONTINUED | COMMUNITY
Start: 2023-02-22 | End: 2023-04-26

## 2023-04-26 RX ORDER — OMEPRAZOLE 40 MG/1
40 CAPSULE, DELAYED RELEASE ORAL
Qty: 90 | Refills: 1 | Status: DISCONTINUED | COMMUNITY
Start: 2022-01-27 | End: 2023-04-26

## 2023-04-26 RX ORDER — CLOTRIMAZOLE AND BETAMETHASONE DIPROPIONATE 10; .5 MG/G; MG/G
1-0.05 CREAM TOPICAL TWICE DAILY
Qty: 1 | Refills: 2 | Status: DISCONTINUED | COMMUNITY
Start: 2021-12-17 | End: 2023-04-26

## 2023-04-26 RX ORDER — NITROFURANTOIN (MONOHYDRATE/MACROCRYSTALS) 25; 75 MG/1; MG/1
100 CAPSULE ORAL
Qty: 14 | Refills: 0 | Status: DISCONTINUED | COMMUNITY
Start: 2022-07-27 | End: 2023-04-26

## 2023-04-26 RX ORDER — AMOXICILLIN 875 MG/1
875 TABLET, FILM COATED ORAL
Qty: 14 | Refills: 0 | Status: DISCONTINUED | COMMUNITY
Start: 2023-04-12 | End: 2023-04-26

## 2023-04-26 RX ORDER — FLUCONAZOLE 150 MG/1
150 TABLET ORAL
Qty: 1 | Refills: 1 | Status: DISCONTINUED | COMMUNITY
Start: 2023-02-14 | End: 2023-04-26

## 2023-04-26 RX ORDER — MUPIROCIN 20 MG/G
2 OINTMENT TOPICAL
Qty: 1 | Refills: 1 | Status: DISCONTINUED | COMMUNITY
Start: 2023-04-12 | End: 2023-04-26

## 2023-04-26 RX ORDER — CEPHALEXIN 500 MG/1
500 TABLET ORAL
Qty: 28 | Refills: 0 | Status: DISCONTINUED | COMMUNITY
Start: 2022-10-14 | End: 2023-04-26

## 2023-04-26 RX ORDER — KETOCONAZOLE 20.5 MG/ML
2 SHAMPOO, SUSPENSION TOPICAL
Qty: 1 | Refills: 11 | Status: DISCONTINUED | COMMUNITY
Start: 2023-04-05 | End: 2023-04-26

## 2023-04-26 RX ORDER — METRONIDAZOLE 7.5 MG/G
0.75 GEL VAGINAL
Qty: 1 | Refills: 1 | Status: DISCONTINUED | COMMUNITY
Start: 2023-02-21 | End: 2023-04-26

## 2023-04-26 RX ORDER — OXYCODONE 5 MG/1
5 TABLET ORAL
Qty: 8 | Refills: 0 | Status: DISCONTINUED | COMMUNITY
Start: 2023-04-12 | End: 2023-04-26

## 2023-04-26 RX ORDER — CHLORHEXIDINE GLUCONATE 213 G/1000ML
4 SOLUTION TOPICAL
Qty: 1 | Refills: 5 | Status: DISCONTINUED | COMMUNITY
Start: 2021-04-27 | End: 2023-04-26

## 2023-04-26 RX ORDER — PHENTERMINE HYDROCHLORIDE 37.5 MG/1
37.5 TABLET ORAL
Qty: 30 | Refills: 0 | Status: DISCONTINUED | COMMUNITY
Start: 2022-04-25 | End: 2023-04-26

## 2023-04-26 RX ORDER — OFLOXACIN OTIC 3 MG/ML
0.3 SOLUTION AURICULAR (OTIC)
Qty: 1 | Refills: 1 | Status: DISCONTINUED | COMMUNITY
Start: 2023-04-12 | End: 2023-04-26

## 2023-04-26 RX ORDER — CLINDAMYCIN PHOSPHATE 10 MG/ML
1 LOTION TOPICAL TWICE DAILY
Qty: 1 | Refills: 5 | Status: DISCONTINUED | COMMUNITY
Start: 2021-04-27 | End: 2023-04-26

## 2023-04-26 NOTE — HEALTH RISK ASSESSMENT
[Good] : ~his/her~  mood as  good [No] : In the past 12 months have you used drugs other than those required for medical reasons? No [0] : 2) Feeling down, depressed, or hopeless: Not at all (0) [PHQ-2 Negative - No further assessment needed] : PHQ-2 Negative - No further assessment needed [HIV test declined] : HIV test declined [Hepatitis C test declined] : Hepatitis C test declined [None] : None [With Family] : lives with family [Single] : single [Feels Safe at Home] : Feels safe at home [Fully functional (bathing, dressing, toileting, transferring, walking, feeding)] : Fully functional (bathing, dressing, toileting, transferring, walking, feeding) [Fully functional (using the telephone, shopping, preparing meals, housekeeping, doing laundry, using] : Fully functional and needs no help or supervision to perform IADLs (using the telephone, shopping, preparing meals, housekeeping, doing laundry, using transportation, managing medications and managing finances) [With Patient/Caregiver] : , with patient/caregiver [Reviewed no changes] : Reviewed, no changes [Never (0 pts)] : Never (0 points) [Relationship: ___] : Relationship: [unfilled] [Audit-CScore] : 0 [de-identified] : none [BKB4Cnemn] : 0 [Change in mental status noted] : No change in mental status noted [Language] : denies difficulty with language [Sexually Active] : not sexually active [High Risk Behavior] : no high risk behavior [Reports changes in hearing] : Reports no changes in hearing [Reports changes in vision] : Reports no changes in vision [Reports changes in dental health] : Reports no changes in dental health [Smoke Detector] : no smoke detector [Seat Belt] : does not use seat belt [MammogramComments] : breast exam was done by GYN 2/14/23.  US breast 4/6/22 -nl [de-identified] : wears glasses- seen optom 06/2022 [de-identified] : seen Dentist 08/2022 [AdvancecareDate] : 04/23

## 2023-04-26 NOTE — PHYSICAL EXAM
[No Acute Distress] : no acute distress [Well Nourished] : well nourished [Well Developed] : well developed [Well-Appearing] : well-appearing [Normal Sclera/Conjunctiva] : normal sclera/conjunctiva [PERRL] : pupils equal round and reactive to light [Normal Outer Ear/Nose] : the outer ears and nose were normal in appearance [Normal Oropharynx] : the oropharynx was normal [No Lymphadenopathy] : no lymphadenopathy [Supple] : supple [Thyroid Normal, No Nodules] : the thyroid was normal and there were no nodules present [No Respiratory Distress] : no respiratory distress  [No Accessory Muscle Use] : no accessory muscle use [Clear to Auscultation] : lungs were clear to auscultation bilaterally [Normal Rate] : normal rate  [Regular Rhythm] : with a regular rhythm [Normal S1, S2] : normal S1 and S2 [No Murmur] : no murmur heard [Pedal Pulses Present] : the pedal pulses are present [No Edema] : there was no peripheral edema [Soft] : abdomen soft [Non Tender] : non-tender [Non-distended] : non-distended [Normal Bowel Sounds] : normal bowel sounds [Normal Supraclavicular Nodes] : no supraclavicular lymphadenopathy [Normal Axillary Nodes] : no axillary lymphadenopathy [Normal Posterior Cervical Nodes] : no posterior cervical lymphadenopathy [Normal Anterior Cervical Nodes] : no anterior cervical lymphadenopathy [Normal Inguinal Nodes] : no inguinal lymphadenopathy [Grossly Normal Strength/Tone] : grossly normal strength/tone [No Focal Deficits] : no focal deficits [Normal Gait] : normal gait [Normal Affect] : the affect was normal [Normal Insight/Judgement] : insight and judgment were intact [Normal Appearance] : normal in appearance [No Masses] : no palpable masses [No Axillary Lymphadenopathy] : no axillary lymphadenopathy [de-identified] : no tenderness

## 2023-04-26 NOTE — REASON FOR VISIT
[Subsequent Evaluation] : a subsequent evaluation for [FreeTextEntry2] : s/p right meatoplasty, right bony canaloplasty, tissue graft 4/12/23

## 2023-04-26 NOTE — HISTORY OF PRESENT ILLNESS
[FreeTextEntry1] : CPE [de-identified] : vaccine- rec COVID booster\par diet- healthy diet reviewed\par exercise=-no\par hx of gastric sleeve 8/2022 done in [place]\par reviewed 4/20/23 SILVINA for conductive hearing loss\par urticaria- no sympt for 2 mo reviewed 4/5/23 Derm notes\par seen GYN sev times this year\par GERD- 2 /day- ran out of PPI\par post nasal drip-2 yrs being f/u by ENT as per pt\par mastalgia- intermittent- started 6 mo- worse over past 2mo.  last episode of pain 1 wk ago- occurs only L nipple when taking a shower\par

## 2023-04-26 NOTE — HISTORY OF PRESENT ILLNESS
[de-identified] : 22 year old female presents with s/p right meatoplasty, right bony canaloplasty, tissue graft 4/12/23\par Reports right ear discomfort, clogged and tinnitus \par Denies otorrhea, bleeding, itch, visual changes, dizziness, headaches or fevers

## 2023-04-26 NOTE — ASSESSMENT
[FreeTextEntry1] : Exam today shows widely patent right ear canal after packing removal.  Right tympanic membrane intact without effusion or retraction.\par \par Topical ciprofloxacin/dexamethasone/clotrimazole/boric acid powder applied to right ear canal followed by topical betamethasone cream along edges of granulation.  Follow-up 2 weeks for reassessment, maintain dry ear precautions.  Would only restart Floxin drops if patient develops foul-smelling drainage in the interim.

## 2023-04-26 NOTE — PLAN
[FreeTextEntry1] : hx of gastric sleeve- rec MVI, rec f/u w her surgeon\par mastalgia, irreg period, ovarian cyst- f/u w GYN\par has appt for ENT today\par Blood was collected in the office.\par

## 2023-05-02 ENCOUNTER — TRANSCRIPTION ENCOUNTER (OUTPATIENT)
Age: 23
End: 2023-05-02

## 2023-05-02 LAB
25(OH)D3 SERPL-MCNC: 28.4 NG/ML
ALBUMIN SERPL ELPH-MCNC: 4.4 G/DL
ALP BLD-CCNC: 72 U/L
ALT SERPL-CCNC: 10 U/L
ANION GAP SERPL CALC-SCNC: 14 MMOL/L
AST SERPL-CCNC: 15 U/L
BASOPHILS # BLD AUTO: 0.04 K/UL
BASOPHILS NFR BLD AUTO: 0.8 %
BILIRUB SERPL-MCNC: 0.6 MG/DL
BUN SERPL-MCNC: 12 MG/DL
CALCIUM SERPL-MCNC: 9.7 MG/DL
CHLORIDE SERPL-SCNC: 105 MMOL/L
CHOLEST SERPL-MCNC: 173 MG/DL
CO2 SERPL-SCNC: 24 MMOL/L
CREAT SERPL-MCNC: 0.75 MG/DL
EGFR: 115 ML/MIN/1.73M2
EOSINOPHIL # BLD AUTO: 0.05 K/UL
EOSINOPHIL NFR BLD AUTO: 1.1 %
FERRITIN SERPL-MCNC: 25 NG/ML
GLUCOSE SERPL-MCNC: 89 MG/DL
HCT VFR BLD CALC: 42.1 %
HDLC SERPL-MCNC: 55 MG/DL
HGB BLD-MCNC: 12.8 G/DL
HOMOCYSTEINE LEVEL: 9.8 UMOL/L
IMM GRANULOCYTES NFR BLD AUTO: 0.2 %
IRON SATN MFR SERPL: 16 %
IRON SERPL-MCNC: 58 UG/DL
LDLC SERPL CALC-MCNC: 100 MG/DL
LDLC SERPL DIRECT ASSAY-MCNC: 103 MG/DL
LYMPHOCYTES # BLD AUTO: 1.39 K/UL
LYMPHOCYTES NFR BLD AUTO: 29.4 %
MAN DIFF?: NORMAL
MCHC RBC-ENTMCNC: 28.4 PG
MCHC RBC-ENTMCNC: 30.4 GM/DL
MCV RBC AUTO: 93.6 FL
METHYLMALONIC ACID LEVEL: 168 NMOL/L
MONOCYTES # BLD AUTO: 0.28 K/UL
MONOCYTES NFR BLD AUTO: 5.9 %
NEUTROPHILS # BLD AUTO: 2.96 K/UL
NEUTROPHILS NFR BLD AUTO: 62.6 %
NONHDLC SERPL-MCNC: 118 MG/DL
PLATELET # BLD AUTO: 276 K/UL
POTASSIUM SERPL-SCNC: 4 MMOL/L
PROT SERPL-MCNC: 6.9 G/DL
RBC # BLD: 4.5 M/UL
RBC # FLD: 12.9 %
SODIUM SERPL-SCNC: 143 MMOL/L
T3 SERPL-MCNC: 100 NG/DL
T4 FREE SERPL-MCNC: 1.3 NG/DL
TIBC SERPL-MCNC: 361 UG/DL
TRIGL SERPL-MCNC: 90 MG/DL
TSH SERPL-ACNC: 0.87 UIU/ML
UIBC SERPL-MCNC: 303 UG/DL
VIT B12 SERPL-MCNC: 317 PG/ML
WBC # FLD AUTO: 4.73 K/UL

## 2023-05-11 ENCOUNTER — APPOINTMENT (OUTPATIENT)
Dept: ULTRASOUND IMAGING | Facility: IMAGING CENTER | Age: 23
End: 2023-05-11
Payer: COMMERCIAL

## 2023-05-11 ENCOUNTER — APPOINTMENT (OUTPATIENT)
Dept: OTOLARYNGOLOGY | Facility: CLINIC | Age: 23
End: 2023-05-11
Payer: COMMERCIAL

## 2023-05-11 ENCOUNTER — OUTPATIENT (OUTPATIENT)
Dept: OUTPATIENT SERVICES | Facility: HOSPITAL | Age: 23
LOS: 1 days | End: 2023-05-11
Payer: COMMERCIAL

## 2023-05-11 ENCOUNTER — RESULT REVIEW (OUTPATIENT)
Age: 23
End: 2023-05-11

## 2023-05-11 VITALS
DIASTOLIC BLOOD PRESSURE: 77 MMHG | SYSTOLIC BLOOD PRESSURE: 114 MMHG | HEART RATE: 61 BPM | HEIGHT: 65 IN | BODY MASS INDEX: 17.99 KG/M2 | WEIGHT: 108 LBS

## 2023-05-11 DIAGNOSIS — Z98.890 OTHER SPECIFIED POSTPROCEDURAL STATES: Chronic | ICD-10-CM

## 2023-05-11 DIAGNOSIS — Z00.8 ENCOUNTER FOR OTHER GENERAL EXAMINATION: ICD-10-CM

## 2023-05-11 PROCEDURE — 99024 POSTOP FOLLOW-UP VISIT: CPT

## 2023-05-11 PROCEDURE — 76641 ULTRASOUND BREAST COMPLETE: CPT | Mod: 26,50

## 2023-05-11 PROCEDURE — 76641 ULTRASOUND BREAST COMPLETE: CPT

## 2023-05-11 NOTE — ASSESSMENT
[FreeTextEntry1] : Presents for second postoperative visit status post right meatoplasty.  No drainage since last visit.  Exam today shows well epithelialized right ear canal, well-healed meatoplasty site.  Minor debridement performed, topical ciprofloxacin/dexamethasone/clotrimazole/boric acid powder applied along with topical betamethasone cream.  Maintain dry ear precautions, follow-up 1 month.

## 2023-05-11 NOTE — HISTORY OF PRESENT ILLNESS
[de-identified] : 22 year old woman, postoperative follow up s/p Right meatoplasty, right bony canaloplasty, tissue graft, use of operative  microscope 4/12/23\par History of Right ear external canal stenosis, right recurring otorrhea,  right chronic otitis externa\par Packing removed - topical mastoid powder used\par Recommended to maintain dry ear precautions\par Reports doing well postoperatively - hearing is unchanged, but doing well overall\par States Left ear still symptomatic - hearing impaired\par Denies otalgia, otorrhea, tinnitus, dizziness, vertigo, headaches related to ears, recent fevers or chills

## 2023-05-15 ENCOUNTER — APPOINTMENT (OUTPATIENT)
Dept: OTOLARYNGOLOGY | Facility: CLINIC | Age: 23
End: 2023-05-15

## 2023-05-15 NOTE — H&P PST ADULT - CARDIOVASCULAR
Prescription Refill     Medication Name:  [facility] 5mg  [facility] [address] [place] [address], [address] [place] [address] [phone]  Patient Contact Number:  [phone] negative normal/regular rate and rhythm/S1 S2 present/no gallops/no rub/no murmur

## 2023-06-08 ENCOUNTER — APPOINTMENT (OUTPATIENT)
Dept: GASTROENTEROLOGY | Facility: CLINIC | Age: 23
End: 2023-06-08

## 2023-06-09 ENCOUNTER — APPOINTMENT (OUTPATIENT)
Dept: OTOLARYNGOLOGY | Facility: CLINIC | Age: 23
End: 2023-06-09
Payer: COMMERCIAL

## 2023-06-09 DIAGNOSIS — H92.12 OTORRHEA, LEFT EAR: ICD-10-CM

## 2023-06-09 PROCEDURE — 99024 POSTOP FOLLOW-UP VISIT: CPT

## 2023-06-09 NOTE — ASSESSMENT
[FreeTextEntry1] : Reports no pain or drainage from either ear since last visit.  Exam today shows well-healed right meatoplasty, no active drainage, minor crusting removed.  Left ear with minimal cerumen accumulation.\par \par Topical ciprofloxacin/dexamethasone/clotrimazole/boric acid powder applied to right ear once more.  Maintain dry ear precautions, follow-up 2 months.

## 2023-06-09 NOTE — HISTORY OF PRESENT ILLNESS
[de-identified] : 22 year old female presents for post op visit.\par S/p Right meatoplasty, right bony canaloplasty, tissue graft, use of operative  microscope on 04/12/23\par Hearing stable from last visit in May.\par Patient denies otalgia, otorrhea, ear infections, tinnitus, dizziness, vertigo, headaches related to hearing.

## 2023-06-09 NOTE — REASON FOR VISIT
[Post-Operative Visit] : a post-operative visit [FreeTextEntry2] : Right meatoplasty, right bony canaloplasty, tissue graft, use of operative  microscope 04/12/23

## 2023-07-11 ENCOUNTER — APPOINTMENT (OUTPATIENT)
Dept: INTERNAL MEDICINE | Facility: CLINIC | Age: 23
End: 2023-07-11

## 2023-07-22 ENCOUNTER — RX RENEWAL (OUTPATIENT)
Age: 23
End: 2023-07-22

## 2023-08-07 ENCOUNTER — APPOINTMENT (OUTPATIENT)
Dept: DERMATOLOGY | Facility: CLINIC | Age: 23
End: 2023-08-07

## 2023-08-09 ENCOUNTER — LABORATORY RESULT (OUTPATIENT)
Age: 23
End: 2023-08-09

## 2023-08-10 ENCOUNTER — APPOINTMENT (OUTPATIENT)
Dept: INTERNAL MEDICINE | Facility: CLINIC | Age: 23
End: 2023-08-10
Payer: MEDICAID

## 2023-08-10 VITALS
WEIGHT: 110 LBS | HEIGHT: 65 IN | DIASTOLIC BLOOD PRESSURE: 82 MMHG | HEART RATE: 69 BPM | SYSTOLIC BLOOD PRESSURE: 118 MMHG | OXYGEN SATURATION: 99 % | BODY MASS INDEX: 18.33 KG/M2

## 2023-08-10 PROCEDURE — 99213 OFFICE O/P EST LOW 20 MIN: CPT | Mod: 25

## 2023-08-10 PROCEDURE — 36415 COLL VENOUS BLD VENIPUNCTURE: CPT

## 2023-08-19 LAB
M TB IFN-G BLD-IMP: NEGATIVE
QUANTIFERON TB PLUS MITOGEN MINUS NIL: 2.73 IU/ML
QUANTIFERON TB PLUS NIL: 0.01 IU/ML
QUANTIFERON TB PLUS TB1 MINUS NIL: 0.01 IU/ML
QUANTIFERON TB PLUS TB2 MINUS NIL: 0.01 IU/ML

## 2023-08-30 ENCOUNTER — NON-APPOINTMENT (OUTPATIENT)
Age: 23
End: 2023-08-30

## 2023-09-07 ENCOUNTER — APPOINTMENT (OUTPATIENT)
Age: 23
End: 2023-09-07
Payer: MEDICAID

## 2023-09-07 VITALS
DIASTOLIC BLOOD PRESSURE: 80 MMHG | HEIGHT: 65 IN | HEART RATE: 78 BPM | OXYGEN SATURATION: 97 % | SYSTOLIC BLOOD PRESSURE: 115 MMHG

## 2023-09-07 VITALS — WEIGHT: 106 LBS | BODY MASS INDEX: 17.64 KG/M2

## 2023-09-07 DIAGNOSIS — Z83.438 FAMILY HISTORY OF OTHER DISORDER OF LIPOPROTEIN METABOLISM AND OTHER LIPIDEMIA: ICD-10-CM

## 2023-09-07 DIAGNOSIS — Z56.0 UNEMPLOYMENT, UNSPECIFIED: ICD-10-CM

## 2023-09-07 PROCEDURE — 99243 OFF/OP CNSLTJ NEW/EST LOW 30: CPT

## 2023-09-07 SDOH — ECONOMIC STABILITY - INCOME SECURITY: UNEMPLOYMENT, UNSPECIFIED: Z56.0

## 2023-09-07 NOTE — CONSULT LETTER
[Dear  ___] : Dear  [unfilled], [Consult Letter:] : I had the pleasure of evaluating your patient, [unfilled]. [Consult Closing:] : Thank you very much for allowing me to participate in the care of this patient.  If you have any questions, please do not hesitate to contact me. [Sincerely,] : Sincerely, [FreeTextEntry3] : Dr Uriarte

## 2023-09-07 NOTE — HISTORY OF PRESENT ILLNESS
[de-identified] : CARRIE MILLER is a 22 year old female who present in the office for initial consultation who was referred by Arpita Grimes with the chief complaint of losing a lot of weight post Sleeve Gastrectomy. Patient stated that her surgery was done in [facility] in August 2022 preop weight was 187lb and today her weight is 106lb, she lost 81lb since the surgery. She denies any vomiting, stating that last time she vomited was 6 months ago. Patient denies any eating disorder before bariatric surgery. Ms. MILLER stated that she is seeing a psychiatrist for due to filling depressed and thinking that she is to skinny.

## 2023-09-07 NOTE — PHYSICAL EXAM
[Normal] : affect appropriate [de-identified] : Normoactive bowel sounds, soft and nontender, no hepatosplenomegaly or masses noted, well healed scars

## 2023-09-07 NOTE — PLAN
[FreeTextEntry1] : Please follow up at the office in 3 month and as needed for any questions or concerns.

## 2023-09-07 NOTE — ASSESSMENT
[FreeTextEntry1] : CARRIE MILLER is a 22 year old female with BMI 17.64 kg/m2 with PSHx of Sleeve Gastrectomy done in [facility] in August 2022.   Patient was advised to follow up with psychiatrist for management of depression  Upper GI to evaluate the sleeve  [facility]  RTO in 3 month

## 2023-09-08 ENCOUNTER — EMERGENCY (EMERGENCY)
Facility: HOSPITAL | Age: 23
LOS: 1 days | Discharge: HOME | End: 2023-09-08
Attending: EMERGENCY MEDICINE | Admitting: EMERGENCY MEDICINE
Payer: MEDICAID

## 2023-09-08 VITALS
RESPIRATION RATE: 18 BRPM | HEART RATE: 88 BPM | OXYGEN SATURATION: 100 % | SYSTOLIC BLOOD PRESSURE: 133 MMHG | TEMPERATURE: 98 F | DIASTOLIC BLOOD PRESSURE: 84 MMHG

## 2023-09-08 DIAGNOSIS — Z98.890 OTHER SPECIFIED POSTPROCEDURAL STATES: Chronic | ICD-10-CM

## 2023-09-08 DIAGNOSIS — Z90.3 ACQUIRED ABSENCE OF STOMACH [PART OF]: Chronic | ICD-10-CM

## 2023-09-08 DIAGNOSIS — M26.02 MAXILLARY HYPOPLASIA: Chronic | ICD-10-CM

## 2023-09-08 LAB
25(OH)D3 SERPL-MCNC: 24.5 NG/ML
ALBUMIN SERPL ELPH-MCNC: 4.8 G/DL
ALP BLD-CCNC: 77 U/L
ALT SERPL-CCNC: 10 U/L
ANION GAP SERPL CALC-SCNC: 16 MMOL/L
APTT BLD: 26.8 SEC
AST SERPL-CCNC: 12 U/L
BILIRUB SERPL-MCNC: 0.8 MG/DL
BUN SERPL-MCNC: 12 MG/DL
CALCIUM SERPL-MCNC: 10.1 MG/DL
CALCIUM SERPL-MCNC: 10.1 MG/DL
CHLORIDE SERPL-SCNC: 99 MMOL/L
CHOLEST SERPL-MCNC: 146 MG/DL
CO2 SERPL-SCNC: 22 MMOL/L
CREAT SERPL-MCNC: 0.64 MG/DL
EGFR: 127 ML/MIN/1.73M2
ESTIMATED AVERAGE GLUCOSE: 120 MG/DL
FERRITIN SERPL-MCNC: 11 NG/ML
FOLATE SERPL-MCNC: 5.8 NG/ML
GLUCOSE SERPL-MCNC: 77 MG/DL
HBA1C MFR BLD HPLC: 5.8 %
HDLC SERPL-MCNC: 60 MG/DL
INR PPP: 1.03 RATIO
IRON SATN MFR SERPL: 7 %
IRON SERPL-MCNC: 30 UG/DL
LDLC SERPL CALC-MCNC: 71 MG/DL
NONHDLC SERPL-MCNC: 87 MG/DL
PARATHYROID HORMONE INTACT: 37 PG/ML
POTASSIUM SERPL-SCNC: 4.5 MMOL/L
PROT SERPL-MCNC: 6.9 G/DL
PT BLD: 11.6 SEC
SODIUM SERPL-SCNC: 137 MMOL/L
TIBC SERPL-MCNC: 446 UG/DL
TRIGL SERPL-MCNC: 80 MG/DL
TSH SERPL-ACNC: 1.05 UIU/ML
UIBC SERPL-MCNC: 416 UG/DL
VIT B12 SERPL-MCNC: 289 PG/ML

## 2023-09-08 PROCEDURE — 99284 EMERGENCY DEPT VISIT MOD MDM: CPT

## 2023-09-08 RX ADMIN — Medication 1 MILLIGRAM(S): at 05:26

## 2023-09-08 NOTE — ED PROVIDER NOTE - PATIENT PORTAL LINK FT
You can access the FollowMyHealth Patient Portal offered by [facility] by registering at the following website: [email or web address]. By joining [facility]’s FollowMyHealth portal, you will also be able to view your health information using other applications (apps) compatible with our system.

## 2023-09-08 NOTE — ED ADULT NURSE NOTE - NSFALLHARMRISKINTERV_ED_ALL_ED

## 2023-09-08 NOTE — ED PROVIDER NOTE - NSFOLLOWUPINSTRUCTIONS_ED_ALL_ED_FT
You have been given information necessary to follow up with the  [facility] ([facility]) Crisis center & other outpatient  psychiatric clinics within your community    • [facility] walk in Crisis centre  [address] [place] [address] [phone] [email or web address]  Hours of operation:  Day	                                        Hours  Sunday                                  Closed  Monday                                9am - 3pm  Tuesday                                9am - 3pm  Wednesday                          9am - 3pm  Thursday                               9am - 3pm  Friday                                    9am - 3pm  Saturday                                Closed    .....additionally if your current problem is associated with drug or alcohol abuse further information can be obtained at the Drug Abuse Evaluation Health Referral Servce (DAEHRS)    • [facility] [address] [place] [address] [phone] [email or web address]-service    Additionally if more support and information and help is needed in the area of suicide prevention pleas3 feel free to contact :   • [place] [address]  Phone: 8-899-723-EPHK (7362)  Web Address: http://www.suicidepreventionlifeline.org  • Suicide Awareness Voices of Education  [address]  [place], [place]  Phone: 1-144.270.5512  Web Address: http://www.save.org    Depression    WHAT YOU NEED TO KNOW:  Depression is a medical condition that causes feelings of sadness or hopelessness that do not go away. Depression may cause you to lose interest in things you used to enjoy. These feelings may interfere with your daily life.  DISCHARGE INSTRUCTIONS:  Call your local emergency number (911 in the [place]) if:   •You think about harming yourself or someone else.  •You have done something on purpose to hurt yourself.  Call your therapist or doctor if:   •Your symptoms do not improve.  •You cannot make it to your next appointment.   •You have new symptoms.  •You have questions or concerns about your condition or care.  The following resources are available at any time to help you, if needed:   •National Suicide Prevention Lifeline: 1-582.918.2476 (1-557-285-TALK)  •Suicide Hotline: 1-423.843.1731 (4-609-VAWCQQK)  •For a list of international numbers: https://save.org/find-help/international-resources/  Medicines:   •Antidepressants may be given to improve or balance your mood. You may need to take this medicine for several weeks before you begin to feel better.  •Take your medicine as directed. Contact your healthcare provider if you think your medicine is not helping or if you have side effects. Tell him of her if you are allergic to any medicine. Keep a list of the medicines, vitamins, and herbs you take. Include the amounts, and when and why you take them. Bring the list or the pill bottles to follow-up visits. Carry your medicine list with you in case of an emergency.  Therapy is often used together with medicine to relieve depression. Therapy is a way for you to talk about your feelings and anything that may be causing depression. Therapy can be done alone or in a group. It may also be done with family members or a significant other.    Self-care:   •Get regular physical activity. Try to be active for 30 minutes, 3 to 5 days a week. Physical activity can help relieve depression. Work with your healthcare provider to develop a plan that you enjoy. It may help to ask someone to be active with you.  •Create a regular sleep schedule. A routine can help you relax before bed. Listen to music, read, or do yoga. Try to go to bed and wake up at the same time every day. Sleep is important for emotional health.  •Eat a variety of healthy foods. Healthy foods include fruits, vegetables, whole-grain breads, low-fat dairy products, lean meats, fish, and cooked beans. A healthy meal plan is low in fat, salt, and added sugar.  •Do not drink alcohol or use drugs. Alcohol and drugs can make depression worse. Talk to your therapist or doctor if you need help quitting.  Follow up with your healthcare provider as directed: Your healthcare provider will monitor your progress at follow-up visits. He or she will also monitor your medicine if you take antidepressants. Your healthcare provider will ask if the medicine is helping. Tell him or her about any side effects or problems you may have with your medicine. The type or amount of medicine may need to be changed. Write down your questions so you remember to ask them during your visits.    Suicide Prevention  WHAT YOU NEED TO KNOW:  You may see suicide as the only way to escape emotional or physical pain and suffering. Help is available from people who care about you, and from professionals trained in suicide prevention. Prevention includes everything you and others can do to stop you from taking your life.  DISCHARGE INSTRUCTIONS:  Call your local emergency number (911 in the ), or ask someone to call if:   •You do something on purpose to hurt yourself  •You make a plan to commit suicide.  Call your doctor or therapist, or have someone close to you call if:   •You act out in anger, are reckless, or are abusing alcohol or drugs.  •You have serious thoughts of suicide, even with treatment.  •You have more thoughts of suicide when you are alone.  •You stop eating, or you begin to smoke cigarettes or drink alcohol.  •You have questions or concerns about your condition or care.  What to do if you are considering suicide:    •Contact a suicide prevention organization. The following are always available to help you: ?National Suicide Prevention Lifeline: 1-387.819.2512 (2-407-882-TALK)  ?Suicide Hotline: 1-265.123.4697 (3-958-ZPWDKJQ)  ?For a list of international numbers: https://save.org/find-help/international-resources/  •Contact your therapist. Your doctor can give you a list of therapists if you do not have one.  •Keep medicines, weapons, and alcohol out of your home.  •Do not spend time alone if you have thoughts of ending your life.  Warning signs of suicide: The following can help you and others recognize that you are struggling:   •Talking about your plan for committing suicide, or wanting to read or write about death or suicide  •Cutting yourself, burning your skin with cigarettes, or driving recklessly  •Drug or alcohol use, not taking your prescribed medicine, or taking too much  •Not wanting to spend time with others or doing things you enjoy, feeling bored, or not wanting anyone to praise you  •Changes in your appetite, sleep habits, energy levels, or weight  •Feeling angry, or lashing out at others  •A need to give away or throw away your belonging  •Often skipping work  •Suddenly not taking medicine for a mental illness without talking to your healthcare provider  •Suddenly not going to therapy  Treatment for suicidal thoughts:   •Medicines may be given to prevent mood swings, or to decrease anxiety or depression. You will need to take all medicines as directed. A sudden stop can be harmful. It may take 4 to 6 weeks for the medicine to help you feel better.  •Suicide risk assessment means healthcare providers will ask questions about your suicide thoughts and plans. They will ask how often you think about suicide and if you have tried it before. They will ask if you have begun to hurt yourself, such as with cutting or reckless driving. They may ask if you have access to weapons or drugs.  •A safety plan includes a list of people or groups to contact if you have suicidal feelings again. The list may include friends, family members, a spiritual leader, and others you trust. You may be asked to make a verbal agreement or sign a contract that you will not try to harm yourself.  •A therapist can help you identify and change negative feelings or beliefs about yourself. This may help change the way you feel and act. A therapist can also help you find ways to cope with things that cannot be changed.  Manage depression:   •Get help for drug or alcohol abuse. Drugs and alcohol can make suicidal feelings worse and make you more likely to act on them. Drugs and alcohol can also cause or increase depression.  •Talk to someone you trust. Be honest about your thoughts and feelings about suicide. You can call a suicide prevention center if you do not want to talk to someone you know.  •Exercise as directed. Exercise can lift your mood, give you more energy, and make it easier to sleep.   •Eat a variety of healthy foods. Healthy foods include fruits, vegetables, whole-grain breads, lean meats, fish, low-fat dairy products, and beans. Try to eat regularly even if you do not feel hungry. Depression can increase from a lack of nutrition or if you are hungry for long periods of time.  •Create a sleep routine. Try to go to bed and wake up at the same time every day. Let your healthcare provider know if you are having trouble sleeping.  •Take your medicine and go to therapy as directed. Medicine and therapy can help you manage your mental health. Do not stop taking your medicine without talking to your healthcare provider. If you do not like the way a medicine makes you feel, you may be able to try a different medicine.    **Follow up with your doctor or therapist as directed: Write down your questions so you remember to ask them during your visits.**    Se le ha proporcionado la información necesaria para realizar un seguimiento con el centro de crisis del [facility] ([facility]) y otras clínicas psiquiátricas ambulatorias dentro de raymond comunidad.    • [facility] camina en el centro de crisis [address] [place] [address] [phone] [email or web address]  Horas de operación:  Horas del día  Khang cerrado  Lunes 9 am - 3 pm  Nick 9am - 3pm  Miércoles 9 am - 3 pm  Jueves 9 am - 3 pm  Viernes 9am - 3pm  Sábado cerrado    ..... además, si raymond problema actual está asociado con el abuso de drogas o alcohol, se puede obtener más información en el Servicio de Referencia de [place] de Evaluación de Abuso de Drogas (DA[facility]RS)    • [facility] [address] [place] [address] [phone] [email or web address]-referral-service    Además, si se necesita más apoyo, información y ayuda en el área de la prevención del suicidio, por favor, no dude en comunicarse con:  • Línea directa para la prevención del suic[place] [address]  Teléfono: 4-433-180-TALK ([phone])  Dirección web: http://www.suicidepreventionlifeline.org  • Voces de educación de concienciación sobre el suicidio  [address]  [place], [place] [address]  Teléfono: 1-800.241.6613  Dirección web: http://www.save.org    La depresión    LO QUE NECESITA SABER:  La depresión es un trastorno médico que causa sentimientos de tristeza o desesperanza que no desaparecen. La depresión puede causar que usted pierda interés en las cosas que antes disfrutaba. Estos sentimientos pueden llegar a interferir con raymond hakan diaria.  INSTRUCCIONES SOBRE EL RASHAWN HOSPITALARIA:  Llame al número de emergencias local (911 en los [place]) si:  •Usted piensa en lastimarse o lastimar a alguien más.  •Usted ha hecho algo a propósito para hacerse daño.  Llame a raymond terapeuta o médico si:  •Roseanna síntomas no mejoran.  •No puede asistir a raymond próxima she.  •Usted tiene síntomas nuevos.  •Usted tiene preguntas o inquietudes acerca de raymond condición o cuidado.  Los siguientes recursos están disponibles en cualquier momento para ayudarlo, si es necesario:  •National Suicide Prevention Lifeline (línea de prevención del suicidio): 1-686.273.8592 (4-882-006-TALK)  •Teléfono directo para hablar de suicidio: 1-340.227.8011 (6-002-GKRVQFF)  •Para obtener josee lista de números internacionales: https://save.org/find-help/international-resources/  Medicamentos:  •Antidepresivospueden darse para mejorar o balancear raymond estado de ánimo. Es posible que usted necesite rosette lucas medicamento por varias semanas antes de empezar a sentirse mejor.  •Collyer roseanna medicamentos teresa se le haya indicado.Consulte con raymond médico si usted isidra que raymond medicamento no le está ayudando o si presenta efectos secundarios. Infórmele si es alérgico a algún medicamento. Mantenga josee lista actualizada de los medicamentos, las vitaminas y los productos herbales que vishnu. Incluya los siguientes datos de los medicamentos: cantidad, frecuencia y motivo de administración. Traiga con usted la lista o los envases de las píldoras a roseanna citas de seguimiento. Lleve la lista de los medicamentos con usted en brigido de josee emergencia.  La terapiase utiliza a menudo junto con medicamentos para aliviar la depresión. La terapia es un lugar para hablar sobre roseanna sentimientos y todo lo que puede estar causando la depresión. La terapia se puede realizar abiola o en nemesio. También podría realizarse con roseanna familiares o con raymond juan antonio.  Cuidados personales:  •Realice actividad física regularmente.Trate de mantenerse activo por 30 minutos, de 3 a 5 días a la semana. La actividad física puede ayudar a aliviar la depresión. Colabore con raymond médico para crear un plan de ejercicios que usted disfrute. Puede ayudarlo si le pide a alguien que se mantenga activo con usted.  •Establezca un horario regular para dormir.Josee rutina puede ayudarlo a relajarse antes de irse a dormir. Escuche música, frank o kaleigh yoga. Trate de irse a dormir y despertarse al mismo tiempo todos los días. El sueño es importante para la pattie emocional.  •Consuma alimentos saludables y variados.Los alimentos saludables incluyen frutas, verduras, panes integrales, productos lácteos descremados, jarod magras, pescado y fríjoles. Un plan alimenticio saludable es bajo en grasas, sal y azúcar adicional.  •No tome alcohol ni use drogas.El alcohol y las drogas pueden empeorar la depresión. Hable con raymond terapeuta o médico si usted necesita ayuda para dejar de fumar.  Acuda a roseanna consultas de control con raymond médico según le indicaron.Raymond médico vigilará raymond progreso en las citas de seguimiento. También monitoreará raymond medicamento si usted está tomando antidepresivos. Raymond médico le preguntará si el medicamento le está ayudando. Dígale sobre cualquier efecto secundario o problemas que usted tenga con raymond medicamento. Podría ser necesario cambiar el tipo o cantidad de medicamento. Anote roseanna preguntas para que se acuerde de hacerlas epi roseanna visitas.    Prevención del suicidio    LO QUE NECESITA SABER:    Es probable que usted neil el suicidio teresa la única forma de escapar del dolor y el sufrimiento emocional o físico. Hay ayuda disponible de personas que lo quieren y de profesionales capacitados en prevención del suicidio. La prevención incluye todo lo que usted y otras personas pueden hacer para evitar que usted se quite raymond propia hakan.  INSTRUCCIONES SOBRE EL RASHAWN HOSPITALARIA:  Llame al número local de emergencias (911 en los [place]), o pídale a alguien que llame si:  •Usted adkins hecho algo a propósito para hacerse daño a sí mismo.  •Usted elabora un plan para suicidarse.  Llame a raymond médico o terapeuta, o pídale a alguien cercano que llame si:  •Usted actúa muy enfadado, imprudente o abusa de las drogas o el alcohol.  •Usted piensa seriamente en el suicidio, aun estando en tratamiento.  •Usted tiene más pensamientos suicidas cuando está solo.  •Usted meghan de comer, o empieza a fumar cigarrillos o rosette alcohol.  •Usted tiene preguntas o inquietudes acerca de raymond condición o cuidado.  Qué hacer si usted está considerando el suicidio:  •Comuníquese con josee organización de prevención del suicidio. Las siguientes organizaciones están siempre disponibles para ayudarlo:?National Suicide Prevention Lifeline (línea de prevención del suicidio): 3-559-668-8441 (8-004-604-TALK)  ?Teléfono directo para hablar de suicidio: 2-125-649-1081 (8-477-SIXEAZX)  ?Para obtener josee lista de números internacionales: https://save.org/find-help/international-resources/  •Comuníquese con raymond terapeuta. Raymond médico puede darle josee lista de terapeutas si no tiene patti.  •No guarde medicamentos, edelmira y bebidas alcohólicas en raymond hogar.  •No pase tiempo a solas si tiene pensamientos de terminar con raymond hakan.  Signos de alerta de suicidio:Los siguientes pueden ayudarle a usted y a otras personas a reconocer raymond beba:   •Habla de raymond plan para suicidarse o quiere leer o escribir acerca de la muerte o el suicidio  •Se corta, se quema la piel con cigarrillos o maneja de forma imprudente  •Consume drogas o alcohol, no vishnu roseanna medicamentos recetados o vishnu josee cantidad mayor que la recetada  •No quiere pasar tiempo con otros o hacer cosas que usted disfruta, se siente aburrido o no quiere que nadie lo elogie  •Cambios en raymond apetito, hábitos para dormir, niveles de energía o peso  •Se siente enojado o se enfurece de repente con otras personas  •Tiene la necesidad de regalar o tirar roseanna pertenencias  •Falta a menudo al trabajo  •De repente meghan de rosette el medicamento para josee enfermedad mental sin consultar a raymond médico  •De repente no va a terapia  Tratamiento para los pensamientos suicidas:  •Los medicamentosse pueden recetar para prevenir los cambios de humor o disminuir la ansiedad o la depresión. Usted tendrá que tomarse todos roseanna medicamentos según las indicaciones. Suspender el medicamento de repente puede ser perjudicial. Pueden pasar de 4 a 6 semanas para que los medicamentos le ayuden a sentirse mejor.  •Josee evaluación del riesgo del suicidiosignifica que los médicos le harán preguntas sobre roseanna pensamientos y planes de suicidio. Le preguntarán la frecuencia con la que usted piensa sobre el suicidio y si usted lo ha intentado antes. Le preguntarán si usted ha empezado a lastimarse a sí mismo, teresa cortarse o manejar sin control. Puede que le pregunten si usted tiene acceso a edelmira o drogas.  •Un plan de seguridadincluye josee lista de personas o grupos para contactar si usted vuelve a tener pensamientos suicidas. La lista puede incluir amigos, familiares, un líder espiritual y otras personas en las que usted confía. Es probable que le pidan que kaleigh un pacto verbal o firme un contrato donde usted afirme que no tratará de lastimarse.  •Un terapeutapuede ayudarlo a identificar y cambiar los sentimientos o creencias negativos acerca de sí mismo. [place] puede ayudar a cambiar la forma en la que se siente y actúa. Un terapeuta también puede ayudarlo a encontrar formas de afrontar las cosas que no se pueden cambiar.    Maneje la depresión:  •Consiga ayuda para el abuso de las drogas o el alcohol.Las drogas y el alcohol pueden empeorar los pensamientos suicidas y aumentar las posibilidades de que usted kaleigh realidad esos pensamientos. Las drogas y el alcohol también pueden causar o aumentar la depresión.  •Hable con alguien en quien usted confíe.Sea honesto con roseanna pensamientos y sentimientos sobre el suicidio. Usted puede llamar a un centro de prevención del suicidio si prefiere no hablar con alguien a quien conozca.  •Ejercítese según indicaciones.El ejercicio puede levantarle el ánimo, darle más energía y ayudarle a dormir.  •Consuma alimentos saludables y variados.Los alimentos sanos incluyen frutas, vegetales, panes integrales, jarod magras, pescado, productos lácteos bajos en grasas y frijoles. Trate de comer regularmente aunque no sienta hambre. La depresión puede aumentar debido a josee falta de nutrición o si usted se pasa con hambre por largos periodos de tiempo.  •Isidra josee rutina para dormir.Trate de irse a dormir y despertarse al mismo tiempo todos los días. Informe a raymond médico si tiene dificultad para dormir.  •Collyer los medicamentos y vaya a terapia teresa se le indicó.Los medicamentos y la terapia pueden ayudarlo a controlar raymond pattie mental. No suspenda los medicamentos sin antes consultar con raymond médico. Si no le gusta la forma en que un medicamento lo hace sentir, es posible que pueda probar un medicamento diferente.  Para apoyo y más información:    Acuda a roseanna consultas de control con raymond médico o terapeuta según le indicaron:Anote roseanna preguntas para que se acuerde de hacerlas epi roseanna visitas.

## 2023-09-08 NOTE — ED PROVIDER NOTE - CONSTITUTIONAL, MLM
normal... Apathetic, thin appearing, awake, alert, oriented to person, place, time/situation and in no apparent distress.

## 2023-09-08 NOTE — ED PROVIDER NOTE - CLINICAL SUMMARY MEDICAL DECISION MAKING FREE TEXT BOX
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 She denies HI/AVH. or recent  alcohol or drug use.    Vital Signs Stable   PE: as described; my additions and exceptions are noted in the chart    IMPRESSION/RISK:  Dx=  Anxiety and depression  Consideration include: good support has insight wants help SI intermittent but no plan   Plan  Patient already has prescriptions for antidepressant drugs (lyrica and seroquel)   Patient amenable to followup with crisis center either this morning or other regularly scheduled day  PT requestiing meds to "calm down now" one mg of ativan given   depression referrals info and crisis ctr contact given in english and Pashto  bariatric sx: patient already has doc;; saw yesterday had labs ordered no active complaints tonight can followup with them and RTED PRN if results dictate; referral to our bariatric surgeons made as well as PCP Above (also explained to mother and father   RTED PRN

## 2023-09-08 NOTE — ED ADULT TRIAGE NOTE - CHIEF COMPLAINT QUOTE
c/o feeling anxious due to stressors in life x 1month, unable to sleep or eat. Endorses thought of self-harm, no plan, denies HI/AVH. Denies alcohol or drug use. Denies psych hx, hx of bariatric surgery history c/o feeling anxious due to stressors in life x 1month, unable to sleep or eat. Endorses thought of self-harm, no plan, denies HI/AVH. Denies alcohol or drug use. Denies psych hx, hx of bariatric surgery history. Mother: [phone]

## 2023-09-08 NOTE — ED PROVIDER NOTE - NSICDXPASTSURGICALHX_GEN_ALL_CORE_FT
PAST SURGICAL HISTORY:  Cleft Palate 3mnths, 5mnths and [age] year of age.   Cleft lip/palate repair with Dr. Elsy Osman. no complications.  2 palate surgeries with Dr. Bailey, most recent at 9 years of age.   Nasal surgery in [place] ([facility]) 11/2016    Ear Problem Left ear perforated ear drum repair at 6 years of age.   ear surgery 2021 "to widen ear canal"    H/O gastric sleeve 8/2022    History of incision and drainage of right axillary abscess 2020 , 2022    Maxillary hypoplasia s/p Lefort procedure in 6/2016.

## 2023-09-08 NOTE — ED ADULT NURSE NOTE - OBJECTIVE STATEMENT
21yo female received in waiting room, hx of bariatric surgery, c/o feeling anxious due to stressors in life x 1month, unable to sleep or eat. Endorses thought of self-harm, no plan, denies HI/AVH. Denies alcohol or drug use. MD Patel evaluating patient in waiting room, patient would like to see psychiatrist in a crisis center, MD to order PO Ativan and DC.

## 2023-09-08 NOTE — ED PROVIDER NOTE - ENMT, MLM
Cleft palate repair scar; Airway patent, Nasal mucosa clear. Mouth with normal mucosa. Throat has no vesicles, no oropharyngeal exudates and uvula is midline.

## 2023-09-08 NOTE — ED PROVIDER NOTE - OBJECTIVE STATEMENT
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 She denies HI/AVH. or recent  alcohol or drug use.

## 2023-09-08 NOTE — ED ADULT NURSE NOTE - CHIEF COMPLAINT QUOTE
c/o feeling anxious due to stressors in life x 1month, unable to sleep or eat. Endorses thought of self-harm, no plan, denies HI/AVH. Denies alcohol or drug use. Denies psych hx, hx of bariatric surgery history. Mother: [phone]

## 2023-09-09 ENCOUNTER — EMERGENCY (EMERGENCY)
Facility: HOSPITAL | Age: 23
LOS: 1 days | Discharge: HOME | End: 2023-09-09
Admitting: EMERGENCY MEDICINE
Payer: MEDICAID

## 2023-09-09 VITALS
SYSTOLIC BLOOD PRESSURE: 111 MMHG | OXYGEN SATURATION: 99 % | HEART RATE: 102 BPM | DIASTOLIC BLOOD PRESSURE: 72 MMHG | TEMPERATURE: 98 F | RESPIRATION RATE: 18 BRPM

## 2023-09-09 DIAGNOSIS — M26.02 MAXILLARY HYPOPLASIA: Chronic | ICD-10-CM

## 2023-09-09 DIAGNOSIS — Z98.890 OTHER SPECIFIED POSTPROCEDURAL STATES: Chronic | ICD-10-CM

## 2023-09-09 DIAGNOSIS — Z90.3 ACQUIRED ABSENCE OF STOMACH [PART OF]: Chronic | ICD-10-CM

## 2023-09-09 PROCEDURE — 99284 EMERGENCY DEPT VISIT MOD MDM: CPT

## 2023-09-09 RX ORDER — HYDROXYZINE HCL 10 MG
1 TABLET ORAL
Qty: 9 | Refills: 0
Start: 2023-09-09 | End: 2023-09-11

## 2023-09-09 RX ADMIN — Medication 1 MILLIGRAM(S): at 13:42

## 2023-09-09 NOTE — ED ADULT NURSE NOTE - OBJECTIVE STATEMENT
Pt Hx: Bariatric sleeve surgery, cleft lip. Pt endorsing anxiety about a decision related to her college/future. Pt on medications prescribed by an online doctor in [facility]. Lexapro, Seroquel, Lyrica and 2 unknown medications. Pt states despite these meds she is having racing thoughts and brain fog. Denies; hallucinations, S/I, H/I, ETOH/drug use, SIB.

## 2023-09-09 NOTE — ED PROVIDER NOTE - NSICDXPASTSURGICALHX_GEN_ALL_CORE_FT
PAST SURGICAL HISTORY:  Cleft Palate 3mnths, 6mnths and [age] year of age.   Cleft lip/palate repair with Dr. Elsy Osman. no complications.  2 palate surgeries with Dr. Bailey, most recent at 9 years of age.   Nasal surgery in [place] ([facility]) 11/2016    Ear Problem Left ear perforated ear drum repair at 6 years of age.   ear surgery 2021 "to widen ear canal"    H/O gastric sleeve 8/2022    History of incision and drainage of right axillary abscess 2020 , 2022    Maxillary hypoplasia s/p Lefort procedure in 6/2016.

## 2023-09-09 NOTE — ED ADULT TRIAGE NOTE - CHIEF COMPLAINT QUOTE
c/o feeling anxious, not eating or sleeping for the past 2 days., denies S/I, H/I endorses derogatory A/H but denies V/HY. calm and cooperative, answers to questions appropriately.

## 2023-09-09 NOTE — ED ADULT NURSE NOTE - NSFALLUNIVINTERV_ED_ALL_ED
Bed/Stretcher in lowest position, wheels locked, appropriate side rails in place/Call bell, personal items and telephone in reach/Instruct patient to call for assistance before getting out of bed/chair/stretcher/Non-slip footwear applied when patient is off stretcher/[place] to call system/Physically safe environment - no spills, clutter or unnecessary equipment/Purposeful proactive rounding/Room/bathroom lighting operational, light cord in reach

## 2023-09-09 NOTE — ED PROVIDER NOTE - PATIENT PORTAL LINK FT
You can access the FollowMyHealth Patient Portal offered by [facility] by registering at the following website: [email or web address]. By joining [employer]s FollowMyHealth portal, you will also be able to view your health information using other applications (apps) compatible with our system.

## 2023-09-09 NOTE — ED PROVIDER NOTE - OBJECTIVE STATEMENT
23 y/o F hx Gastric Sleeve BIB family secondary to increase anxiousness.  Patient currently enrolled at [employer] admits to feeling overwhelmed secondary to demands of classes.  Admit to seeing a psychiatrist on line in [place] who prescribed Seroquel, Lyrica and Lexapro for here. Explicitly denies SI/HI/AH/VH.  Denies pain, dizziness, SOB, fever, chills, chest/abdominal discomfort.  Denies falling, punching or kicking any objects. No evidence of physical injuries, broken skin or deformities. Denies use of alcohol or illicit drugs.

## 2023-09-09 NOTE — ED PROVIDER NOTE - CLINICAL SUMMARY MEDICAL DECISION MAKING FREE TEXT BOX
21 y/o F hx Gastric Sleeve   Medical evaluation performed. There is no clinical evidence of intoxication or any acute medical problem requiring immediate intervention. Medication offered, recommend following up with [facility].

## 2023-09-11 ENCOUNTER — OUTPATIENT (OUTPATIENT)
Dept: OUTPATIENT SERVICES | Facility: HOSPITAL | Age: 23
LOS: 1 days | Discharge: STILL ADMITTED OR ADMINISTRATIVE | End: 2023-09-11
Payer: MEDICARE

## 2023-09-11 DIAGNOSIS — Z98.890 OTHER SPECIFIED POSTPROCEDURAL STATES: Chronic | ICD-10-CM

## 2023-09-11 DIAGNOSIS — Z90.3 ACQUIRED ABSENCE OF STOMACH [PART OF]: Chronic | ICD-10-CM

## 2023-09-11 DIAGNOSIS — M26.02 MAXILLARY HYPOPLASIA: Chronic | ICD-10-CM

## 2023-09-12 DIAGNOSIS — F41.9 ANXIETY DISORDER, UNSPECIFIED: ICD-10-CM

## 2023-09-12 LAB — VIT B1 SERPL-MCNC: 110.9 NMOL/L

## 2023-09-17 ENCOUNTER — NON-APPOINTMENT (OUTPATIENT)
Age: 23
End: 2023-09-17

## 2023-09-20 ENCOUNTER — LABORATORY RESULT (OUTPATIENT)
Age: 23
End: 2023-09-20

## 2023-09-20 ENCOUNTER — APPOINTMENT (OUTPATIENT)
Dept: INTERNAL MEDICINE | Facility: CLINIC | Age: 23
End: 2023-09-20
Payer: MEDICAID

## 2023-09-20 VITALS
SYSTOLIC BLOOD PRESSURE: 123 MMHG | OXYGEN SATURATION: 97 % | HEART RATE: 91 BPM | DIASTOLIC BLOOD PRESSURE: 79 MMHG | WEIGHT: 106 LBS | TEMPERATURE: 98.8 F

## 2023-09-20 DIAGNOSIS — J03.90 ACUTE TONSILLITIS, UNSPECIFIED: ICD-10-CM

## 2023-09-20 DIAGNOSIS — B00.1 HERPESVIRAL VESICULAR DERMATITIS: ICD-10-CM

## 2023-09-20 DIAGNOSIS — L65.9 NONSCARRING HAIR LOSS, UNSPECIFIED: ICD-10-CM

## 2023-09-20 PROCEDURE — 99214 OFFICE O/P EST MOD 30 MIN: CPT

## 2023-09-20 RX ORDER — AZITHROMYCIN 250 MG/1
250 TABLET, FILM COATED ORAL
Qty: 1 | Refills: 0 | Status: DISCONTINUED | COMMUNITY
Start: 2023-09-20 | End: 2023-09-20

## 2023-09-21 LAB
INFLUENZA A RESULT: NOT DETECTED
INFLUENZA B RESULT: NOT DETECTED
RESP SYN VIRUS RESULT: NOT DETECTED
S PYO DNA THROAT QL NAA+PROBE: NOT DETECTED
SARS-COV-2 RESULT: NOT DETECTED
T3 SERPL-MCNC: 107 NG/DL
T3RU NFR SERPL: 1.1 TBI
T4 FREE SERPL-MCNC: 1 NG/DL
T4 SERPL-MCNC: 5.6 UG/DL
TSH SERPL-ACNC: 1.9 UIU/ML

## 2023-09-22 LAB
HSV 1+2 IGG SER IA-IMP: NEGATIVE
HSV 1+2 IGG SER IA-IMP: NEGATIVE
HSV1 IGG SER QL: 0.13 INDEX
HSV2 IGG SER QL: 0.35 INDEX

## 2023-09-24 DIAGNOSIS — R63.4 ABNORMAL WEIGHT LOSS: ICD-10-CM

## 2023-09-26 ENCOUNTER — APPOINTMENT (OUTPATIENT)
Dept: SURGERY | Facility: CLINIC | Age: 23
End: 2023-09-26

## 2023-09-26 ENCOUNTER — EMERGENCY (EMERGENCY)
Facility: HOSPITAL | Age: 23
LOS: 1 days | Discharge: HOME | End: 2023-09-26
Attending: EMERGENCY MEDICINE | Admitting: EMERGENCY MEDICINE
Payer: MEDICAID

## 2023-09-26 VITALS
RESPIRATION RATE: 18 BRPM | DIASTOLIC BLOOD PRESSURE: 84 MMHG | HEART RATE: 64 BPM | TEMPERATURE: 98 F | SYSTOLIC BLOOD PRESSURE: 129 MMHG | OXYGEN SATURATION: 100 %

## 2023-09-26 DIAGNOSIS — Z98.890 OTHER SPECIFIED POSTPROCEDURAL STATES: Chronic | ICD-10-CM

## 2023-09-26 DIAGNOSIS — Z90.3 ACQUIRED ABSENCE OF STOMACH [PART OF]: Chronic | ICD-10-CM

## 2023-09-26 DIAGNOSIS — M26.02 MAXILLARY HYPOPLASIA: Chronic | ICD-10-CM

## 2023-09-26 PROCEDURE — 99285 EMERGENCY DEPT VISIT HI MDM: CPT

## 2023-09-26 NOTE — ED ADULT TRIAGE NOTE - CHIEF COMPLAINT QUOTE
sudden onset of diffuse ab pain since 6pm, PT denies any N/V or fever but does report one loose stool today. Hx of gastric sleeve

## 2023-09-27 VITALS
OXYGEN SATURATION: 100 % | RESPIRATION RATE: 16 BRPM | DIASTOLIC BLOOD PRESSURE: 86 MMHG | SYSTOLIC BLOOD PRESSURE: 122 MMHG | HEART RATE: 68 BPM | TEMPERATURE: 98 F

## 2023-09-27 LAB
ALBUMIN SERPL ELPH-MCNC: 4.1 G/DL — SIGNIFICANT CHANGE UP (ref 3.3–5)
ALP SERPL-CCNC: 74 U/L — SIGNIFICANT CHANGE UP (ref 40–120)
ALT FLD-CCNC: 16 U/L — SIGNIFICANT CHANGE UP (ref 4–33)
ANION GAP SERPL CALC-SCNC: 12 MMOL/L — SIGNIFICANT CHANGE UP (ref 7–14)
APPEARANCE UR: ABNORMAL
AST SERPL-CCNC: 16 U/L — SIGNIFICANT CHANGE UP (ref 4–32)
BACTERIA # UR AUTO: ABNORMAL /HPF
BASOPHILS # BLD AUTO: 0.03 K/UL — SIGNIFICANT CHANGE UP (ref 0–0.2)
BASOPHILS NFR BLD AUTO: 0.5 % — SIGNIFICANT CHANGE UP (ref 0–2)
BILIRUB SERPL-MCNC: 0.4 MG/DL — SIGNIFICANT CHANGE UP (ref 0.2–1.2)
BILIRUB UR-MCNC: NEGATIVE — SIGNIFICANT CHANGE UP
BUN SERPL-MCNC: 16 MG/DL — SIGNIFICANT CHANGE UP (ref 7–23)
CALCIUM SERPL-MCNC: 9.6 MG/DL — SIGNIFICANT CHANGE UP (ref 8.4–10.5)
CHLORIDE SERPL-SCNC: 102 MMOL/L — SIGNIFICANT CHANGE UP (ref 98–107)
CO2 SERPL-SCNC: 27 MMOL/L — SIGNIFICANT CHANGE UP (ref 22–31)
COLOR SPEC: YELLOW — SIGNIFICANT CHANGE UP
CREAT SERPL-MCNC: 0.66 MG/DL — SIGNIFICANT CHANGE UP (ref 0.5–1.3)
DIFF PNL FLD: ABNORMAL
EGFR: 126 ML/MIN/1.73M2 — SIGNIFICANT CHANGE UP
EOSINOPHIL # BLD AUTO: 0.05 K/UL — SIGNIFICANT CHANGE UP (ref 0–0.5)
EOSINOPHIL NFR BLD AUTO: 0.8 % — SIGNIFICANT CHANGE UP (ref 0–6)
EPI CELLS # UR: PRESENT
GLUCOSE SERPL-MCNC: 95 MG/DL — SIGNIFICANT CHANGE UP (ref 70–99)
GLUCOSE UR QL: NEGATIVE MG/DL — SIGNIFICANT CHANGE UP
HCG SERPL-ACNC: <1 MIU/ML — SIGNIFICANT CHANGE UP
HCT VFR BLD CALC: 36.3 % — SIGNIFICANT CHANGE UP (ref 34.5–45)
HGB BLD-MCNC: 11 G/DL — LOW (ref 11.5–15.5)
IANC: 3.65 K/UL — SIGNIFICANT CHANGE UP (ref 1.8–7.4)
IMM GRANULOCYTES NFR BLD AUTO: 0.3 % — SIGNIFICANT CHANGE UP (ref 0–0.9)
KETONES UR-MCNC: NEGATIVE MG/DL — SIGNIFICANT CHANGE UP
LEUKOCYTE ESTERASE UR-ACNC: ABNORMAL
LIDOCAIN IGE QN: 19 U/L — SIGNIFICANT CHANGE UP (ref 7–60)
LYMPHOCYTES # BLD AUTO: 1.68 K/UL — SIGNIFICANT CHANGE UP (ref 1–3.3)
LYMPHOCYTES # BLD AUTO: 28 % — SIGNIFICANT CHANGE UP (ref 13–44)
MCHC RBC-ENTMCNC: 25.6 PG — LOW (ref 27–34)
MCHC RBC-ENTMCNC: 30.3 GM/DL — LOW (ref 32–36)
MCV RBC AUTO: 84.4 FL — SIGNIFICANT CHANGE UP (ref 80–100)
MONOCYTES # BLD AUTO: 0.57 K/UL — SIGNIFICANT CHANGE UP (ref 0–0.9)
MONOCYTES NFR BLD AUTO: 9.5 % — SIGNIFICANT CHANGE UP (ref 2–14)
NEUTROPHILS # BLD AUTO: 3.65 K/UL — SIGNIFICANT CHANGE UP (ref 1.8–7.4)
NEUTROPHILS NFR BLD AUTO: 60.9 % — SIGNIFICANT CHANGE UP (ref 43–77)
NITRITE UR-MCNC: NEGATIVE — SIGNIFICANT CHANGE UP
NRBC # BLD: 0 /100 WBCS — SIGNIFICANT CHANGE UP (ref 0–0)
NRBC # FLD: 0 K/UL — SIGNIFICANT CHANGE UP (ref 0–0)
PH UR: 6 — SIGNIFICANT CHANGE UP (ref 5–8)
PLATELET # BLD AUTO: 259 K/UL — SIGNIFICANT CHANGE UP (ref 150–400)
POTASSIUM SERPL-MCNC: 4.1 MMOL/L — SIGNIFICANT CHANGE UP (ref 3.5–5.3)
POTASSIUM SERPL-SCNC: 4.1 MMOL/L — SIGNIFICANT CHANGE UP (ref 3.5–5.3)
PROT SERPL-MCNC: 6.2 G/DL — SIGNIFICANT CHANGE UP (ref 6–8.3)
PROT UR-MCNC: 30 MG/DL
RBC # BLD: 4.3 M/UL — SIGNIFICANT CHANGE UP (ref 3.8–5.2)
RBC # FLD: 14.8 % — HIGH (ref 10.3–14.5)
RBC CASTS # UR COMP ASSIST: 3 /HPF — SIGNIFICANT CHANGE UP (ref 0–4)
SODIUM SERPL-SCNC: 141 MMOL/L — SIGNIFICANT CHANGE UP (ref 135–145)
SP GR SPEC: 1.03 — SIGNIFICANT CHANGE UP (ref 1–1.03)
UROBILINOGEN FLD QL: 0.2 MG/DL — SIGNIFICANT CHANGE UP (ref 0.2–1)
WBC # BLD: 6 K/UL — SIGNIFICANT CHANGE UP (ref 3.8–10.5)
WBC # FLD AUTO: 6 K/UL — SIGNIFICANT CHANGE UP (ref 3.8–10.5)
WBC UR QL: 12 /HPF — HIGH (ref 0–5)

## 2023-09-27 PROCEDURE — 74177 CT ABD & PELVIS W/CONTRAST: CPT | Mod: 26,MA

## 2023-09-27 RX ORDER — FAMOTIDINE 10 MG/ML
20 INJECTION INTRAVENOUS ONCE
Refills: 0 | Status: COMPLETED | OUTPATIENT
Start: 2023-09-27 | End: 2023-09-27

## 2023-09-27 RX ORDER — SODIUM CHLORIDE 9 MG/ML
1000 INJECTION INTRAMUSCULAR; INTRAVENOUS; SUBCUTANEOUS ONCE
Refills: 0 | Status: COMPLETED | OUTPATIENT
Start: 2023-09-27 | End: 2023-09-27

## 2023-09-27 RX ORDER — ACETAMINOPHEN 500 MG
1000 TABLET ORAL ONCE
Refills: 0 | Status: COMPLETED | OUTPATIENT
Start: 2023-09-27 | End: 2023-09-27

## 2023-09-27 RX ORDER — THIAMINE MONONITRATE (VIT B1) 100 MG
100 TABLET ORAL ONCE
Refills: 0 | Status: COMPLETED | OUTPATIENT
Start: 2023-09-27 | End: 2023-09-27

## 2023-09-27 RX ORDER — IOHEXOL 300 MG/ML
30 INJECTION, SOLUTION INTRAVENOUS ONCE
Refills: 0 | Status: COMPLETED | OUTPATIENT
Start: 2023-09-27 | End: 2023-09-27

## 2023-09-27 RX ORDER — FOLIC ACID 0.8 MG
1 TABLET ORAL DAILY
Refills: 0 | Status: DISCONTINUED | OUTPATIENT
Start: 2023-09-27 | End: 2023-09-30

## 2023-09-27 RX ADMIN — FAMOTIDINE 20 MILLIGRAM(S): 10 INJECTION INTRAVENOUS at 05:15

## 2023-09-27 RX ADMIN — SODIUM CHLORIDE 1000 MILLILITER(S): 9 INJECTION INTRAMUSCULAR; INTRAVENOUS; SUBCUTANEOUS at 02:10

## 2023-09-27 RX ADMIN — IOHEXOL 30 MILLILITER(S): 300 INJECTION, SOLUTION INTRAVENOUS at 02:47

## 2023-09-27 RX ADMIN — Medication 1000 MILLIGRAM(S): at 04:00

## 2023-09-27 RX ADMIN — Medication 100 MILLIGRAM(S): at 02:09

## 2023-09-27 RX ADMIN — Medication 1 TABLET(S): at 02:10

## 2023-09-27 RX ADMIN — Medication 30 MILLILITER(S): at 05:15

## 2023-09-27 RX ADMIN — Medication 400 MILLIGRAM(S): at 02:07

## 2023-09-27 RX ADMIN — Medication 1 MILLIGRAM(S): at 02:10

## 2023-09-27 NOTE — ED PROVIDER NOTE - OBJECTIVE STATEMENT
Patient is a 22-year-old female with past medical history of gastric sleeve 1 year ago done in [facility]  presenting to the emergency department with diffuse abdominal pain that began  at 6 PM last night.  Constant, nonradiating.  No associated urinary changes.   No nausea, vomiting, intermittent episodes of constipation and loose stools.  Passing gas.  No fever, chills.  No urinary or bowel changes.  Patient has not had any follow-up regarding her gastric sleeve.  Has not been eating for pain.  No history of renal stones ovarian cyst.  Possible stroke.  Earlier this month. No

## 2023-09-27 NOTE — ED PROVIDER NOTE - ATTENDING CONTRIBUTION TO CARE
I performed a face-to-face evaluation of the patient and performed a history and physical examination along with the resident or ACP, and/or medical student above.  I agree with the history and physical examination as documented by the resident or ACP, and/or medical student above.  [place]:  General: Alert and Orientated x 3. No apparent distress.  Head: Normocephalic and atraumatic.  Eyes: PERRLA with EOMI.  Neck: Supple. Trachea midline.   Cardiac: Normal S1 and S2 w/ RRR. No murmurs appreciated. No JVD appreciated.  Pulmonary: CTA bilaterally. No increased WOB. No wheezes or crackles.  Abdominal: Soft, mild diffuse abd ttp. (+) bowel sounds appreciated in all 4 quadrants. No hepatosplenomegaly. no cva ttp.  Neurologic: No focal sensory or motor deficits.  Musculoskeletal: Strength appropriate in all 4 extremities for age with no limited ROM.  Skin: Color appropriate for race. Intact, warm, and well-perfused.  Psychiatric: Appropriate mood and affect. No apparent risk to self or others.

## 2023-09-27 NOTE — ED PROVIDER NOTE - ADDITIONAL NOTES AND INSTRUCTIONS:
Please excuse Kimberlee Duke from [employer] on 9/27/23. She was seen in our Emergency Department.     Verito Mittal MD

## 2023-09-27 NOTE — ED ADULT NURSE NOTE - OBJECTIVE STATEMENT
pt received to room 02 a&ox4 ambulatory with past medical history of gastric sleeve and cleft palette repair c/o abdominal pain. pt abdomen soft and nondistended. pt endorses passing flatus in ED examination room. pt endorses non-bloody diarrhea. pt denies chest pain, sob, nausea, vomiting, dizziness, headache, fevers/chills. 20g placed to the Left AC. labs collected and sent. pt medicated as per md orders. pt given oral contast, CT aware. pt pending CT at 03:50. pt respirations even and unlabored with no accessory muscle use. pt appears in NAD and safety maintained.

## 2023-09-27 NOTE — ED ADULT NURSE NOTE - NSFALLUNIVINTERV_ED_ALL_ED
Bed/Stretcher in lowest position, wheels locked, appropriate side rails in place/Call bell, personal items and telephone in reach/Instruct patient to call for assistance before getting out of bed/chair/stretcher/Non-slip footwear applied when patient is off stretcher/[place] to call system/Physically safe environment - no spills, clutter or unnecessary equipment/Purposeful proactive rounding/Room/bathroom lighting operational, light cord in reach

## 2023-09-27 NOTE — ED PROVIDER NOTE - PHYSICAL EXAMINATION
General: Alert and Orientated x 3. No apparent distress.  Head: Normocephalic and atraumatic.  Eyes: PERRLA with EOMI.  Neck: Supple. Trachea midline.   Cardiac: Normal S1 and S2 w/ RRR. No murmurs appreciated. No JVD appreciated.  Pulmonary: CTA bilaterally. No increased WOB. No wheezes or crackles.  Abdominal: Soft, mild diffuse abd ttp. (+) bowel sounds appreciated in all 4 quadrants. No hepatosplenomegaly. no cva ttp.  Neurologic: No focal sensory or motor deficits.  Musculoskeletal: Strength appropriate in all 4 extremities for age with no limited ROM.  Skin: Color appropriate for race. Intact, warm, and well-perfused.  Psychiatric: Appropriate mood and affect. No apparent risk to self or others.

## 2023-09-27 NOTE — ED PROVIDER NOTE - NSPTACCESSSVCSAPPTDETAILS_ED_ALL_ED_FT
bariatric surgery- hx of gastric sleeve in [facility]   GI gastric sleeve, gastritis

## 2023-09-27 NOTE — ED ADULT NURSE REASSESSMENT NOTE - NS ED NURSE REASSESS COMMENT FT1
pt a&ox4, pt PO challenge passed. pt IV out as per MD.  pt respirations even and unlabored with no accessory muscle use, safety maintained

## 2023-09-27 NOTE — ED PROVIDER NOTE - PROGRESS NOTE DETAILS
Verito Mittal MD (PGY3): CT with likely gastritis.  Patient tolerating p.o.  Strict return precaution discussed.  Will provide bariatric surgery and gastroenterology follow-up.  Patient to follow-up with PMD as well.

## 2023-09-27 NOTE — ED PROVIDER NOTE - NSICDXPASTSURGICALHX_GEN_ALL_CORE_FT
PAST SURGICAL HISTORY:  Cleft Palate 3mnths, 6mnths and [age] year of age.   Cleft lip/palate repair with Dr. Elsy Osman. no complications.  2 palate surgeries with Dr. Bailey, most recent at 9 years of age.   Nasal surgery in [place] ([facility]) 11/2016    Ear Problem Left ear perforated ear drum repair at 6 years of age.   ear surgery 2021 "to widen ear canal"    H/O gastric sleeve 8/2022    History of incision and drainage of right axillary abscess 2020 , 2022    Maxillary hypoplasia s/p Lefort procedure in 6/2016.

## 2023-09-27 NOTE — ED PROVIDER NOTE - PATIENT PORTAL LINK FT
You can access the FollowMyHealth Patient Portal offered by [facility] by registering at the following website: [email or web address]. By joining [employer]’s FollowMyHealth portal, you will also be able to view your health information using other applications (apps) compatible with our system.

## 2023-09-27 NOTE — ED PROVIDER NOTE - NSFOLLOWUPINSTRUCTIONS_ED_ALL_ED_FT
You were seen in the Emergency Department for abdominal pain. Lab and imaging results, if performed, were discussed with you along with your discharge diagnosis.    You will receive a call to make an appointment with Bariatric Surgery and Gastroenterology. List of providers and their information has been given. Follow up with your doctor in 1 week - bring copies of your results if you were given. If you do not have a primary doctor, please call [phone] to find one convenient for you.    Continue all prescribed medications. You may use over the counter pepcid 20mg once a day. You may also use over the counter maalox 30mL once a day.     To control your pain at home, you should take Tylenol 650mg-1000mg every 6 to 8 hours. Limit your maximum daily Tylenol from all sources to 4000mg. Be aware that many other medications contain acetaminophen which is also known as Tylenol. Taking Tylenol and Ibuprofen together has been shown to be more effective at relieving pain than taking them separately. These are both over the counter medications that you can  at your local pharmacy without a prescription. You need to respect all of the warnings on the bottles. You shouldn’t take these medications for more than a week without following up with your doctor. Both medications come with certain risks and side effects that you need to discuss with your doctor, especially if you are taking them for a prolonged period.    Return to ED for any new or worsening symptoms including but not limited to: development of chest pain, shortness of breath, fever, vomiting, focal numbness, weakness or tingling, any severe CP, headache, abdominal pain, back pain.      Rest and keep yourself hydrated with fluids.

## 2023-09-27 NOTE — ED PROVIDER NOTE - NSFOLLOWUPCLINICS_GEN_ALL_ED_FT
Gastroenterology at [facility]  Gastroenterology  [address] [place] [address]  Phone: [phone]  Fax:     [facility]  [facility]  [address] [facility] [place] [address] [place] [address]  Phone: [phone]  Fax:

## 2023-09-28 ENCOUNTER — APPOINTMENT (OUTPATIENT)
Dept: INTERNAL MEDICINE | Facility: CLINIC | Age: 23
End: 2023-09-28

## 2023-09-29 LAB
CULTURE RESULTS: SIGNIFICANT CHANGE UP
SPECIMEN SOURCE: SIGNIFICANT CHANGE UP

## 2023-10-10 ENCOUNTER — APPOINTMENT (OUTPATIENT)
Dept: INTERNAL MEDICINE | Facility: CLINIC | Age: 23
End: 2023-10-10
Payer: MEDICAID

## 2023-10-10 VITALS
HEART RATE: 85 BPM | WEIGHT: 103 LBS | DIASTOLIC BLOOD PRESSURE: 86 MMHG | BODY MASS INDEX: 17.16 KG/M2 | SYSTOLIC BLOOD PRESSURE: 126 MMHG | OXYGEN SATURATION: 97 % | HEIGHT: 65 IN

## 2023-10-10 DIAGNOSIS — F41.9 ANXIETY DISORDER, UNSPECIFIED: ICD-10-CM

## 2023-10-10 DIAGNOSIS — R79.89 OTHER SPECIFIED ABNORMAL FINDINGS OF BLOOD CHEMISTRY: ICD-10-CM

## 2023-10-10 DIAGNOSIS — D50.8 OTHER IRON DEFICIENCY ANEMIAS: ICD-10-CM

## 2023-10-10 DIAGNOSIS — F32.A ANXIETY DISORDER, UNSPECIFIED: ICD-10-CM

## 2023-10-10 PROCEDURE — 99214 OFFICE O/P EST MOD 30 MIN: CPT

## 2023-10-10 RX ORDER — OMEPRAZOLE 20 MG/1
20 CAPSULE, DELAYED RELEASE ORAL
Qty: 90 | Refills: 0 | Status: ACTIVE | COMMUNITY
Start: 2023-04-26 | End: 1900-01-01

## 2023-10-10 RX ORDER — AMOXICILLIN AND CLAVULANATE POTASSIUM 875; 125 MG/1; MG/1
875-125 TABLET, COATED ORAL
Qty: 14 | Refills: 0 | Status: COMPLETED | COMMUNITY
Start: 2023-09-20 | End: 2023-10-10

## 2023-10-11 ENCOUNTER — TRANSCRIPTION ENCOUNTER (OUTPATIENT)
Age: 23
End: 2023-10-11

## 2023-10-11 ENCOUNTER — APPOINTMENT (OUTPATIENT)
Dept: INTERNAL MEDICINE | Facility: CLINIC | Age: 23
End: 2023-10-11

## 2023-10-11 DIAGNOSIS — G47.00 INSOMNIA, UNSPECIFIED: ICD-10-CM

## 2023-10-11 LAB
25(OH)D3 SERPL-MCNC: 74.8 NG/ML
BASOPHILS # BLD AUTO: 0.04 K/UL
BASOPHILS NFR BLD AUTO: 0.6 %
EOSINOPHIL # BLD AUTO: 0.02 K/UL
EOSINOPHIL NFR BLD AUTO: 0.3 %
FERRITIN SERPL-MCNC: 25 NG/ML
FOLATE SERPL-MCNC: >20 NG/ML
HCT VFR BLD CALC: 40.7 %
HGB BLD-MCNC: 12.4 G/DL
IMM GRANULOCYTES NFR BLD AUTO: 0.2 %
IRON SATN MFR SERPL: 24 %
IRON SERPL-MCNC: 100 UG/DL
LYMPHOCYTES # BLD AUTO: 1.79 K/UL
LYMPHOCYTES NFR BLD AUTO: 27.5 %
MAN DIFF?: NORMAL
MCHC RBC-ENTMCNC: 26.5 PG
MCHC RBC-ENTMCNC: 30.5 GM/DL
MCV RBC AUTO: 87 FL
MONOCYTES # BLD AUTO: 0.35 K/UL
MONOCYTES NFR BLD AUTO: 5.4 %
NEUTROPHILS # BLD AUTO: 4.29 K/UL
NEUTROPHILS NFR BLD AUTO: 66 %
PLATELET # BLD AUTO: 318 K/UL
RBC # BLD: 4.68 M/UL
RBC # FLD: 16.6 %
TIBC SERPL-MCNC: 422 UG/DL
UIBC SERPL-MCNC: 323 UG/DL
VIT B12 SERPL-MCNC: 1180 PG/ML
WBC # FLD AUTO: 6.5 K/UL

## 2023-10-12 DIAGNOSIS — R10.9 UNSPECIFIED ABDOMINAL PAIN: ICD-10-CM

## 2023-10-16 ENCOUNTER — APPOINTMENT (OUTPATIENT)
Dept: OTOLARYNGOLOGY | Facility: CLINIC | Age: 23
End: 2023-10-16

## 2023-10-17 ENCOUNTER — APPOINTMENT (OUTPATIENT)
Age: 23
End: 2023-10-17
Payer: MEDICAID

## 2023-10-17 VITALS
SYSTOLIC BLOOD PRESSURE: 122 MMHG | OXYGEN SATURATION: 96 % | BODY MASS INDEX: 16.83 KG/M2 | HEIGHT: 65 IN | WEIGHT: 101 LBS | HEART RATE: 81 BPM | DIASTOLIC BLOOD PRESSURE: 81 MMHG

## 2023-10-17 PROCEDURE — 99213 OFFICE O/P EST LOW 20 MIN: CPT

## 2023-10-17 RX ORDER — SERTRALINE 25 MG/1
25 TABLET, FILM COATED ORAL
Qty: 60 | Refills: 3 | Status: COMPLETED | COMMUNITY
Start: 2023-10-11 | End: 2023-10-17

## 2023-11-21 ENCOUNTER — APPOINTMENT (OUTPATIENT)
Dept: INTERNAL MEDICINE | Facility: CLINIC | Age: 23
End: 2023-11-21

## 2023-11-21 NOTE — ASU PREOPERATIVE ASSESSMENT, ADULT ([facility]ARK ONLY) - IV TYPE/AMT
Principal Orthopedic Problem: Flexor tenosynovitis right index finger     10/09/23: :   Flexor tenosynovectomy right index finger - 73643                          Incision and drainage with irrigation of right index finger flexor tendon sheath - 87500    MICRO: VIBRIO CHOLERAE :2 weeks of IV ceftriaxone and 2 weeks of oral doxycycline     Mr. Burt is here today for a post-operative visit    Interval History:  he reports that he is doing ok.   he is at home. he is not participating in PT/OT.   Pain is controlled.  he is not taking pain medication.    Taking antibiotics as prescribed. Arias follow up with ID.   he denies fever, chills, and sweats .     Physical exam:    Patient arrives to exam room: walked in.  Patient is  accompanied sister   Dressing taken down.  Incision is clean, dry and intact.  Sutures some left in place .   Healing well no signs of breakdown or infection.            RADS: All pertinent images were reviewed by myself:   none done today    Assessment:  Post-op visit ( 2 weeks)    Plan:  Current care, treatment plan, precautions, activity level/ modifications, limitations, rehabilitation exercises and proposed future treatment were discussed with the patient. We discussed the need to monitor for changes in symptoms and condition and report them to the physician.  Discussed importance of compliance with all appointments and follow up examinations.     WOUND CARE :  - The patient was advised to keep the incision clean and dry -Patient was advised to monitor wound closely and multiple times daily for any problems. Call clinic immediately or report to ED for immediate medical attention for any complications including reopening of wound, drainage, purulence, redness, streaking, odor, pain out of proportion, fever, chills, etc.       ACTIVITY:   - light  -range of motion as tolerated       -PT/OT, workign on own, Patient is responsible to establish and continue care      PAIN MEDICATION:   -  Multimodal pain control  - Pain medication: refill was not needed  - Pain medication refill policy provided to patient for review, yes.    - Patient was informed a multi-modal approach is used to treat their pain. With the goal to get off of narcotic pain medication and discontinue as soon as possible.   - ice and elevation to reduce pain and swelling     DVT PROPHYLAXIS:   - Ambulatory    OTHER:   Antibiotics as prescribed by ID    FOLLOW UP:   - Patient will follow up in the clinic in 1 weeks, sooner if any concerns.  - Pending healing at time consider removal fo remaining sutures.       If there are any questions prior to scheduled follow up, the patient was instructed to contact the office         RL 1000ml

## 2023-11-22 ENCOUNTER — APPOINTMENT (OUTPATIENT)
Dept: INTERNAL MEDICINE | Facility: CLINIC | Age: 23
End: 2023-11-22

## 2023-12-08 ENCOUNTER — APPOINTMENT (OUTPATIENT)
Dept: OTOLARYNGOLOGY | Facility: CLINIC | Age: 23
End: 2023-12-08

## 2023-12-12 ENCOUNTER — APPOINTMENT (OUTPATIENT)
Dept: SURGERY | Facility: CLINIC | Age: 23
End: 2023-12-12
Payer: MEDICAID

## 2023-12-12 VITALS
RESPIRATION RATE: 17 BRPM | HEART RATE: 79 BPM | WEIGHT: 109 LBS | BODY MASS INDEX: 18.16 KG/M2 | HEIGHT: 65 IN | OXYGEN SATURATION: 96 % | DIASTOLIC BLOOD PRESSURE: 77 MMHG | SYSTOLIC BLOOD PRESSURE: 114 MMHG

## 2023-12-12 PROCEDURE — 99213 OFFICE O/P EST LOW 20 MIN: CPT

## 2023-12-12 RX ORDER — DOCOSAHEXAENOIC ACID 300 MG
50 MCG CAPSULE ORAL
Qty: 30 | Refills: 2 | Status: ACTIVE | COMMUNITY
Start: 2023-09-08 | End: 1900-01-01

## 2023-12-12 RX ORDER — CHOLECALCIFEROL (VITAMIN D3) 25 MCG
2500 TABLET,CHEWABLE ORAL
Qty: 90 | Refills: 3 | Status: ACTIVE | COMMUNITY
Start: 2023-10-10 | End: 1900-01-01

## 2023-12-27 NOTE — PROCEDURE
Physical Therapy  Evaluation and Treatment    Nela Lewis   [id number]    Time Tracking:     PT Received On: 12/27/23   PT Start Time: 1544   PT Stop Time: 1622   PT Total Time (min): 38 min    Billable Minutes: Evaluation 10, Gait Training 10, and Therapeutic Activity 18  minutes    Recommendations:     Therapy Intensity Recommendations at Discharge: No Therapy Indicated     Equipment Needed After Discharge: none    Barriers to Discharge: Inaccessible home environment (~15 steps to her 2nd story bed/bathroom)    Patient Information:     Recent Surgery: * No surgery found *      Diagnosis: Abdominal pain    Length of Stay: 1 days    General Precautions: Standard, fall  Orthopedic Precautions: N/A  Brace: N/A    Assessment:     Nela Lewis is a 16 y.o. female admitted to [facility] on 12/26/2023 for Abdominal pain. Nela Lewis tolerated evaluation well today. She was sitting up in the bedside chair, very pleasant and willing to mobilize. Endorses 8/10 generalized L flank pain (L chest tube) as well as generalized body aches, possibly from inactivity past 6 days of hospitalization. Set-up her medical lines to adult Silvia device to allow for out of bed/room activities. Able to ambulate a cumulative 450 ft (in hallways and within room) with therapist stand-by assistance, pt with both of her hands on Silvia device for UE support (managing device on her own). Reporting it feels good to mobilize, gait is steady with no losses of balance. Walked into restroom towards end of session and mom was able to provide support for voiding on toilet while PT (male) waiting outside room. Set back up into her bedside chair with all lines intact, pt feeling well, enjoys being active. Discussed with RN, keeping patient intact to Silvia device to increase activity levels during remainder of admission (especially while chest tube is intact). Discussed PT role, POC, and goals with patient and parents; verbalized understanding. Nela  "Debbie would benefit from acute PT services to promote mobility during this admission and improve return to PLOF.    Problem List: weakness, impaired endurance, impaired self care skills, impaired functional mobility, gait instability, impaired balance, pain, decreased upper extremity function, decreased ROM, impaired cardiopulmonary response to activity    Rehab Prognosis: Good; patient would benefit from acute skilled PT services to address these deficits and reach maximum level of function.    Plan:     Patient to be seen 4 x/week to address the above listed problems via gait training, therapeutic activities, therapeutic exercises, neuromuscular re-education    Plan of Care Expires: 01/26/24  Plan of Care reviewed with: patient, mother, father    Subjective:     Communicated with RN Magno prior to evaluation, appropriate to see for evaluation.    Pt found reclined in bedside chair upon PT entry to room, agreeable to evaluation.    Patient commenting: "I was walking around at the other hospital until I got my chest tube on Latonia Chapis, I've been pretty much been in bed since then."    Does this patient have any cultural, spiritual, Mosque conflicts given the current situation? Patient has no barriers to learning. Patient verbalizes understanding of his/her program and goals and demonstrates them correctly. No cultural, spiritual, or educational needs identified.    No past medical history on file. No past surgical history on file.    Living Environment:  Pt lives with her parents in a 2 SH with 0 SUKUMAR, full flight (~15 steps) leading to her 2nd story bed/bathroom, has a handrail available.    PLOF:  Prior to admission, patient was independent with age-appropriate mobility and self-care. 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 Gets straight A's in high school.    DME:  Patient owns or has access to the following DME: none    Upon discharge, patient will have " assistance from parents.    Objective:     Patient found with: pulse ox (continuous), telemetry, NG tube, chest tube x 1 (L), PICC line    Pain:  Pain Rating 1: 8/10  Location - Side 1: Left  Location - Orientation 1: generalized  Location 1: flank  Pain Addressed 1: Reposition, Distraction, Nurse notified  Pain Rating Post-Intervention 1: 8/10    Cognitive Exam:  Patient is oriented to Person, Place, Time, and Situation.  Patient follows 100% of single-step commands.    Sensation:   Intact at BLE to light touch    Lower Extremity Range of Motion:  Right Lower Extremity: WFL actively  Left Lower Extremity: WFL actively    Lower Extremity Strength:  Right Lower Extremity: grossly 4/5 via MMT  Left Lower Extremity: grossly 4/5 via MMT    Functional Mobility:    Bed Mobility:  NT; patient sitting up out of bed before and after session    Transfers:  Sit to Stand: Stand-By Assist from bedside chair with no AD x 1 trial(s)  Toilet Transfer: Stand-By Assist to transition on/off toilet/commode with no AD x 1 trial(s)    Gait:  450 feet in hallways and within room) with therapist stand-by assistance, pt with both of her hands on Silvia device for UE support (managing device on her own).  Reporting it feels good to mobilize, gait is steady with no losses of balance  NG tube and chest tube intact but both off suction to mobilize, re-attached to suction at end once in chair    Assist level: Stand-By Assist  Device:  pt's hands on adult Silvia device for UE support (manages her own device)    Balance:  Static Sit: Independent at edge of chair or toilet    Static Stand: Stand-By Assist with no AD    Additional Therapeutic Activity/Exercises:     1. She was sitting up in the bedside chair, very pleasant and willing to mobilize. Endorses 8/10 generalized L flank pain (L chest tube) as well as generalized body aches, possibly from inactivity past 6 days of hospitalization.    2. Set-up her medical lines to adult Silvia  device to allow for out of bed/room activities. Able to ambulate a cumulative 450 ft (in hallways and within room) with therapist stand-by assistance, pt with both of her hands on Silvia device for UE support (managing device on her own). Reporting it feels good to mobilize, gait is steady with no losses of balance.    3. Walked into restroom towards end of session and mom was able to provide support for voiding on toilet while PT (male) waiting outside room. Set back up into her bedside chair with all lines intact, pt feeling well, enjoys being active. Discussed with RN, keeping patient intact to Silvia device to increase activity levels during remainder of admission (especially while chest tube is intact).    4. Discussed PT role, POC, and goals with patient and parents; verbalized understanding.    Patient was left reclined in bedside chair with all lines intact, call button in reach, RN notified, and parents present.    Clinical Decision Making for Evaluation Complexity:  1. Body System(s) Examination: 1-2  2. Clinical Presentation: Evolving  3. Evaluation Complexity: Low    GOALS:   Multidisciplinary Problems       Physical Therapy Goals          Problem: Physical Therapy    Goal Priority Disciplines Outcome Goal Variances Interventions   Physical Therapy Goal     PT, PT/OT      Description: Goals to be met by: 1/10/24     Patient will increase functional independence with mobility by performin. Supine to sit with Bowman - Not met  2. Sit to stand transfer with [place] from bedside chair - Not met  3. Gait  x 1,000 feet with Supervision using No Assistive Device - Not met  4. Ascend/descend 1 flight of stairs with a unilateral Handrail with Stand-by Assistance using No Assistive Device - Not met                     Parvez Ngo, PT  2023   [FreeTextEntry3] : Procedure note:  Left cerumenectomy\par \par Feb 07, 2020 \par \par Description of Procedure:  Cerumen impaction was noted requiring debridement under the operating microscope using otologic instrumentation.  The patient tolerated the procedure without complications.\par \par

## 2024-02-07 ENCOUNTER — APPOINTMENT (OUTPATIENT)
Dept: DERMATOLOGY | Facility: CLINIC | Age: 24
End: 2024-02-07
Payer: MEDICAID

## 2024-02-07 PROCEDURE — 99214 OFFICE O/P EST MOD 30 MIN: CPT

## 2024-02-07 RX ORDER — TRETINOIN 0.25 MG/G
0.03 CREAM TOPICAL
Qty: 1 | Refills: 6 | Status: ACTIVE | COMMUNITY
Start: 2024-02-07 | End: 1900-01-01

## 2024-02-07 RX ORDER — CLINDAMYCIN PHOSPHATE 10 MG/ML
1 LOTION TOPICAL
Qty: 1 | Refills: 5 | Status: ACTIVE | COMMUNITY
Start: 2024-02-07 | End: 1900-01-01

## 2024-02-09 ENCOUNTER — APPOINTMENT (OUTPATIENT)
Dept: OTOLARYNGOLOGY | Facility: CLINIC | Age: 24
End: 2024-02-09
Payer: MEDICAID

## 2024-02-09 VITALS — BODY MASS INDEX: 18.16 KG/M2 | HEIGHT: 65 IN | WEIGHT: 109 LBS

## 2024-02-09 DIAGNOSIS — H90.12 CONDUCTIVE HEARING LOSS, UNILATERAL, LEFT EAR, WITH UNRESTRICTED HEARING ON THE CONTRALATERAL SIDE: ICD-10-CM

## 2024-02-09 DIAGNOSIS — H90.8 MIXED CONDUCTIVE AND SENSORINEURAL HEARING LOSS, UNSPECIFIED: ICD-10-CM

## 2024-02-09 DIAGNOSIS — H61.23 IMPACTED CERUMEN, BILATERAL: ICD-10-CM

## 2024-02-09 DIAGNOSIS — H60.313 DIFFUSE OTITIS EXTERNA, BILATERAL: ICD-10-CM

## 2024-02-09 DIAGNOSIS — H69.93 UNSPECIFIED EUSTACHIAN TUBE DISORDER, BILATERAL: ICD-10-CM

## 2024-02-09 PROCEDURE — 99213 OFFICE O/P EST LOW 20 MIN: CPT | Mod: 25

## 2024-02-09 PROCEDURE — 69210 REMOVE IMPACTED EAR WAX UNI: CPT

## 2024-02-09 NOTE — REASON FOR VISIT
[Subsequent Evaluation] : a subsequent evaluation for [FreeTextEntry2] : Right meatoplasty, right bony canaloplasty, tissue graft, use of operative  microscope 04/12/23

## 2024-02-09 NOTE — PROCEDURE
[FreeTextEntry3] : Patient procedure note:  Bilateral cerumenectomy  Description of Procedure:  Cerumen impaction was noted requiring debridement under the operating microscope using otologic instrumentation.  This procedure was repeated in the contralateral ear.  The patient tolerated the procedure without complications.

## 2024-02-09 NOTE — ASSESSMENT
[FreeTextEntry1] : Reports left ear clogged sensation since last visit.  Otoscopic exam shows cerumen in both ears, bilateral meatoplasty widely patent.  Bilateral tympanic membranes intact without effusion or retraction.  Continue maintenance of dry ear precautions, follow-up 1 or twice yearly for ear cleanings.

## 2024-02-09 NOTE — HISTORY OF PRESENT ILLNESS
[de-identified] : 23 year old female presents for follow up of clogged ears S/p Right meatoplasty, right bony canaloplasty, tissue graft, use of operative  microscope on 04/12/23 Patient states that both ears feel clogged. States having slight bilateral tinnitus  Hearing stable from last visit. Patient denies otalgia, otorrhea, ear infections, dizziness, vertigo, headaches related to hearing.

## 2024-02-21 DIAGNOSIS — E55.9 VITAMIN D DEFICIENCY, UNSPECIFIED: ICD-10-CM

## 2024-02-21 DIAGNOSIS — Z90.3 ACQUIRED ABSENCE OF STOMACH [PART OF]: ICD-10-CM

## 2024-02-24 NOTE — ASU PREOP CHECKLIST - TAMPON REMOVED
"Pt's daughter at bedside. Pt irritable about BIPAP and being unable to \"clean my nose.\"  Attempted to explain to pt that it is important to leave mask in place as he is critically ill. Pt states \"leave me alone,\" dismissing RN from room.  " n/a

## 2024-03-04 NOTE — ASU PREOP CHECKLIST - ANTIBIOTIC
Preventive Care Visit  [facility] ANURAG Randhawa CNP, Family Practice  Mar 4, 2024    Assessment & Plan   11 year old 0 month old, here for preventive care.      ICD-10-CM    1. Encounter for routine child health examination w/o abnormal findings  Z00.129 BEHAVIORAL/EMOTIONAL ASSESSMENT (87529)     SCREENING TEST, PURE TONE, AIR ONLY     SCREENING, VISUAL ACUITY, QUANTITATIVE, BILAT      2. Seasonal allergic rhinitis, unspecified trigger  J30.2 cetirizine (ZYRTEC) 5 MG CHEW chewable tablet      3. Menorrhagia with regular cycle  N92.0 Hemoglobin      4. Constipation, unspecified constipation type  K59.00 polyethylene glycol (MIRALAX) 17 GM/Dose powder         Growth      Normal height and weight  Pediatric Healthy Lifestyle Action Plan           Immunizations   Vaccines up to date.    Anticipatory Guidance    Reviewed age appropriate anticipatory guidance.           Referrals/Ongoing Specialty Care  None  Verbal Dental Referral: Patient has established dental home        Subjective   Ann Marie is presenting for the following:  Well Child    Menorrhagia with regular cycle  Heavy menses with regular cycle.  No symptoms of fatigue.  Screen TSH and Hgb.      Constipation, unspecified constipation type  Ongoing constipation--using miralax routinely and needs refill.  Also will check TSH as she has heavy menses as well.    Seasonal allergic rhinitis  Was started on montelukast in distant past.  Has never used OTC antihistamines.  Will switch to cetirizine and if not controlled she can follow up in clinic.          3/4/2024     3:52 PM   Additional Questions   Accompanied by Mother  and  aunt   Questions for today's visit No   Surgery, major illness, or injury since last physical No           3/4/2024   Social   Lives with Parent(s)   Recent potential stressors None   History of trauma No   Family Hx of mental health challenges No   Lack of transportation has limited access to appts/meds No   Do  you have housing?  Yes   Are you worried about losing your housing? No         3/4/2024     3:55 PM   Health Risks/Safety   Where does your adolescent sit in the car? Back seat   Does your adolescent always wear a seat belt? Yes   Helmet use? Yes            3/4/2024     3:55 PM   TB Screening: Consider immunosuppression as a risk factor for TB   Recent TB infection or positive TB test in family/close contacts No   Recent travel outside [place] (child/family/close contacts) No   Recent residence in high-risk group setting (correctional facility/health care facility/homeless shelter/refugee camp) No          3/4/2024     3:55 PM   Dyslipidemia   FH: premature cardiovascular disease No, these conditions are not present in the patient's biologic parents or grandparents   FH: hyperlipidemia No   Personal risk factors for heart disease NO diabetes, high blood pressure, obesity, smokes cigarettes, kidney problems, heart or kidney transplant, history of Kawasaki disease with an aneurysm, lupus, rheumatoid arthritis, or HIV         3/4/2024     3:55 PM   Sudden Cardiac Arrest and Sudden Cardiac Death Screening   History of syncope/seizure No   History of exercise-related chest pain or shortness of breath No   FH: premature death (sudden/unexpected or other) attributable to heart diseases No   FH: cardiomyopathy, ion channelopothy, Marfan syndrome, or arrhythmia No         3/4/2024     3:55 PM   Dental Screening   Has your adolescent seen a dentist? Yes   When was the last visit? Within the last 3 months   Has your adolescent had cavities in the last 3 years? No   Has your adolescent s parent(s), caregiver, or sibling(s) had any cavities in the last 2 years?  No         3/4/2024   Diet   Do you have questions about your adolescent's eating?  No   Do you have questions about your adolescent's height or weight? No   What does your adolescent regularly drink? Water    (!) JUICE   How often does your family eat meals together? Every  "day   Servings of fruits/vegetables per day (!) 1-2   At least 3 servings of food or beverages that have calcium each day? Yes   In past 12 months, concerned food might run out No   In past 12 months, food has run out/couldn't afford more No           3/4/2024   Activity   Days per week of moderate/strenuous exercise 7 days   On average, how many minutes do you engage in exercise at this level? 20 min   What does your adolescent do for exercise?  jumping jacks hoolauping and jumprooping   What activities is your adolescent involved with?  basketball vollyball         3/4/2024     3:55 PM   Media Use   Hours per day of screen time (for entertainment) 1 hour or 2   Screen in bedroom No         3/4/2024     3:55 PM   Sleep   Does your adolescent have any trouble with sleep? No   Daytime sleepiness/naps No         3/4/2024     3:55 PM   School   School concerns No concerns   Grade in school 6th Grade   Current school [employer]   School absences (>2 days/mo) No         3/4/2024     3:55 PM   Vision/Hearing   Vision or hearing concerns No concerns         3/4/2024     3:55 PM   Development / Social-Emotional Screen   Developmental concerns No     Psycho-Social/Depression - PSC-17 required for C&TC through age 18  General screening:  Electronic PSC       3/4/2024     3:56 PM   PSC SCORES   Inattentive / Hyperactive Symptoms Subtotal 0   Externalizing Symptoms Subtotal 0   Internalizing Symptoms Subtotal 0   PSC - 17 Total Score 0       Follow up:  no follow up necessary  Teen Screen    Teen Screen completed, reviewed and scanned document within chart        3/4/2024     3:55 PM   AMB WCC MENSES SECTION   What are your adolescent's periods like?  Regular          Objective     Exam  BP 98/56   Pulse 91   Temp 98.2  F (36.8  C) (Oral)   Resp 16   Ht 1.651 m (5' 5\")   Wt 65.7 kg (144 lb 14.4 oz)   SpO2 100%   BMI 24.11 kg/m    97 %ile (Z= 1.86) based on CDC (Girls, 2-20 Years) Stature-for-age data based " on Stature recorded on 3/4/2024.  97 %ile (Z= 1.87) based on [facility] (Girls, 2-20 Years) weight-for-age data using vitals from 3/4/2024.  93 %ile (Z= 1.47) based on [facility] (Girls, 2-20 Years) BMI-for-age based on BMI available as of 3/4/2024.  Blood pressure %romeo are 18% systolic and 23% diastolic based on the 2017 AAP Clinical Practice Guideline. This reading is in the normal blood pressure range.    Vision Screen  Vision Screen Details  Reason Vision Screen Not Completed: Patient had exam in last 12 months    Hearing Screen  RIGHT EAR  1000 Hz on Level 40 dB (Conditioning sound): Pass  1000 Hz on Level 20 dB: Pass  2000 Hz on Level 20 dB: Pass  4000 Hz on Level 20 dB: Pass  6000 Hz on Level 20 dB: Pass  8000 Hz on Level 20 dB: Pass  LEFT EAR  8000 Hz on Level 20 dB: Pass  6000 Hz on Level 20 dB: Pass  4000 Hz on Level 20 dB: Pass  2000 Hz on Level 20 dB: Pass  1000 Hz on Level 20 dB: Pass  500 Hz on Level 25 dB: Pass  RIGHT EAR  500 Hz on Level 25 dB: Pass  Results  Hearing Screen Results: Pass      Physical Exam  GENERAL: Active, alert, in no acute distress.  SKIN: Clear. No significant rash, abnormal pigmentation or lesions  HEAD: Normocephalic  EYES: Pupils equal, round, reactive, Extraocular muscles intact. Normal conjunctivae.  EARS: Normal canals. Tympanic membranes are normal; gray and translucent.  NOSE: Normal without discharge.  MOUTH/THROAT: Clear. No oral lesions. Teeth without obvious abnormalities.  NECK: Supple, no masses.  No thyromegaly.  LYMPH NODES: No adenopathy  LUNGS: Clear. No rales, rhonchi, wheezing or retractions  HEART: Regular rhythm. Normal S1/S2. No murmurs. Normal pulses.  ABDOMEN: Soft, non-tender, not distended, no masses or hepatosplenomegaly. Bowel sounds normal.   NEUROLOGIC: No focal findings. Cranial nerves grossly intact: DTR's normal. Normal gait, strength and tone  BACK: Spine is straight, no scoliosis.  EXTREMITIES: Full range of motion, no deformities  : Exam declined by  parent/patient.  Reason for decline: Patient/Parental preference      Signed Electronically by: ANURAG Samuel CNP     n/a

## 2024-03-06 ENCOUNTER — APPOINTMENT (OUTPATIENT)
Dept: INTERNAL MEDICINE | Facility: CLINIC | Age: 24
End: 2024-03-06
Payer: MEDICAID

## 2024-03-06 LAB
25(OH)D3 SERPL-MCNC: 36.4 NG/ML
ALBUMIN SERPL ELPH-MCNC: 4.4 G/DL
ALP BLD-CCNC: 80 U/L
ALT SERPL-CCNC: 13 U/L
ANION GAP SERPL CALC-SCNC: 11 MMOL/L
AST SERPL-CCNC: 15 U/L
BASOPHILS # BLD AUTO: 0.05 K/UL
BASOPHILS NFR BLD AUTO: 0.9 %
BILIRUB SERPL-MCNC: 0.7 MG/DL
BUN SERPL-MCNC: 13 MG/DL
CALCIUM SERPL-MCNC: 9.6 MG/DL
CHLORIDE SERPL-SCNC: 105 MMOL/L
CHOLEST SERPL-MCNC: 132 MG/DL
CO2 SERPL-SCNC: 25 MMOL/L
CREAT SERPL-MCNC: 0.66 MG/DL
EGFR: 126 ML/MIN/1.73M2
EOSINOPHIL # BLD AUTO: 0.08 K/UL
EOSINOPHIL NFR BLD AUTO: 1.5 %
ESTIMATED AVERAGE GLUCOSE: 117 MG/DL
FERRITIN SERPL-MCNC: 21 NG/ML
FOLATE SERPL-MCNC: 18.9 NG/ML
GLUCOSE SERPL-MCNC: 84 MG/DL
HBA1C MFR BLD HPLC: 5.7 %
HCT VFR BLD CALC: 42 %
HDLC SERPL-MCNC: 57 MG/DL
HGB BLD-MCNC: 13.3 G/DL
IMM GRANULOCYTES NFR BLD AUTO: 0.2 %
IRON SATN MFR SERPL: 23 %
IRON SERPL-MCNC: 89 UG/DL
LDLC SERPL CALC-MCNC: 62 MG/DL
LYMPHOCYTES # BLD AUTO: 1.91 K/UL
LYMPHOCYTES NFR BLD AUTO: 35.5 %
MAN DIFF?: NORMAL
MCHC RBC-ENTMCNC: 29 PG
MCHC RBC-ENTMCNC: 31.7 GM/DL
MCV RBC AUTO: 91.7 FL
MONOCYTES # BLD AUTO: 0.45 K/UL
MONOCYTES NFR BLD AUTO: 8.4 %
NEUTROPHILS # BLD AUTO: 2.88 K/UL
NEUTROPHILS NFR BLD AUTO: 53.5 %
NONHDLC SERPL-MCNC: 75 MG/DL
PLATELET # BLD AUTO: 275 K/UL
POTASSIUM SERPL-SCNC: 4.3 MMOL/L
PROT SERPL-MCNC: 7 G/DL
RBC # BLD: 4.58 M/UL
RBC # FLD: 12.7 %
SODIUM SERPL-SCNC: 141 MMOL/L
TIBC SERPL-MCNC: 379 UG/DL
TRIGL SERPL-MCNC: 63 MG/DL
TSH SERPL-ACNC: 0.89 UIU/ML
UIBC SERPL-MCNC: 290 UG/DL
VIT B12 SERPL-MCNC: 577 PG/ML
WBC # FLD AUTO: 5.38 K/UL

## 2024-03-06 PROCEDURE — 36415 COLL VENOUS BLD VENIPUNCTURE: CPT

## 2024-03-13 ENCOUNTER — APPOINTMENT (OUTPATIENT)
Dept: INTERNAL MEDICINE | Facility: CLINIC | Age: 24
End: 2024-03-13
Payer: MEDICAID

## 2024-03-13 VITALS
HEART RATE: 77 BPM | BODY MASS INDEX: 20.16 KG/M2 | WEIGHT: 121 LBS | OXYGEN SATURATION: 92 % | DIASTOLIC BLOOD PRESSURE: 81 MMHG | SYSTOLIC BLOOD PRESSURE: 119 MMHG | HEIGHT: 65 IN

## 2024-03-13 DIAGNOSIS — Z00.00 ENCOUNTER FOR GENERAL ADULT MEDICAL EXAMINATION W/OUT ABNORMAL FINDINGS: ICD-10-CM

## 2024-03-13 DIAGNOSIS — R05.9 COUGH, UNSPECIFIED: ICD-10-CM

## 2024-03-13 DIAGNOSIS — N64.4 MASTODYNIA: ICD-10-CM

## 2024-03-13 PROCEDURE — 99395 PREV VISIT EST AGE 18-39: CPT

## 2024-03-13 PROCEDURE — 99214 OFFICE O/P EST MOD 30 MIN: CPT | Mod: 25

## 2024-03-13 RX ORDER — IRON POLYSACCHARIDE COMPLEX 150 MG
150 CAPSULE ORAL
Qty: 30 | Refills: 2 | Status: ACTIVE | COMMUNITY
Start: 2023-09-08 | End: 1900-01-01

## 2024-03-13 RX ORDER — CLOTRIMAZOLE AND BETAMETHASONE DIPROPIONATE 10; .5 MG/G; MG/G
1-0.05 CREAM TOPICAL
Qty: 1 | Refills: 0 | Status: ACTIVE | COMMUNITY
Start: 2023-02-14 | End: 1900-01-01

## 2024-03-13 NOTE — HISTORY OF PRESENT ILLNESS
[FreeTextEntry1] : Annual [de-identified] : 23 y F with GERD, pre-DM, s/p gastric sleeve presents for annual.   She also has several concerns  breast pain LMP 2/18, pain constant daily for past 2 years. Had US breast last year that was normal. Does drink coffee daily-- 2 cups.  vaginal itching, started few days ago, needs refill of cream. External, not internal. No abnormal discharge.  sore throat, fever, cough x 2 days. coworker sick but no one at home. Pt took tylenol. Unknown temp.   Has not had pap-- GYN no longer accepts insurance.

## 2024-03-13 NOTE — PHYSICAL EXAM
[de-identified] : mildly erythematous oropharynx, no tonsillar hypertrophy or exudates.  [Normal] : affect was normal and insight and judgment were intact [de-identified] : small posterior cervical LN

## 2024-03-13 NOTE — REVIEW OF SYSTEMS
[Fever] : fever [Cough] : cough [Sore Throat] : sore throat [Negative] : Psychiatric [FreeTextEntry1] : per HPI

## 2024-03-14 ENCOUNTER — TRANSCRIPTION ENCOUNTER (OUTPATIENT)
Age: 24
End: 2024-03-14

## 2024-03-14 LAB
INFLUENZA A RESULT: NOT DETECTED
INFLUENZA B RESULT: NOT DETECTED
RESP SYN VIRUS RESULT: NOT DETECTED
SARS-COV-2 RESULT: NOT DETECTED

## 2024-03-26 ENCOUNTER — OUTPATIENT (OUTPATIENT)
Dept: OUTPATIENT SERVICES | Facility: HOSPITAL | Age: 24
LOS: 1 days | End: 2024-03-26
Payer: MEDICAID

## 2024-03-26 ENCOUNTER — APPOINTMENT (OUTPATIENT)
Dept: INTERNAL MEDICINE | Facility: CLINIC | Age: 24
End: 2024-03-26
Payer: MEDICAID

## 2024-03-26 ENCOUNTER — RESULT REVIEW (OUTPATIENT)
Age: 24
End: 2024-03-26

## 2024-03-26 ENCOUNTER — APPOINTMENT (OUTPATIENT)
Dept: ULTRASOUND IMAGING | Facility: CLINIC | Age: 24
End: 2024-03-26
Payer: MEDICAID

## 2024-03-26 VITALS
DIASTOLIC BLOOD PRESSURE: 82 MMHG | OXYGEN SATURATION: 100 % | HEIGHT: 65 IN | BODY MASS INDEX: 20.16 KG/M2 | TEMPERATURE: 97.8 F | WEIGHT: 121 LBS | SYSTOLIC BLOOD PRESSURE: 119 MMHG | HEART RATE: 65 BPM

## 2024-03-26 DIAGNOSIS — N89.8 OTHER SPECIFIED NONINFLAMMATORY DISORDERS OF VAGINA: ICD-10-CM

## 2024-03-26 DIAGNOSIS — M26.02 MAXILLARY HYPOPLASIA: Chronic | ICD-10-CM

## 2024-03-26 DIAGNOSIS — Z90.3 ACQUIRED ABSENCE OF STOMACH [PART OF]: Chronic | ICD-10-CM

## 2024-03-26 DIAGNOSIS — Z12.4 ENCOUNTER FOR SCREENING FOR MALIGNANT NEOPLASM OF CERVIX: ICD-10-CM

## 2024-03-26 DIAGNOSIS — N76.0 ACUTE VAGINITIS: ICD-10-CM

## 2024-03-26 DIAGNOSIS — Z11.1 ENCOUNTER FOR SCREENING FOR RESPIRATORY TUBERCULOSIS: ICD-10-CM

## 2024-03-26 DIAGNOSIS — Z98.890 OTHER SPECIFIED POSTPROCEDURAL STATES: Chronic | ICD-10-CM

## 2024-03-26 DIAGNOSIS — B96.89 ACUTE VAGINITIS: ICD-10-CM

## 2024-03-26 DIAGNOSIS — Z00.8 ENCOUNTER FOR OTHER GENERAL EXAMINATION: ICD-10-CM

## 2024-03-26 PROCEDURE — 99214 OFFICE O/P EST MOD 30 MIN: CPT | Mod: 25

## 2024-03-26 PROCEDURE — 36415 COLL VENOUS BLD VENIPUNCTURE: CPT

## 2024-03-26 PROCEDURE — 76641 ULTRASOUND BREAST COMPLETE: CPT | Mod: 26,LT

## 2024-03-26 PROCEDURE — 76641 ULTRASOUND BREAST COMPLETE: CPT

## 2024-03-26 RX ORDER — CLOTRIMAZOLE AND BETAMETHASONE DIPROPIONATE 10; .5 MG/G; MG/G
1-0.05 CREAM TOPICAL TWICE DAILY
Qty: 1 | Refills: 3 | Status: ACTIVE | COMMUNITY
Start: 2024-03-26 | End: 1900-01-01

## 2024-03-26 NOTE — PLAN
[FreeTextEntry1] : Discussed etiology and treatment of recurrent bacterial vaginosis, including factors that change the vaginal pH (such as frequent intercourse with ejaculate, menstrual cycle timing), infectious factors (such as sex toys and anal sex), and self-care, (including boric acid suppositories, Replens once weekly) with charting of cycle and coitus, stressors, alongside BV symptoms.  The benefits of adequate calcium intake and a daily multivitamin along with routine daily cardiovascular exercise were reviewed with the patient.  The patient was informed regarding the benefits of a YEARLY screening FOR SKIN CANCER  The importance of safer-sex was discussed with the patient. We reviewed ASCCP/ACOG guidelines for pap smears. DISCUSSED PRECAUTIONS AGAINST COVID19, INCLUDING MASK WEARING, SOCIAL DISTANCING AND HAND WASHING.  PAP DONE             -Follow up in 1 year for annual GYN exam or PRN. All questions and concerns were discussed.

## 2024-03-26 NOTE — HISTORY OF PRESENT ILLNESS
[FreeTextEntry1] : Patient presents for follow up evaluation for multiple medical concerns. [de-identified] : Patient presents for follow up evaluation for multiple medical concerns including: FORMS FOR WORK VAGINAL ITCHING - ON AND OFF FOR WHILE PAP TEST -

## 2024-03-26 NOTE — PHYSICAL EXAM
[Normal TMs] : both tympanic membranes were normal [Supple] : supple [Clear to Auscultation] : lungs were clear to auscultation bilaterally [Normal S1, S2] : normal S1 and S2 [Urethral Meatus] : normal urethra [Urinary Bladder Findings] : the bladder was normal on palpation [Vagina] : normal vaginal exam [Cervix] : normal cervix [Uterus] : uterus was normal size, without masses or tenderness [Uterine Adnexae] : normal adnexa [Anus Abnormality] : the anus and perineum were normal [Normal] : affect was normal and insight and judgment were intact

## 2024-03-27 LAB
HBV SURFACE AB SER QL: NONREACTIVE
HBV SURFACE AG SER QL: NONREACTIVE
MEV IGG FLD QL IA: 271 AU/ML
MEV IGG+IGM SER-IMP: POSITIVE
MUV AB SER-ACNC: POSITIVE
MUV IGG SER QL IA: 80.7 AU/ML
RUBV IGG FLD-ACNC: 1.9 INDEX
RUBV IGG SER-IMP: POSITIVE
VZV AB TITR SER: POSITIVE
VZV IGG SER IF-ACNC: 400.1 INDEX

## 2024-03-28 LAB
C TRACH RRNA SPEC QL NAA+PROBE: NOT DETECTED
CYTOLOGY CVX/VAG DOC THIN PREP: NORMAL
HPV HIGH+LOW RISK DNA PNL CVX: NOT DETECTED
N GONORRHOEA RRNA SPEC QL NAA+PROBE: NOT DETECTED
SOURCE TP AMPLIFICATION: NORMAL

## 2024-04-01 LAB
M TB IFN-G BLD-IMP: NEGATIVE
QUANTIFERON TB PLUS MITOGEN MINUS NIL: 2.34 IU/ML
QUANTIFERON TB PLUS NIL: 0.02 IU/ML
QUANTIFERON TB PLUS TB1 MINUS NIL: 0 IU/ML
QUANTIFERON TB PLUS TB2 MINUS NIL: 0.05 IU/ML

## 2024-04-02 ENCOUNTER — NON-APPOINTMENT (OUTPATIENT)
Age: 24
End: 2024-04-02

## 2024-04-05 ENCOUNTER — APPOINTMENT (OUTPATIENT)
Dept: ENDOCRINOLOGY | Facility: CLINIC | Age: 24
End: 2024-04-05
Payer: MEDICAID

## 2024-04-05 VITALS — DIASTOLIC BLOOD PRESSURE: 86 MMHG | HEART RATE: 77 BPM | SYSTOLIC BLOOD PRESSURE: 125 MMHG | WEIGHT: 112 LBS

## 2024-04-05 DIAGNOSIS — E04.9 NONTOXIC GOITER, UNSPECIFIED: ICD-10-CM

## 2024-04-05 PROCEDURE — 36415 COLL VENOUS BLD VENIPUNCTURE: CPT

## 2024-04-05 PROCEDURE — 99204 OFFICE O/P NEW MOD 45 MIN: CPT | Mod: 25

## 2024-04-05 RX ORDER — DOXYCYCLINE HYCLATE 100 MG/1
100 CAPSULE ORAL
Qty: 84 | Refills: 0 | Status: DISCONTINUED | COMMUNITY
Start: 2024-02-07 | End: 2024-04-05

## 2024-04-05 NOTE — PHYSICAL EXAM
[Alert] : alert [Well Nourished] : well nourished [Normal Sclera/Conjunctiva] : normal sclera/conjunctiva [PERRL] : pupils equal, round and reactive to light [Normal Hearing] : hearing was normal [No Neck Mass] : no neck mass was observed [Thyroid Not Enlarged] : the thyroid was not enlarged [No Respiratory Distress] : no respiratory distress [Clear to Auscultation] : lungs were clear to auscultation bilaterally [Normal Rate] : heart rate was normal [Normal Bowel Sounds] : normal bowel sounds [Not Tender] : non-tender [Soft] : abdomen soft [Normal Gait] : normal gait [No Joint Swelling] : no joint swelling seen [No Rash] : no rash [Normal Reflexes] : deep tendon reflexes were 2+ and symmetric [Oriented x3] : oriented to person, place, and time [Normal Insight/Judgement] : insight and judgment were intact

## 2024-04-05 NOTE — ASSESSMENT
[FreeTextEntry1] : This is a  23  year old female with previous history of hyperandrogenism here for evaluation for PCOS.  Given the [place] criteria she meets  2/3, she has ovarian imaging and hyperandrogenism. Will be further evaluating for other causes such as non-classical CAH, hyperprolactinemia.. Metabolic parameters such as diabetes and lipid profile will be screened as well.   -Check testosterone, prolactin, DHEA-s, 17-OHP, HgA1C, lipid profile -Will evaluate necessity for Metformin after checking HgA1C  -Will call back with results  -Extensive counseling on long term life style modifications such has decreased carbohydrates in diet, maintenance of good body weight and incorporation of exercise  Enlarged thyroid -Will order US of the neck    -Follow up in 6 months with telehealth

## 2024-04-05 NOTE — HISTORY OF PRESENT ILLNESS
[FreeTextEntry1] : 23 year old female with history of hirsutism here for evaluation   Patient reported excessive facial hair  Increased acne on tretinoin Thick hair in the inner thighs and lower back   Menarche: Age 10   Periods are regular 30 days and heavier  Some follicles on the ovaries on last pelvic US

## 2024-04-05 NOTE — REVIEW OF SYSTEMS
[Fatigue] : no fatigue [Decreased Appetite] : appetite not decreased [Recent Weight Gain (___ Lbs)] : no recent weight gain [Recent Weight Loss (___ Lbs)] : no recent weight loss [Visual Field Defect] : no visual field defect [Dysphagia] : no dysphagia [Chest Pain] : no chest pain [Palpitations] : no palpitations [Shortness Of Breath] : no shortness of breath [Nausea] : no nausea [Vomiting] : no vomiting [Polyuria] : no polyuria [Irregular Menses] : regular menses [Joint Pain] : no joint pain [Acne] : acne [Hirsutism] : hirsutism [Headaches] : no headaches [Depression] : no depression [Polydipsia] : no polydipsia [Easy Bleeding] : no ~M tendency for easy bleeding

## 2024-04-10 ENCOUNTER — NON-APPOINTMENT (OUTPATIENT)
Age: 24
End: 2024-04-10

## 2024-04-12 LAB
ALBUMIN SERPL ELPH-MCNC: 4.8 G/DL
ALP BLD-CCNC: 96 U/L
ALT SERPL-CCNC: 11 U/L
ANDROST SERPL-MCNC: 80 NG/DL
ANION GAP SERPL CALC-SCNC: 17 MMOL/L
AST SERPL-CCNC: 14 U/L
BILIRUB SERPL-MCNC: 0.5 MG/DL
BUN SERPL-MCNC: 14 MG/DL
CALCIUM SERPL-MCNC: 10.1 MG/DL
CHLORIDE SERPL-SCNC: 102 MMOL/L
CHOLEST SERPL-MCNC: 144 MG/DL
CO2 SERPL-SCNC: 21 MMOL/L
CREAT SERPL-MCNC: 0.72 MG/DL
EGFR: 120 ML/MIN/1.73M2
ESTIMATED AVERAGE GLUCOSE: 114 MG/DL
ESTRADIOL SERPL-MCNC: 102 PG/ML
FSH SERPL-MCNC: 4.4 IU/L
GLUCOSE SERPL-MCNC: 82 MG/DL
HBA1C MFR BLD HPLC: 5.6 %
HDLC SERPL-MCNC: 65 MG/DL
LDLC SERPL CALC-MCNC: 66 MG/DL
LH SERPL-ACNC: 11.1 IU/L
NONHDLC SERPL-MCNC: 79 MG/DL
POTASSIUM SERPL-SCNC: 5.3 MMOL/L
PROLACTIN SERPL-MCNC: 16.6 NG/ML
PROT SERPL-MCNC: 7.3 G/DL
SODIUM SERPL-SCNC: 141 MMOL/L
TESTOST FREE SERPL-MCNC: 0.9 PG/ML
TESTOST SERPL-MCNC: 18.5 NG/DL
TRIGL SERPL-MCNC: 64 MG/DL

## 2024-04-15 ENCOUNTER — APPOINTMENT (OUTPATIENT)
Dept: ULTRASOUND IMAGING | Facility: CLINIC | Age: 24
End: 2024-04-15

## 2024-04-16 LAB — DHEA-SULFATE, SERUM: 221 UG/DL

## 2024-04-29 ENCOUNTER — APPOINTMENT (OUTPATIENT)
Dept: INTERNAL MEDICINE | Facility: CLINIC | Age: 24
End: 2024-04-29

## 2024-04-30 ENCOUNTER — APPOINTMENT (OUTPATIENT)
Dept: ULTRASOUND IMAGING | Facility: CLINIC | Age: 24
End: 2024-04-30
Payer: MEDICAID

## 2024-04-30 PROCEDURE — 76536 US EXAM OF HEAD AND NECK: CPT

## 2024-05-09 ENCOUNTER — TRANSCRIPTION ENCOUNTER (OUTPATIENT)
Age: 24
End: 2024-05-09

## 2024-05-16 ENCOUNTER — APPOINTMENT (OUTPATIENT)
Dept: INTERNAL MEDICINE | Facility: CLINIC | Age: 24
End: 2024-05-16
Payer: MEDICAID

## 2024-05-16 VITALS
HEART RATE: 97 BPM | SYSTOLIC BLOOD PRESSURE: 112 MMHG | BODY MASS INDEX: 18.66 KG/M2 | WEIGHT: 112 LBS | DIASTOLIC BLOOD PRESSURE: 76 MMHG | HEIGHT: 65 IN | OXYGEN SATURATION: 99 %

## 2024-05-16 DIAGNOSIS — R35.0 FREQUENCY OF MICTURITION: ICD-10-CM

## 2024-05-16 DIAGNOSIS — Z78.9 OTHER SPECIFIED HEALTH STATUS: ICD-10-CM

## 2024-05-16 DIAGNOSIS — Z01.84 ENCOUNTER FOR ANTIBODY RESPONSE EXAMINATION: ICD-10-CM

## 2024-05-16 PROCEDURE — 99214 OFFICE O/P EST MOD 30 MIN: CPT

## 2024-05-16 NOTE — HISTORY OF PRESENT ILLNESS
[FreeTextEntry1] : Patient presents for follow up evaluation for multiple medical concerns [de-identified] : HERE FOR HEALTH ASSESSMENT FOR WORK

## 2024-05-16 NOTE — PLAN
[FreeTextEntry1] : HM- Up to date Check fasting labs- drawn in office Forms for homecare to be filled pending results

## 2024-05-17 LAB
APPEARANCE: ABNORMAL
BILIRUBIN URINE: NEGATIVE
BLOOD URINE: NEGATIVE
COLOR: NORMAL
GLUCOSE QUALITATIVE U: NEGATIVE MG/DL
KETONES URINE: ABNORMAL MG/DL
LEUKOCYTE ESTERASE URINE: ABNORMAL
NITRITE URINE: NEGATIVE
PH URINE: 5.5
PROTEIN URINE: NORMAL MG/DL
SPECIFIC GRAVITY URINE: >1.03
UROBILINOGEN URINE: 1 MG/DL

## 2024-05-20 LAB — BACTERIA UR CULT: NORMAL

## 2024-06-21 ENCOUNTER — APPOINTMENT (OUTPATIENT)
Dept: OBGYN | Facility: CLINIC | Age: 24
End: 2024-06-21
Payer: MEDICAID

## 2024-06-21 ENCOUNTER — OUTPATIENT (OUTPATIENT)
Dept: OUTPATIENT SERVICES | Facility: HOSPITAL | Age: 24
LOS: 1 days | End: 2024-06-21
Payer: MEDICAID

## 2024-06-21 VITALS
RESPIRATION RATE: 18 BRPM | SYSTOLIC BLOOD PRESSURE: 114 MMHG | OXYGEN SATURATION: 100 % | TEMPERATURE: 97.9 F | HEART RATE: 81 BPM | WEIGHT: 113 LBS | DIASTOLIC BLOOD PRESSURE: 73 MMHG | HEIGHT: 65 IN | BODY MASS INDEX: 18.83 KG/M2

## 2024-06-21 DIAGNOSIS — M26.02 MAXILLARY HYPOPLASIA: Chronic | ICD-10-CM

## 2024-06-21 DIAGNOSIS — L70.0 ACNE VULGARIS: ICD-10-CM

## 2024-06-21 DIAGNOSIS — Z98.890 OTHER SPECIFIED POSTPROCEDURAL STATES: Chronic | ICD-10-CM

## 2024-06-21 DIAGNOSIS — Z00.00 ENCOUNTER FOR GENERAL ADULT MEDICAL EXAMINATION WITHOUT ABNORMAL FINDINGS: ICD-10-CM

## 2024-06-21 DIAGNOSIS — Z90.3 ACQUIRED ABSENCE OF STOMACH [PART OF]: Chronic | ICD-10-CM

## 2024-06-21 DIAGNOSIS — L68.0 HIRSUTISM: ICD-10-CM

## 2024-06-21 DIAGNOSIS — R30.0 DYSURIA: ICD-10-CM

## 2024-06-21 DIAGNOSIS — N89.8 OTHER SPECIFIED NONINFLAMMATORY DISORDERS OF VAGINA: ICD-10-CM

## 2024-06-21 PROCEDURE — 99213 OFFICE O/P EST LOW 20 MIN: CPT

## 2024-06-21 PROCEDURE — 87086 URINE CULTURE/COLONY COUNT: CPT

## 2024-06-21 PROCEDURE — G0463: CPT

## 2024-06-21 RX ORDER — CLOTRIMAZOLE 1 %
1 CREAM WITH APPLICATOR VAGINAL
Qty: 1 | Refills: 0 | Status: ACTIVE | COMMUNITY
Start: 2024-06-21 | End: 1900-01-01

## 2024-06-21 NOTE — PLAN
[FreeTextEntry1] : Vulvovaginal candidiasis==> eRx clotrimazole cream  Advised patient to use condoms the first 7 days of using contraceptive patch if she were to become sexually active  Follow up PRN

## 2024-06-21 NOTE — PHYSICAL EXAM
[Appropriately responsive] : appropriately responsive [Alert] : alert [No Acute Distress] : no acute distress [FreeTextEntry4] : white curdling vaginal discharge noted at the vagina opening, no speculum performed as patient is not sexually active

## 2024-06-24 DIAGNOSIS — L68.0 HIRSUTISM: ICD-10-CM

## 2024-06-24 DIAGNOSIS — L70.0 ACNE VULGARIS: ICD-10-CM

## 2024-06-24 DIAGNOSIS — N89.8 OTHER SPECIFIED NONINFLAMMATORY DISORDERS OF VAGINA: ICD-10-CM

## 2024-06-24 DIAGNOSIS — R30.0 DYSURIA: ICD-10-CM

## 2024-06-24 LAB — BACTERIA UR CULT: NORMAL

## 2024-07-07 ENCOUNTER — NON-APPOINTMENT (OUTPATIENT)
Age: 24
End: 2024-07-07

## 2024-07-17 ENCOUNTER — APPOINTMENT (OUTPATIENT)
Dept: DERMATOLOGY | Facility: CLINIC | Age: 24
End: 2024-07-17

## 2024-07-22 NOTE — ASU PREOP CHECKLIST - WEIGHT IN LBS
How Severe Are Your Spot(S)?: moderate What Type Of Note Output Would You Prefer (Optional)?: Standard Output What Is The Reason For Today's Visit?: Full Body Skin Examination What Is The Reason For Today's Visit? (Being Monitored For X): the development of new lesions 081

## 2024-08-08 NOTE — ED PROVIDER NOTE - CLINICAL SUMMARY MEDICAL DECISION MAKING FREE TEXT BOX
Writer spoke with patient.     [place] leave paperwork request for spouse (Dani) that was given to RN at office visit has been faxed to PCP Hernan as our office is unable to complete without a surgery date.     Patient has been referred to Dr. Brasher for cardiology.     Advised  patient to contact PCP office to schedule the required follow up visit from being in the ED and medication change request.   Patient states she is extremely overwhelmed with all the appointments and it is a lot to handle.   Writer advised to speak with PCP office for possible referral for  to help navigate and go from there, however the  first step would be to be seen in PCP office.     Patient verbalized understanding.     Writer did provide the office number [phone].     [facility] paperwork was faxed to PCP office [phone], writer sent staff message to nurse pool for PCP and  FMLA paperwork(blank) has also been sent to scanning.     Original paperwork placed in chart.   22-year-old female with history of gastric sleeve done in [facility]  1 year ago presenting to the ED with diffuse abdominal pain that began at 6 PM, no associated nausea, vomiting, fever, chills, urinary changes.  Some episodes of constipation and loose stools.   No sick contacts.  Vital stable.  Exam with no acute distress, clear lungs auscultation, mild diffuse abdominal palpation, no edema.  Given history of gastric sleeve concern for obstruction versus  post surgical issue.  Patient has never had follow-up regarding this.  Differential also goes gastritis,  enteritis, pancreatitis.   Will get CT abdomen pelvis with oral and IV contrast, labs, UA, pain control,  fluids with thiamine, folic acid,  multivitamin.  Will reassess.  Likely bariatric consult.

## 2024-08-16 ENCOUNTER — APPOINTMENT (OUTPATIENT)
Dept: OTOLARYNGOLOGY | Facility: CLINIC | Age: 24
End: 2024-08-16

## 2024-08-20 ENCOUNTER — APPOINTMENT (OUTPATIENT)
Dept: OTOLARYNGOLOGY | Facility: CLINIC | Age: 24
End: 2024-08-20
Payer: MEDICAID

## 2024-08-20 VITALS
SYSTOLIC BLOOD PRESSURE: 121 MMHG | HEART RATE: 71 BPM | BODY MASS INDEX: 18.83 KG/M2 | WEIGHT: 113 LBS | DIASTOLIC BLOOD PRESSURE: 79 MMHG | HEIGHT: 65 IN

## 2024-08-20 DIAGNOSIS — H60.313 DIFFUSE OTITIS EXTERNA, BILATERAL: ICD-10-CM

## 2024-08-20 DIAGNOSIS — H92.12 OTORRHEA, LEFT EAR: ICD-10-CM

## 2024-08-20 DIAGNOSIS — H61.23 IMPACTED CERUMEN, BILATERAL: ICD-10-CM

## 2024-08-20 DIAGNOSIS — H69.93 UNSPECIFIED EUSTACHIAN TUBE DISORDER, BILATERAL: ICD-10-CM

## 2024-08-20 DIAGNOSIS — H90.12 CONDUCTIVE HEARING LOSS, UNILATERAL, LEFT EAR, WITH UNRESTRICTED HEARING ON THE CONTRALATERAL SIDE: ICD-10-CM

## 2024-08-20 PROCEDURE — 69210 REMOVE IMPACTED EAR WAX UNI: CPT

## 2024-08-20 PROCEDURE — 99214 OFFICE O/P EST MOD 30 MIN: CPT | Mod: 25

## 2024-08-20 RX ORDER — CIPROFLOXACIN AND DEXAMETHASONE 3; 1 MG/ML; MG/ML
0.3-0.1 SUSPENSION/ DROPS AURICULAR (OTIC)
Qty: 1 | Refills: 1 | Status: ACTIVE | COMMUNITY
Start: 2024-08-20 | End: 1900-01-01

## 2024-08-20 RX ORDER — AMOXICILLIN AND CLAVULANATE POTASSIUM 875; 125 MG/1; MG/1
875-125 TABLET, COATED ORAL
Qty: 14 | Refills: 0 | Status: ACTIVE | COMMUNITY
Start: 2024-08-20 | End: 1900-01-01

## 2024-08-20 NOTE — ASSESSMENT
[FreeTextEntry1] : Exam today shows cerumen right ear, patent meatoplasty.  Left ear patent meatoplasty.  Severe myringitis and discharge of left tympanic membrane, unable to visualize perforation.  Patient feels sensation of air moving across tympanic membrane, suggesting possible small microperforation present in left ear.  Will empirically treat with Augmentin and Ciprodex drops, also applied topical powder to her ear today.  Follow-up 1 month.

## 2024-08-20 NOTE — HISTORY OF PRESENT ILLNESS
[de-identified] : 23 year old female with hx of Right ear external canal stenosis, right recurring otorrhea, right chronic otitis externa. presents for follow-up of clogged ears. PSHx: Right meatoplasty, right bony canaloplasty, tissue graft, use of operative  microscope on 04/12/23 Patient Left ear feels more clogged than the Right. Hearing stable from last visit. Patient denies otalgia, otorrhea, ear infections, tinnitus, dizziness, vertigo, headaches related to hearing.

## 2024-09-03 NOTE — H&P PST ADULT - BP NONINVASIVE DIASTOLIC (MM HG)
What Is The Reason For Today's Visit?: History of Melanoma Additional History: 6 month check Year Excised?: 2016 71

## 2024-09-16 ENCOUNTER — APPOINTMENT (OUTPATIENT)
Dept: INTERNAL MEDICINE | Facility: CLINIC | Age: 24
End: 2024-09-16

## 2024-10-04 ENCOUNTER — APPOINTMENT (OUTPATIENT)
Dept: ENDOCRINOLOGY | Facility: CLINIC | Age: 24
End: 2024-10-04

## 2024-10-11 ENCOUNTER — APPOINTMENT (OUTPATIENT)
Dept: INTERNAL MEDICINE | Facility: CLINIC | Age: 24
End: 2024-10-11

## 2024-10-11 VITALS
HEIGHT: 65 IN | OXYGEN SATURATION: 99 % | WEIGHT: 114 LBS | BODY MASS INDEX: 18.99 KG/M2 | HEART RATE: 97 BPM | DIASTOLIC BLOOD PRESSURE: 82 MMHG | SYSTOLIC BLOOD PRESSURE: 132 MMHG

## 2024-10-11 DIAGNOSIS — R53.1 WEAKNESS: ICD-10-CM

## 2024-10-11 LAB
24R-OH-CALCIDIOL SERPL-MCNC: 70.6 PG/ML
ALBUMIN SERPL ELPH-MCNC: 4.4 G/DL
ALP BLD-CCNC: 83 U/L
ALT SERPL-CCNC: 7 U/L
ANION GAP SERPL CALC-SCNC: 11 MMOL/L
APPEARANCE: CLEAR
AST SERPL-CCNC: 17 U/L
BACTERIA: ABNORMAL /HPF
BASOPHILS # BLD AUTO: 0.03 K/UL
BASOPHILS NFR BLD AUTO: 0.7 %
BILIRUB SERPL-MCNC: 0.5 MG/DL
BILIRUBIN URINE: NEGATIVE
BLOOD URINE: NEGATIVE
BUN SERPL-MCNC: 17 MG/DL
CALCIUM SERPL-MCNC: 9.7 MG/DL
CALCIUM SERPL-MCNC: 9.7 MG/DL
CAST: 1 /LPF
CHLORIDE SERPL-SCNC: 105 MMOL/L
CO2 SERPL-SCNC: 23 MMOL/L
COLOR: NORMAL
CREAT SERPL-MCNC: 0.69 MG/DL
EGFR: 124 ML/MIN/1.73M2
EOSINOPHIL # BLD AUTO: 0.04 K/UL
EOSINOPHIL NFR BLD AUTO: 1 %
EPITHELIAL CELLS: 14 /HPF
ESTIMATED AVERAGE GLUCOSE: 114 MG/DL
GLUCOSE QUALITATIVE U: NEGATIVE MG/DL
GLUCOSE SERPL-MCNC: 80 MG/DL
HBA1C MFR BLD HPLC: 5.6 %
HCT VFR BLD CALC: 37.1 %
HGB BLD-MCNC: 11.3 G/DL
IMM GRANULOCYTES NFR BLD AUTO: 0.2 %
KETONES URINE: NEGATIVE MG/DL
LEUKOCYTE ESTERASE URINE: ABNORMAL
LYMPHOCYTES # BLD AUTO: 1.47 K/UL
LYMPHOCYTES NFR BLD AUTO: 36.1 %
MAN DIFF?: NORMAL
MCHC RBC-ENTMCNC: 26.1 PG
MCHC RBC-ENTMCNC: 30.5 GM/DL
MCV RBC AUTO: 85.7 FL
MICROSCOPIC-UA: NORMAL
MONOCYTES # BLD AUTO: 0.31 K/UL
MONOCYTES NFR BLD AUTO: 7.6 %
NEUTROPHILS # BLD AUTO: 2.21 K/UL
NEUTROPHILS NFR BLD AUTO: 54.4 %
NITRITE URINE: NEGATIVE
PARATHYROID HORMONE INTACT: 63 PG/ML
PH URINE: 6
PLATELET # BLD AUTO: 254 K/UL
POTASSIUM SERPL-SCNC: 4.4 MMOL/L
PROT SERPL-MCNC: 7.1 G/DL
PROTEIN URINE: NORMAL MG/DL
RBC # BLD: 4.33 M/UL
RBC # FLD: 13.6 %
RED BLOOD CELLS URINE: 1 /HPF
SODIUM SERPL-SCNC: 139 MMOL/L
SPECIFIC GRAVITY URINE: >1.03
TSH SERPL-ACNC: 0.6 UIU/ML
UROBILINOGEN URINE: 1 MG/DL
VIT B12 SERPL-MCNC: 392 PG/ML
WBC # FLD AUTO: 4.07 K/UL
WHITE BLOOD CELLS URINE: 1 /HPF

## 2024-10-11 PROCEDURE — 99215 OFFICE O/P EST HI 40 MIN: CPT

## 2024-10-22 ENCOUNTER — TRANSCRIPTION ENCOUNTER (OUTPATIENT)
Age: 24
End: 2024-10-22

## 2024-11-22 ENCOUNTER — APPOINTMENT (OUTPATIENT)
Dept: OTOLARYNGOLOGY | Facility: CLINIC | Age: 24
End: 2024-11-22
Payer: MEDICAID

## 2024-11-22 VITALS
SYSTOLIC BLOOD PRESSURE: 98 MMHG | DIASTOLIC BLOOD PRESSURE: 62 MMHG | HEIGHT: 65 IN | WEIGHT: 113 LBS | BODY MASS INDEX: 18.83 KG/M2 | HEART RATE: 81 BPM

## 2024-11-22 DIAGNOSIS — H60.313 DIFFUSE OTITIS EXTERNA, BILATERAL: ICD-10-CM

## 2024-11-22 DIAGNOSIS — H61.23 IMPACTED CERUMEN, BILATERAL: ICD-10-CM

## 2024-11-22 DIAGNOSIS — H69.93 UNSPECIFIED EUSTACHIAN TUBE DISORDER, BILATERAL: ICD-10-CM

## 2024-11-22 PROCEDURE — 69210 REMOVE IMPACTED EAR WAX UNI: CPT

## 2024-11-22 PROCEDURE — 99213 OFFICE O/P EST LOW 20 MIN: CPT | Mod: 25

## 2025-01-19 ENCOUNTER — NON-APPOINTMENT (OUTPATIENT)
Age: 25
End: 2025-01-19

## 2025-04-01 ENCOUNTER — APPOINTMENT (OUTPATIENT)
Dept: OTOLARYNGOLOGY | Facility: CLINIC | Age: 25
End: 2025-04-01

## 2025-04-29 DIAGNOSIS — L65.9 NONSCARRING HAIR LOSS, UNSPECIFIED: ICD-10-CM

## 2025-04-29 DIAGNOSIS — E55.9 VITAMIN D DEFICIENCY, UNSPECIFIED: ICD-10-CM

## 2025-05-02 ENCOUNTER — APPOINTMENT (OUTPATIENT)
Dept: INTERNAL MEDICINE | Facility: CLINIC | Age: 25
End: 2025-05-02
Payer: MEDICAID

## 2025-05-02 ENCOUNTER — APPOINTMENT (OUTPATIENT)
Dept: INTERNAL MEDICINE | Facility: CLINIC | Age: 25
End: 2025-05-02

## 2025-05-02 VITALS
WEIGHT: 116 LBS | SYSTOLIC BLOOD PRESSURE: 110 MMHG | OXYGEN SATURATION: 100 % | HEART RATE: 73 BPM | HEIGHT: 65 IN | BODY MASS INDEX: 19.33 KG/M2 | DIASTOLIC BLOOD PRESSURE: 72 MMHG

## 2025-05-02 DIAGNOSIS — Z00.00 ENCOUNTER FOR GENERAL ADULT MEDICAL EXAMINATION W/OUT ABNORMAL FINDINGS: ICD-10-CM

## 2025-05-02 PROCEDURE — 99395 PREV VISIT EST AGE 18-39: CPT

## 2025-05-06 LAB
25(OH)D3 SERPL-MCNC: 22.6 NG/ML
ALBUMIN SERPL ELPH-MCNC: 4.6 G/DL
ALP BLD-CCNC: 79 U/L
ALT SERPL-CCNC: 13 U/L
ANION GAP SERPL CALC-SCNC: 12 MMOL/L
AST SERPL-CCNC: 19 U/L
BASOPHILS # BLD AUTO: 0.03 K/UL
BASOPHILS NFR BLD AUTO: 0.9 %
BILIRUB SERPL-MCNC: 0.5 MG/DL
BUN SERPL-MCNC: 18 MG/DL
CALCIUM SERPL-MCNC: 9.6 MG/DL
CHLORIDE SERPL-SCNC: 107 MMOL/L
CHOLEST SERPL-MCNC: 142 MG/DL
CO2 SERPL-SCNC: 21 MMOL/L
CREAT SERPL-MCNC: 0.64 MG/DL
EGFRCR SERPLBLD CKD-EPI 2021: 126 ML/MIN/1.73M2
EOSINOPHIL # BLD AUTO: 0.07 K/UL
EOSINOPHIL NFR BLD AUTO: 2.1 %
ESTIMATED AVERAGE GLUCOSE: 117 MG/DL
FERRITIN SERPL-MCNC: 6 NG/ML
GLUCOSE SERPL-MCNC: 86 MG/DL
HBA1C MFR BLD HPLC: 5.7 %
HCT VFR BLD CALC: 33.1 %
HDLC SERPL-MCNC: 57 MG/DL
HGB BLD-MCNC: 9.6 G/DL
IMM GRANULOCYTES NFR BLD AUTO: 0 %
IRON SATN MFR SERPL: 6 %
IRON SERPL-MCNC: 29 UG/DL
LDLC SERPL-MCNC: 74 MG/DL
LYMPHOCYTES # BLD AUTO: 1.39 K/UL
LYMPHOCYTES NFR BLD AUTO: 42.5 %
MAN DIFF?: NORMAL
MCHC RBC-ENTMCNC: 22 PG
MCHC RBC-ENTMCNC: 29 G/DL
MCV RBC AUTO: 75.9 FL
MONOCYTES # BLD AUTO: 0.26 K/UL
MONOCYTES NFR BLD AUTO: 8 %
NEUTROPHILS # BLD AUTO: 1.52 K/UL
NEUTROPHILS NFR BLD AUTO: 46.5 %
NONHDLC SERPL-MCNC: 85 MG/DL
PLATELET # BLD AUTO: 299 K/UL
POTASSIUM SERPL-SCNC: 4.5 MMOL/L
PROT SERPL-MCNC: 7.1 G/DL
RBC # BLD: 4.36 M/UL
RBC # FLD: 15.1 %
SODIUM SERPL-SCNC: 140 MMOL/L
TIBC SERPL-MCNC: 468 UG/DL
TRIGL SERPL-MCNC: 47 MG/DL
TSH SERPL-ACNC: 1.01 UIU/ML
UIBC SERPL-MCNC: 439 UG/DL
VIT B12 SERPL-MCNC: 374 PG/ML
WBC # FLD AUTO: 3.27 K/UL

## 2025-05-09 DIAGNOSIS — D50.9 IRON DEFICIENCY ANEMIA, UNSPECIFIED: ICD-10-CM

## 2025-05-19 ENCOUNTER — OUTPATIENT (OUTPATIENT)
Dept: OUTPATIENT SERVICES | Facility: HOSPITAL | Age: 25
LOS: 1 days | Discharge: HOME | End: 2025-05-19

## 2025-05-19 DIAGNOSIS — Z90.3 ACQUIRED ABSENCE OF STOMACH [PART OF]: Chronic | ICD-10-CM

## 2025-05-19 DIAGNOSIS — M26.02 MAXILLARY HYPOPLASIA: Chronic | ICD-10-CM

## 2025-05-19 DIAGNOSIS — D64.9 ANEMIA, UNSPECIFIED: ICD-10-CM

## 2025-05-20 ENCOUNTER — NON-APPOINTMENT (OUTPATIENT)
Age: 25
End: 2025-05-20

## 2025-05-20 ENCOUNTER — APPOINTMENT (OUTPATIENT)
Dept: HEMATOLOGY ONCOLOGY | Facility: CLINIC | Age: 25
End: 2025-05-20
Payer: MEDICAID

## 2025-05-20 DIAGNOSIS — D50.9 IRON DEFICIENCY ANEMIA, UNSPECIFIED: ICD-10-CM

## 2025-05-20 PROCEDURE — 99204 OFFICE O/P NEW MOD 45 MIN: CPT | Mod: 95

## 2025-05-28 ENCOUNTER — APPOINTMENT (OUTPATIENT)
Dept: OBGYN | Facility: CLINIC | Age: 25
End: 2025-05-28
Payer: MEDICAID

## 2025-05-28 VITALS
RESPIRATION RATE: 16 BRPM | WEIGHT: 116 LBS | OXYGEN SATURATION: 98 % | DIASTOLIC BLOOD PRESSURE: 76 MMHG | HEIGHT: 65 IN | BODY MASS INDEX: 19.33 KG/M2 | HEART RATE: 74 BPM | TEMPERATURE: 98 F | SYSTOLIC BLOOD PRESSURE: 116 MMHG

## 2025-05-28 DIAGNOSIS — N89.8 OTHER SPECIFIED NONINFLAMMATORY DISORDERS OF VAGINA: ICD-10-CM

## 2025-05-28 DIAGNOSIS — R30.0 DYSURIA: ICD-10-CM

## 2025-05-28 DIAGNOSIS — Z87.898 PERSONAL HISTORY OF OTHER SPECIFIED CONDITIONS: ICD-10-CM

## 2025-05-28 PROCEDURE — 99213 OFFICE O/P EST LOW 20 MIN: CPT

## 2025-05-28 RX ORDER — CLOTRIMAZOLE AND BETAMETHASONE DIPROPIONATE 10; .5 MG/G; MG/G
1-0.05 CREAM TOPICAL TWICE DAILY
Qty: 1 | Refills: 0 | Status: ACTIVE | COMMUNITY
Start: 2025-05-28 | End: 1900-01-01

## 2025-05-30 ENCOUNTER — APPOINTMENT (OUTPATIENT)
Dept: INFUSION THERAPY | Facility: HOSPITAL | Age: 25
End: 2025-05-30

## 2025-05-30 DIAGNOSIS — B37.31 ACUTE CANDIDIASIS OF VULVA AND VAGINA: ICD-10-CM

## 2025-05-30 LAB
BACTERIA UR CULT: NORMAL
BV BACTERIA RRNA VAG QL NAA+PROBE: NOT DETECTED
C GLABRATA RNA VAG QL NAA+PROBE: NOT DETECTED
C TRACH RRNA SPEC QL NAA+PROBE: NOT DETECTED
CANDIDA RRNA VAG QL PROBE: DETECTED
N GONORRHOEA RRNA SPEC QL NAA+PROBE: NOT DETECTED
T VAGINALIS RRNA SPEC QL NAA+PROBE: NOT DETECTED

## 2025-05-30 RX ORDER — TERCONAZOLE 80 MG/1
80 SUPPOSITORY VAGINAL
Qty: 3 | Refills: 1 | Status: ACTIVE | COMMUNITY
Start: 2025-05-30 | End: 1900-01-01

## 2025-06-02 DIAGNOSIS — D50.9 IRON DEFICIENCY ANEMIA, UNSPECIFIED: ICD-10-CM

## 2025-06-03 DIAGNOSIS — Z11.1 ENCOUNTER FOR SCREENING FOR RESPIRATORY TUBERCULOSIS: ICD-10-CM

## 2025-06-04 ENCOUNTER — APPOINTMENT (OUTPATIENT)
Dept: DERMATOLOGY | Facility: CLINIC | Age: 25
End: 2025-06-04
Payer: MEDICAID

## 2025-06-04 DIAGNOSIS — L03.019 CELLULITIS OF UNSPECIFIED FINGER: ICD-10-CM

## 2025-06-04 DIAGNOSIS — L30.9 DERMATITIS, UNSPECIFIED: ICD-10-CM

## 2025-06-04 DIAGNOSIS — L85.3 XEROSIS CUTIS: ICD-10-CM

## 2025-06-04 DIAGNOSIS — L72.3 SEBACEOUS CYST: ICD-10-CM

## 2025-06-04 PROCEDURE — 99213 OFFICE O/P EST LOW 20 MIN: CPT

## 2025-06-04 RX ORDER — DOXYCYCLINE HYCLATE 50 MG/1
50 CAPSULE ORAL
Qty: 28 | Refills: 0 | Status: ACTIVE | COMMUNITY
Start: 2025-06-04 | End: 1900-01-01

## 2025-06-04 RX ORDER — MUPIROCIN 20 MG/G
2 OINTMENT TOPICAL
Qty: 1 | Refills: 0 | Status: ACTIVE | COMMUNITY
Start: 2025-06-04 | End: 1900-01-01

## 2025-06-04 RX ORDER — TRIAMCINOLONE ACETONIDE 1 MG/G
0.1 OINTMENT TOPICAL
Qty: 2 | Refills: 1 | Status: ACTIVE | COMMUNITY
Start: 2025-06-04 | End: 1900-01-01

## 2025-06-06 ENCOUNTER — APPOINTMENT (OUTPATIENT)
Age: 25
End: 2025-06-06

## 2025-06-07 ENCOUNTER — APPOINTMENT (OUTPATIENT)
Dept: INFUSION THERAPY | Facility: HOSPITAL | Age: 25
End: 2025-06-07

## 2025-06-09 ENCOUNTER — APPOINTMENT (OUTPATIENT)
Dept: DERMATOLOGY | Facility: CLINIC | Age: 25
End: 2025-06-09

## 2025-06-09 LAB
M TB IFN-G BLD-IMP: NEGATIVE
QUANTIFERON TB PLUS MITOGEN MINUS NIL: 0.84 IU/ML
QUANTIFERON TB PLUS NIL: 0.03 IU/ML
QUANTIFERON TB PLUS TB1 MINUS NIL: 0.01 IU/ML
QUANTIFERON TB PLUS TB2 MINUS NIL: 0.01 IU/ML

## 2025-06-13 ENCOUNTER — APPOINTMENT (OUTPATIENT)
Dept: INFUSION THERAPY | Facility: HOSPITAL | Age: 25
End: 2025-06-13

## 2025-06-20 ENCOUNTER — APPOINTMENT (OUTPATIENT)
Dept: INFUSION THERAPY | Facility: HOSPITAL | Age: 25
End: 2025-06-20

## 2025-06-27 ENCOUNTER — APPOINTMENT (OUTPATIENT)
Dept: INFUSION THERAPY | Facility: HOSPITAL | Age: 25
End: 2025-06-27

## 2025-07-01 ENCOUNTER — OUTPATIENT (OUTPATIENT)
Dept: OUTPATIENT SERVICES | Facility: HOSPITAL | Age: 25
LOS: 1 days | Discharge: HOME | End: 2025-07-01

## 2025-07-01 DIAGNOSIS — M26.02 MAXILLARY HYPOPLASIA: Chronic | ICD-10-CM

## 2025-07-01 DIAGNOSIS — Z90.3 ACQUIRED ABSENCE OF STOMACH [PART OF]: Chronic | ICD-10-CM

## 2025-07-01 DIAGNOSIS — D64.9 ANEMIA, UNSPECIFIED: ICD-10-CM

## 2025-07-01 DIAGNOSIS — Z98.890 OTHER SPECIFIED POSTPROCEDURAL STATES: Chronic | ICD-10-CM

## 2025-07-02 ENCOUNTER — APPOINTMENT (OUTPATIENT)
Dept: INFUSION THERAPY | Facility: HOSPITAL | Age: 25
End: 2025-07-02

## 2025-07-09 ENCOUNTER — APPOINTMENT (OUTPATIENT)
Dept: INFUSION THERAPY | Facility: HOSPITAL | Age: 25
End: 2025-07-09

## 2025-07-09 DIAGNOSIS — D50.9 IRON DEFICIENCY ANEMIA, UNSPECIFIED: ICD-10-CM

## 2025-07-16 ENCOUNTER — LABORATORY RESULT (OUTPATIENT)
Age: 25
End: 2025-07-16

## 2025-07-17 ENCOUNTER — NON-APPOINTMENT (OUTPATIENT)
Age: 25
End: 2025-07-17

## 2025-08-26 ENCOUNTER — NON-APPOINTMENT (OUTPATIENT)
Age: 25
End: 2025-08-26

## 2025-08-27 ENCOUNTER — APPOINTMENT (OUTPATIENT)
Dept: HEMATOLOGY ONCOLOGY | Facility: CLINIC | Age: 25
End: 2025-08-27

## 2025-08-29 ENCOUNTER — NON-APPOINTMENT (OUTPATIENT)
Age: 25
End: 2025-08-29

## 2025-08-29 LAB
ALBUMIN SERPL ELPH-MCNC: 4.5 G/DL
ALP BLD-CCNC: 83 U/L
ALT SERPL-CCNC: 9 U/L
ANION GAP SERPL CALC-SCNC: 12 MMOL/L
AST SERPL-CCNC: 16 U/L
BASOPHILS # BLD AUTO: 0.04 K/UL
BASOPHILS NFR BLD AUTO: 0.9 %
BILIRUB SERPL-MCNC: 0.4 MG/DL
BUN SERPL-MCNC: 15 MG/DL
CALCIUM SERPL-MCNC: 9.6 MG/DL
CHLORIDE SERPL-SCNC: 103 MMOL/L
CO2 SERPL-SCNC: 22 MMOL/L
CREAT SERPL-MCNC: 0.67 MG/DL
EGFRCR SERPLBLD CKD-EPI 2021: 124 ML/MIN/1.73M2
EOSINOPHIL # BLD AUTO: 0.09 K/UL
EOSINOPHIL NFR BLD AUTO: 2.1 %
FERRITIN SERPL-MCNC: 66 NG/ML
FOLATE SERPL-MCNC: 13.2 NG/ML
GLUCOSE SERPL-MCNC: 157 MG/DL
HCT VFR BLD CALC: 39.8 %
HGB BLD-MCNC: 12.2 G/DL
IMM GRANULOCYTES NFR BLD AUTO: 0.2 %
IRON SATN MFR SERPL: 22 %
IRON SERPL-MCNC: 77 UG/DL
LYMPHOCYTES # BLD AUTO: 1.34 K/UL
LYMPHOCYTES NFR BLD AUTO: 30.6 %
MAN DIFF?: NORMAL
MCHC RBC-ENTMCNC: 26.5 PG
MCHC RBC-ENTMCNC: 30.7 G/DL
MCV RBC AUTO: 86.3 FL
MONOCYTES # BLD AUTO: 0.3 K/UL
MONOCYTES NFR BLD AUTO: 6.8 %
NEUTROPHILS # BLD AUTO: 2.6 K/UL
NEUTROPHILS NFR BLD AUTO: 59.4 %
PLATELET # BLD AUTO: 219 K/UL
POTASSIUM SERPL-SCNC: 4.6 MMOL/L
PROT SERPL-MCNC: 6.7 G/DL
RBC # BLD: 4.61 M/UL
RBC # FLD: 18.4 %
SODIUM SERPL-SCNC: 138 MMOL/L
TIBC SERPL-MCNC: 351 UG/DL
UIBC SERPL-MCNC: 274 UG/DL
VIT B12 SERPL-MCNC: 366 PG/ML
WBC # FLD AUTO: 4.38 K/UL

## 2025-09-04 DIAGNOSIS — D50.9 IRON DEFICIENCY ANEMIA, UNSPECIFIED: ICD-10-CM

## 2025-09-04 RX ORDER — CHLORHEXIDINE GLUCONATE 4 %
1000 LIQUID (ML) TOPICAL
Qty: 60 | Refills: 0 | Status: ACTIVE | COMMUNITY
Start: 2025-09-04 | End: 1900-01-01

## (undated) DEVICE — DRSG STERISTRIPS 0.5 X 4"

## (undated) DEVICE — SOL IRR POUR H2O 500ML

## (undated) DEVICE — CONN FEMALE LUER ADAPTOR SM XMAS TREE

## (undated) DEVICE — MARKING PEN W RULER

## (undated) DEVICE — WARMING BLANKET FULL ADULT

## (undated) DEVICE — ELCTR MONOPOLAR STIMULATOR PROBE FLUSH-TIP

## (undated) DEVICE — BEAVER BLADE MINI SHARP ALL ROUND (BLUE)

## (undated) DEVICE — PACK MASTOID/MIDDLE EAR

## (undated) DEVICE — POSITIONER PATIENT SAFETY STRAP 3X60"

## (undated) DEVICE — DRILL BIT ANSPACH DIAMOND BALL 4MM X 25.4MM

## (undated) DEVICE — SUT VICRYL 3-0 27" SH UNDYED

## (undated) DEVICE — DRSG TEGADERM 6"X8"

## (undated) DEVICE — SUT PLAIN GUT FAST ABSORBING 5-0 PC-1

## (undated) DEVICE — VENODYNE/SCD SLEEVE CALF MEDIUM

## (undated) DEVICE — ELCTR GROUNDING PAD ADULT COVIDIEN

## (undated) DEVICE — DRAIN PENROSE .5" X 18" LATEX

## (undated) DEVICE — SUT MONOCRYL 4-0 18" P-3 UNDYED

## (undated) DEVICE — DRSG GLASSOCK ADULT

## (undated) DEVICE — SUT VICRYL 3-0 18" RB-1 (POP-OFF)

## (undated) DEVICE — DRSG MASTISOL

## (undated) DEVICE — BIPOLAR FORCEP KIRWAN JEWELERS STR 4" X 0.4MM W 12FT CORD (GREEN)

## (undated) DEVICE — PACK NASAL

## (undated) DEVICE — TRAY IRRIGATION SYR BULB 60CC

## (undated) DEVICE — DRSG PELLET

## (undated) DEVICE — DRAPE TOWEL BLUE 17" X 24"

## (undated) DEVICE — DRILL BIT ANSPACH DIAMOND BALL 2MMX5CM

## (undated) DEVICE — DRAPE INSTRUMENT POUCH 6.75" X 11"

## (undated) DEVICE — STAPLER SKIN PROXIMATE

## (undated) DEVICE — PREP BETADINE KIT

## (undated) DEVICE — POSITIONER FOAM EGG CRATE ULNAR 2PCS (PINK)

## (undated) DEVICE — ELCTR ROCKER SWITCH PENCIL BLUE 10FT

## (undated) DEVICE — DRSG TELFA 3 X 8

## (undated) DEVICE — DRAPE 3/4 SHEET 52X76"

## (undated) DEVICE — DRAPE MAYO STAND 30"

## (undated) DEVICE — DRAPE MICROSCOPE OPMI VISIONGUARD 48X118"

## (undated) DEVICE — SUT CHROMIC 3-0 27" RB-1

## (undated) DEVICE — GLV 7.5 PROTEXIS (WHITE)

## (undated) DEVICE — BUR ANSPACH BALL DIAMOND COARSE 3MM

## (undated) DEVICE — SYR LUER LOK 5CC

## (undated) DEVICE — DRAPE SPLIT SHEET 77" X 120"

## (undated) DEVICE — GLV 7 PROTEXIS (WHITE)

## (undated) DEVICE — RADIOGRAPHY DVC SPEC TRANSPEC

## (undated) DEVICE — SUT VICRYL 4-0 18" RB-1 UNDYED (POP-OFF)

## (undated) DEVICE — ELCTR NDL SUBDERMAL 2 CHANNEL

## (undated) DEVICE — CATH IV SAFE INSYTE 14G X 1.75" (ORANGE)

## (undated) DEVICE — DRILL BIT ANSPACH FLUTED ACORN 5MMX25.4MM

## (undated) DEVICE — TUBING IRRIGATION HF NAVIO

## (undated) DEVICE — DRILL BIT ANSPACH FLUTED BALL 4MM X 5CM

## (undated) DEVICE — ELCTR BOVIE TIP BLADE INSULATED 4" EDGE

## (undated) DEVICE — POSITIONER STRAP ARMBOARD VELCRO TS-30

## (undated) DEVICE — DRAPE MICROSCOPE ZEISS W CLEARLENS

## (undated) DEVICE — DRSG KIT EAR GLASSCK PEDS

## (undated) DEVICE — POSITIONER FOAM HEAD DONUT 9" (PINK)

## (undated) DEVICE — SUT MONOCRYL 4-0 27" PS-2 UNDYED

## (undated) DEVICE — BUR ANSPACH BALL FLUTED EXT 3MM

## (undated) DEVICE — FOLEY CATH 2-WAY 22FR 5CC UNCOATED SILICONE

## (undated) DEVICE — LABELS BLANK W PEN

## (undated) DEVICE — ELCTR BOVIE TIP BLADE INSULATED 2.75" EDGE

## (undated) DEVICE — CANISTER DISPOSABLE THIN WALL 3000CC

## (undated) DEVICE — GLV 7.5 PROTEXIS (BLUE)

## (undated) DEVICE — DRILL BIT ANSPACH DIAMOND BALL 1.5MMX5CM

## (undated) DEVICE — COTTONBALL LG

## (undated) DEVICE — GOWN LG

## (undated) DEVICE — BAG DECANTER IV STERILE

## (undated) DEVICE — WARMING BLANKET LOWER ADULT

## (undated) DEVICE — CLIPPER BLADE GENERAL USE

## (undated) DEVICE — BLADE TYMPANOPLASTY 2.5MM W 60 DEGREE BEVEL DOWN

## (undated) DEVICE — SOL IRR POUR NS 0.9% 500ML

## (undated) DEVICE — STRYKER 4-PORT MANIFOLD W/SPECIMEN COLLECTION

## (undated) DEVICE — SYR LUER LOK 1CC

## (undated) DEVICE — DRAPE CHEST BREAST 106" X 122"

## (undated) DEVICE — SYR LUER LOK 20CC

## (undated) DEVICE — CLIP IRRIGATIION FOR QD8/QD5S ANGLE ATTACHMENT

## (undated) DEVICE — SUT SILK 2-0 30" SH

## (undated) DEVICE — DRAPE SURGICAL #1010

## (undated) DEVICE — BLADE SICKLE EDGE STRAIGHT 84MM